# Patient Record
Sex: FEMALE | Race: WHITE | Employment: OTHER | URBAN - METROPOLITAN AREA
[De-identification: names, ages, dates, MRNs, and addresses within clinical notes are randomized per-mention and may not be internally consistent; named-entity substitution may affect disease eponyms.]

---

## 2017-02-28 ENCOUNTER — TELEPHONE (OUTPATIENT)
Dept: INTERNAL MEDICINE CLINIC | Age: 62
End: 2017-02-28

## 2017-02-28 DIAGNOSIS — G47.9 SLEEP DISORDER: ICD-10-CM

## 2017-02-28 DIAGNOSIS — F41.9 ANXIETY: ICD-10-CM

## 2017-02-28 RX ORDER — PRAMIPEXOLE DIHYDROCHLORIDE 0.5 MG/1
TABLET ORAL
Qty: 180 TAB | Refills: 0 | Status: SHIPPED | OUTPATIENT
Start: 2017-02-28 | End: 2017-08-19 | Stop reason: SDUPTHER

## 2017-03-02 NOTE — TELEPHONE ENCOUNTER
Last office visit - 05.13.16  Next office visit - n/a      Requested Prescriptions     Pending Prescriptions Disp Refills    clonazePAM (KLONOPIN) 0.5 mg tablet 30 Tab 3     Sig: Take 1 Tab by mouth nightly as needed.  Max Daily Amount: 0.5 mg.     Signed Prescriptions Disp Refills    pramipexole (MIRAPEX) 0.5 mg tablet 180 Tab 0     Sig: TAKE ONE TABLET BY MOUTH 2 TIMES A DAY     Authorizing Provider: BEBETO QUICK

## 2017-03-03 RX ORDER — CLONAZEPAM 0.5 MG/1
0.5 TABLET ORAL
Qty: 30 TAB | Refills: 0 | OUTPATIENT
Start: 2017-03-03 | End: 2017-04-07 | Stop reason: SDUPTHER

## 2017-03-03 NOTE — TELEPHONE ENCOUNTER
Orders Placed This Encounter    pramipexole (MIRAPEX) 0.5 mg tablet     Sig: TAKE ONE TABLET BY MOUTH 2 TIMES A DAY     Dispense:  180 Tab     Refill:  0    clonazePAM (KLONOPIN) 0.5 mg tablet     Sig: Take 1 Tab by mouth nightly as needed. Max Daily Amount: 0.5 mg.     Dispense:  30 Tab     Refill:  0      Due for follow up visit. Need follow up for further refills.   Will discuss taper at visit

## 2017-03-03 NOTE — TELEPHONE ENCOUNTER
Spoke to pt - she states that she has no problem scheduling a follow up but is out of town and will not be in front of her May schedule until Monday. She will contact the office at that time to schedule yearly f/u. She also notes that she does not want to wait until May to be advised of how to taper her Citalopram. Per pt \"I do not feel like I need it at this time. I think this is just a medication I need through the Winter (November- February) and I'd like to come off of it now if I do not need it. \"   Pt was advise that it would be best to stay on the medication until there is further information provided by Dr. Kathie Piña.      Rx called into pharmacy on file.

## 2017-03-03 NOTE — TELEPHONE ENCOUNTER
Patient called to check the status of her clonazepam script. She is completely out of medication.  Also she would like instructions on how to taper off of her citalopram.

## 2017-03-06 NOTE — TELEPHONE ENCOUNTER
Patient has scheduled her cpe for 06/16/2017.  She would like information about tapering off of the citalopram

## 2017-04-07 DIAGNOSIS — G47.9 SLEEP DISORDER: ICD-10-CM

## 2017-04-07 DIAGNOSIS — F41.9 ANXIETY: ICD-10-CM

## 2017-04-07 RX ORDER — CLONAZEPAM 0.5 MG/1
0.5 TABLET ORAL
Qty: 30 TAB | Refills: 0 | OUTPATIENT
Start: 2017-04-07 | End: 2017-05-03 | Stop reason: SDUPTHER

## 2017-04-07 NOTE — TELEPHONE ENCOUNTER
Rx called into pharmacy on file. Requested Prescriptions     Signed Prescriptions Disp Refills    clonazePAM (KLONOPIN) 0.5 mg tablet 30 Tab 0     Sig: Take 1 Tab by mouth nightly as needed. Max Daily Amount: 0.5 mg.      Authorizing Provider: Kami Hanna

## 2017-04-07 NOTE — TELEPHONE ENCOUNTER
Orders Placed This Encounter    clonazePAM (KLONOPIN) 0.5 mg tablet     Sig: Take 1 Tab by mouth nightly as needed.  Max Daily Amount: 0.5 mg.     Dispense:  30 Tab     Refill:  0       reviewed 4/7/2017

## 2017-04-07 NOTE — TELEPHONE ENCOUNTER
Requested Prescriptions     Pending Prescriptions Disp Refills    clonazePAM (KLONOPIN) 0.5 mg tablet 30 Tab 0     Sig: Take 1 Tab by mouth nightly as needed. Max Daily Amount: 0.5 mg.      LOV 5/13/16  NOV 6/16/17  Last refill 3/3/17

## 2017-05-03 DIAGNOSIS — F41.9 ANXIETY: ICD-10-CM

## 2017-05-03 DIAGNOSIS — G47.9 SLEEP DISORDER: ICD-10-CM

## 2017-05-03 RX ORDER — CLONAZEPAM 0.5 MG/1
0.5 TABLET ORAL
Qty: 30 TAB | Refills: 0 | OUTPATIENT
Start: 2017-05-03 | End: 2017-06-05 | Stop reason: SDUPTHER

## 2017-05-03 NOTE — TELEPHONE ENCOUNTER
Requested Prescriptions     Pending Prescriptions Disp Refills    clonazePAM (KLONOPIN) 0.5 mg tablet 30 Tab 0     Sig: Take 1 Tab by mouth nightly as needed. Max Daily Amount: 0.5 mg.      Last office 5/13/16  Next appointment 6/16/17  Last refill 4/7/17

## 2017-05-03 NOTE — TELEPHONE ENCOUNTER
Rx called into pharmacy on file. Requested Prescriptions     Signed Prescriptions Disp Refills    clonazePAM (KLONOPIN) 0.5 mg tablet 30 Tab 0     Sig: Take 1 Tab by mouth nightly as needed. Max Daily Amount: 0.5 mg.      Authorizing Provider: Karin Jimenez

## 2017-05-03 NOTE — TELEPHONE ENCOUNTER
Orders Placed This Encounter    clonazePAM (KLONOPIN) 0.5 mg tablet     Sig: Take 1 Tab by mouth nightly as needed.  Max Daily Amount: 0.5 mg.     Dispense:  30 Tab     Refill:  0       reviewed 5/3/2017

## 2017-05-15 ENCOUNTER — TELEPHONE (OUTPATIENT)
Dept: INTERNAL MEDICINE CLINIC | Age: 62
End: 2017-05-15

## 2017-05-15 NOTE — TELEPHONE ENCOUNTER
Please advise regarding the requested letter. Will advise pt to contact travel clinic for further information on travel immunizations.

## 2017-05-15 NOTE — LETTER
5/16/2017 2:41 PM 
 
Ms. Alfred Rodríguez [address] [facility] [id number] To Whom It May Concern, 
 
Ms. Rodríguez has a medical condition that limits her ability to sit for a prolonged period of time. It  will be necessary for her to get up and walk periodically during her flight to relieve her symptoms, to the extent that air safety allows. Please let me know if you have any questions. Sincerely, Yara Church MD

## 2017-05-15 NOTE — TELEPHONE ENCOUNTER
Patient would like  to write her a letter stating that she has restless leg syndrome and will need to occasionally move around during her flight.  Also she wants to know what vaccinations she will need to go to [place].Please call her back at [phone]

## 2017-05-16 NOTE — TELEPHONE ENCOUNTER
Letter for flight written, patient notified that it is ready to . Patient referred to [facility] on [address] for travel immunization information.

## 2017-06-05 DIAGNOSIS — G47.9 SLEEP DISORDER: ICD-10-CM

## 2017-06-05 DIAGNOSIS — F41.9 ANXIETY: ICD-10-CM

## 2017-06-05 NOTE — TELEPHONE ENCOUNTER
Received fax from [facility]  for Rx refill of Clonazepam 0.5mg tablets.   Rx pended to Dr. Napoleon Resendiz for approval.

## 2017-06-06 RX ORDER — CLONAZEPAM 0.5 MG/1
0.5 TABLET ORAL
Qty: 30 TAB | Refills: 0 | OUTPATIENT
Start: 2017-06-06 | End: 2017-06-23 | Stop reason: SDUPTHER

## 2017-06-06 NOTE — TELEPHONE ENCOUNTER
Orders Placed This Encounter    clonazePAM (KLONOPIN) 0.5 mg tablet     Sig: Take 1 Tab by mouth nightly as needed.  Max Daily Amount: 0.5 mg.     Dispense:  30 Tab     Refill:  0       reviewed 6/6/2017

## 2017-06-16 ENCOUNTER — OFFICE VISIT (OUTPATIENT)
Dept: INTERNAL MEDICINE CLINIC | Age: 62
End: 2017-06-16

## 2017-06-16 VITALS
WEIGHT: 105 LBS | TEMPERATURE: 98.3 F | HEART RATE: 67 BPM | OXYGEN SATURATION: 97 % | BODY MASS INDEX: 17.93 KG/M2 | DIASTOLIC BLOOD PRESSURE: 52 MMHG | RESPIRATION RATE: 14 BRPM | SYSTOLIC BLOOD PRESSURE: 96 MMHG | HEIGHT: 64 IN

## 2017-06-16 DIAGNOSIS — Z79.899 ENCOUNTER FOR LONG-TERM (CURRENT) USE OF MEDICATIONS: ICD-10-CM

## 2017-06-16 DIAGNOSIS — Z00.00 ANNUAL PHYSICAL EXAM: Primary | ICD-10-CM

## 2017-06-16 DIAGNOSIS — G25.81 RESTLESS LEG: ICD-10-CM

## 2017-06-16 DIAGNOSIS — F33.8 SEASONAL AFFECTIVE DISORDER (HCC): ICD-10-CM

## 2017-06-16 DIAGNOSIS — G47.9 SLEEP DISTURBANCE: ICD-10-CM

## 2017-06-16 RX ORDER — ESTRADIOL 0.03 MG/D
FILM, EXTENDED RELEASE TRANSDERMAL
COMMUNITY
Start: 2017-05-12

## 2017-06-16 RX ORDER — PROGESTERONE 100 MG/1
100 CAPSULE ORAL DAILY
COMMUNITY
Start: 2017-05-11

## 2017-06-16 NOTE — PROGRESS NOTES
1. Have you been to the ER, urgent care clinic since your last visit? Hospitalized since your last visit? No    2. Have you seen or consulted any other health care providers outside of the [address] [place] [address] since your last visit? Include any pap smears or colon screening. No     Chief Complaint   Patient presents with    Complete Physical    Medication Evaluation     would like to discuss Requip and medication to stay asleep longer.  Skin Problem     spot on left side of nose would like recommendation to see derm. Not fasting.

## 2017-06-16 NOTE — PROGRESS NOTES
Subjective:      Armaan Villalba is a 60 y.o. female who presents today for her annual exam.     She is having difficulty staying asleep. No trouble falling asleep. Will wake around 3 am and not be able to fall back asleep. She gets up aroung 5-6 AM for work. Saw a medical . Little spot on side of nose. - need to see derm    History of Right knee cartilage surgery in her 20's. She is now having some pain in her right knee. Intermittent. Ache. Has not had to use any medications. Patient Active Problem List   Diagnosis Code    Restless legs syndrome G25.81    Sleep disorder G47.9    Osteopenia M85.80    Celiac disease K90.0    Iron deficiency E61.1    Pap smear for cervical cancer screening Z12.4    History of screening mammography Z92.89    History of bone density study Z92.89    Colon cancer screening Z12.11    Herpetic lesion B00.9    Lumbar disc disease with radiculopathy M51.16    Depression with anxiety F41.8    Advance directive discussed with patient Z71.89     Current Outpatient Prescriptions   Medication Sig Dispense Refill    estradiol 0.025 mg/24 hr ptsw       progesterone (PROMETRIUM) 100 mg capsule       clonazePAM (KLONOPIN) 0.5 mg tablet Take 1 Tab by mouth nightly as needed. Max Daily Amount: 0.5 mg. 30 Tab 0    pramipexole (MIRAPEX) 0.5 mg tablet TAKE ONE TABLET BY MOUTH 2 TIMES A  Tab 0    VAGIFEM 10 mcg tab vaginal tablet       valACYclovir (VALTREX) 500 mg tablet Take 1 Tab by mouth two (2) times a day. For 1-3 days as needed. 30 Tab 3        Review of Systems    Pertinent items are noted in HPI. Objective:     Visit Vitals    BP 96/52 (BP 1 Location: Left arm, BP Patient Position: Sitting)    Pulse 67    Temp 98.3 °F (36.8 °C) (Oral)    Resp 14    Ht 5' 3.5\" (1.613 m)    Wt 105 lb (47.6 kg)    SpO2 97%    BMI 18.31 kg/m2     General:  Alert, cooperative, no distress, appears stated age.    Head:  Normocephalic, without obvious abnormality, atraumatic. Eyes:  Conjunctivae/corneas clear. PERRL, EOMs intact. Ears:  Normal TMs and external ear canals both ears. Nose: Nares normal. Septum midline. Mucosa normal. No drainage or sinus tenderness. Throat: Lips, mucosa, and tongue normal. Teeth and gums normal.   Neck: Supple, symmetrical, trachea midline, no adenopathy, thyroid: no enlargement/tenderness/nodules, no carotid bruit and no JVD. Back:   Symmetric, no curvature. ROM normal. No CVA tenderness. Lungs:   Clear to auscultation bilaterally. Chest wall:  No tenderness or deformity. Heart:  Regular rate and rhythm, S1, S2 normal, no murmur, click, rub or gallop. Breast Exam:  No tenderness, masses, or nipple abnormality. Abdomen:   Soft, non-tender. Bowel sounds normal. No masses,  No organomegaly. Extremities: Extremities normal, atraumatic, no cyanosis or edema. Incisional scar on medial right knee   Pulses: 2+ and symmetric all extremities. Skin: Skin color, texture, turgor normal. No rashes or lesions. Lymph nodes: Cervical, supraclavicular, and axillary nodes normal.   Neurologic: CNII-XII intact. Normal strength, sensation and reflexes throughout. Assessment/Plan:     1. Annual physical exam  -up to date with gyn exam  -up to date with screening mammogram and colonoscopy   -up to date with DXA. - CBC WITH AUTOMATED DIFF  - METABOLIC PANEL, COMPREHENSIVE  - LIPID PANEL  - VITAMIN D, 25 HYDROXY    Also, she has additional complaints of the following ---.     2. Restless leg  -I evaluated and recommended to continue current doses of medications. - CBC WITH AUTOMATED DIFF  - METABOLIC PANEL, COMPREHENSIVE  - TSH 3RD GENERATION  - IRON PROFILE    3. Sleep disturbance  -will increase her clonazepam from 0.5mg nightly to 1 1/2 of this tablet at bedtime.   If this adjustment is not helpful, will go back to one tablet nightly and switch her mirapex to requip which she has used in the past and may have helped with sleep as well    - TSH 3RD GENERATION    4. Encounter for long-term (current) use of medications      5. Seasonal affective disorder ([facility] [address]  -found use of celexa helpful in the winter.  -will follow up in September to determine if restarting celexa at that time would be beneficial.    Orders Placed This Encounter    CBC WITH AUTOMATED DIFF    METABOLIC PANEL, COMPREHENSIVE    LIPID PANEL    VITAMIN D, 25 HYDROXY    TSH 3RD GENERATION    IRON PROFILE     Follow-up Disposition:     Follow up in 3 months    Return if symptoms worsen or fail to improve. Advised patient to call back or return to office if symptoms worsen/change/persist.     Discussed expected course/resolution/complications of diagnosis in detail with patient. Medication risks/benefits/costs/interactions/alternatives discussed with patient. Patient was given an after visit summary which includes diagnoses, current medications, & vitals. Patient expressed understanding with the diagnosis and plan.

## 2017-06-16 NOTE — MR AVS SNAPSHOT
Visit Information Date & Time Provider Department Dept. Phone Encounter #  
 6/16/2017 10:00 AM Killian Cuevas MD [place] [address] [facility] [phone] [id number] Follow-up Instructions Return in about 3 days (around 6/19/2017). Upcoming Health Maintenance Date Due INFLUENZA AGE 9 TO ADULT 8/1/2017 PAP AKA CERVICAL CYTOLOGY 7/1/2018 BREAST CANCER SCRN MAMMOGRAM 7/7/2018 DTaP/Tdap/Td series (2 - Td) 12/17/2018 COLONOSCOPY 2/8/2023 Allergies as of 6/16/2017  Review Complete On: 6/16/2017 By: Killian Cuevas MD  
  
 Severity Noted Reaction Type Reactions Ambien Cr [Zolpidem] Medium 10/08/2012   Side Effect Other (comments) Agitation & mood lability; higher dosages only Requip [Ropinirole]  11/29/2012   Side Effect Nausea Only Current Immunizations  Reviewed on 6/15/2017 Name Date Hep B Vaccine 4/28/1998, 3/24/1998, 3/24/1998 Tdap 12/17/2008 Reviewed by Killian Cuevas MD on 6/15/2017 at  3:27 PM  
You Were Diagnosed With   
  
 Codes Comments Annual physical exam    -  Primary ICD-10-CM: Z00.00 ICD-9-CM: V70.0 Restless leg     ICD-10-CM: G25.81 ICD-9-CM: 333.94 Sleep disturbance     ICD-10-CM: G47.9 ICD-9-CM: 780.50 Encounter for long-term (current) use of medications     ICD-10-CM: Z79.899 ICD-9-CM: V58.69 [facility]     ICD-10-CM: F39 
ICD-9-CM: 296.99 Vitals BP Pulse Temp Resp Height(growth percentile) Weight(growth percentile) 96/52 (BP 1 Location: Left arm, BP Patient Position: Sitting) 67 98.3 °F (36.8 °C) (Oral) 14 5' 3.5\" (1.613 m) 105 lb (47.6 kg) SpO2 BMI OB Status Smoking Status 97% 18.31 kg/m2 Postmenopausal Never Smoker Vitals History BMI and BSA Data Body Mass Index Body Surface Area  
 18.31 kg/m 2 1.46 m 2 Preferred Pharmacy Pharmacy Name Phone [facility] [phone] Your Updated Medication List  
  
   
This list is accurate as of: 6/16/17 11:24 AM.  Always use your most recent med list.  
  
  
  
  
 clonazePAM 0.5 mg tablet Commonly known as:  Wendelyn Mech Take 1 Tab by mouth nightly as needed. Max Daily Amount: 0.5 mg.  
  
 pramipexole 0.5 mg tablet Commonly known as:  MIRAPEX TAKE ONE TABLET BY MOUTH 2 TIMES A DAY  
  
 progesterone 100 mg capsule Commonly known as:  PROMETRIUM * VAGIFEM 10 mcg Tab vaginal tablet Generic drug:  estradiol * estradiol 0.025 mg/24 hr Ptsw  
  
 valACYclovir 500 mg tablet Commonly known as:  VALTREX Take 1 Tab by mouth two (2) times a day. For 1-3 days as needed. * Notice: This list has 2 medication(s) that are the same as other medications prescribed for you. Read the directions carefully, and ask your doctor or other care provider to review them with you. We Performed the Following CBC WITH AUTOMATED DIFF [14714 CPT(R)] IRON PROFILE C3091961 CPT(R)] LIPID PANEL [34570 CPT(R)] METABOLIC PANEL, COMPREHENSIVE [88183 CPT(R)] TSH 3RD GENERATION [03450 CPT(R)] VITAMIN D, 25 HYDROXY [id number] CPT(R)] Follow-up Instructions Return in about 3 days (around 6/19/2017). Introducing [facility] & HEALTH SERVICES! [facility] introduces [employer] patient portal. Now you can access parts of your medical record, email your doctor's office, and request medication refills online. 1. In your internet browser, go to [email or web address] 2. Click on the First Time User? Click Here link in the Sign In box. You will see the New Member Sign Up page. 3. Enter your [employer] Access Code exactly as it appears below. You will not need to use this code after youve completed the sign-up process. If you do not sign up before the expiration date, you must request a new code. · [employer] Access Code: [email or web address] Expires: 9/14/2017 11:22 AM 
 
 4. Enter the last four digits of your Social Security Number (xxxx) and Date of Birth (mm/dd/yyyy) as indicated and click Submit. You will be taken to the next sign-up page. 5. Create a SocialKaty ID. This will be your [employer] login ID and cannot be changed, so think of one that is secure and easy to remember. 6. Create a [employer] password. You can change your password at any time. 7. Enter your Password Reset Question and Answer. This can be used at a later time if you forget your password. 8. Enter your e-mail address. You will receive e-mail notification when new information is available in [address] 9. Click Sign Up. You can now view and download portions of your medical record. 10. Click the Download Summary menu link to download a portable copy of your medical information. If you have questions, please visit the Frequently Asked Questions section of the [employer] website. Remember, [employer] is NOT to be used for urgent needs. For medical emergencies, dial 911. Now available from your iPhone and Android! Please provide this summary of care documentation to your next provider. Your primary care clinician is listed as Ara Turner. If you have any questions after today's visit, please call [phone].

## 2017-06-23 ENCOUNTER — TELEPHONE (OUTPATIENT)
Dept: INTERNAL MEDICINE CLINIC | Age: 62
End: 2017-06-23

## 2017-06-23 DIAGNOSIS — F41.9 ANXIETY: ICD-10-CM

## 2017-06-23 DIAGNOSIS — G47.9 SLEEP DISORDER: ICD-10-CM

## 2017-06-23 RX ORDER — CLONAZEPAM 0.5 MG/1
0.75 TABLET ORAL
Qty: 45 TAB | Refills: 3 | OUTPATIENT
Start: 2017-06-23 | End: 2017-11-15 | Stop reason: SDUPTHER

## 2017-06-23 NOTE — TELEPHONE ENCOUNTER
Last office visit - 06.16.17  Next office visit - 09.29.17  Last Refill - 06.06.17    Rx quantity changed to satisfy the added 1/2 tablet & pended for review. Requested Prescriptions     Pending Prescriptions Disp Refills    clonazePAM (KLONOPIN) 0.5 mg tablet 45 Tab 0     Sig: Take 1 Tab by mouth nightly as needed.  Max Daily Amount: 0.5 mg.

## 2017-06-23 NOTE — TELEPHONE ENCOUNTER
Rx called into pharmacy on file. Requested Prescriptions     Signed Prescriptions Disp Refills    clonazePAM (KLONOPIN) 0.5 mg tablet 45 Tab 3     Sig: Take 1.5 Tabs by mouth nightly as needed. Max Daily Amount: 0.75 mg.      Authorizing Provider: River Pineda

## 2017-06-23 NOTE — TELEPHONE ENCOUNTER
Orders Placed This Encounter    clonazePAM (KLONOPIN) 0.5 mg tablet     Sig: Take 1.5 Tabs by mouth nightly as needed. Max Daily Amount: 0.75 mg.      Dispense:  45 Tab     Refill:  3       reviewed 6/23/2017

## 2017-06-23 NOTE — TELEPHONE ENCOUNTER
David Rodríguez 1955     Pt calld stating that the extra half pill of Rx clonazePAM (KLONOPIN) 0.5 mg is working and states that she will run out on the 30th. Pt would like a new script with new dosage sent to [facility].

## 2017-07-04 LAB
25(OH)D3+25(OH)D2 SERPL-MCNC: 29.1 NG/ML (ref 30–100)
ALBUMIN SERPL-MCNC: 4.4 G/DL (ref 3.6–4.8)
ALBUMIN/GLOB SERPL: 1.8 {RATIO} (ref 1.2–2.2)
ALP SERPL-CCNC: 86 IU/L (ref 39–117)
ALT SERPL-CCNC: 18 IU/L (ref 0–32)
AST SERPL-CCNC: 26 IU/L (ref 0–40)
BASOPHILS # BLD AUTO: 0 X10E3/UL (ref 0–0.2)
BASOPHILS NFR BLD AUTO: 1 %
BILIRUB SERPL-MCNC: 0.3 MG/DL (ref 0–1.2)
BUN SERPL-MCNC: 17 MG/DL (ref 8–27)
BUN/CREAT SERPL: 24 (ref 12–28)
CALCIUM SERPL-MCNC: 9.4 MG/DL (ref 8.7–10.3)
CHLORIDE SERPL-SCNC: 101 MMOL/L (ref 96–106)
CHOLEST SERPL-MCNC: 197 MG/DL (ref 100–199)
CO2 SERPL-SCNC: 24 MMOL/L (ref 18–29)
CREAT SERPL-MCNC: 0.71 MG/DL (ref 0.57–1)
EOSINOPHIL # BLD AUTO: 0.7 X10E3/UL (ref 0–0.4)
EOSINOPHIL NFR BLD AUTO: 10 %
ERYTHROCYTE [DISTWIDTH] IN BLOOD BY AUTOMATED COUNT: 13.8 % (ref 12.3–15.4)
GLOBULIN SER CALC-MCNC: 2.5 G/DL (ref 1.5–4.5)
GLUCOSE SERPL-MCNC: 91 MG/DL (ref 65–99)
HCT VFR BLD AUTO: 36.2 % (ref 34–46.6)
HDLC SERPL-MCNC: 98 MG/DL
HGB BLD-MCNC: 11.7 G/DL (ref 11.1–15.9)
IMM GRANULOCYTES # BLD: 0 X10E3/UL (ref 0–0.1)
IMM GRANULOCYTES NFR BLD: 0 %
INTERPRETATION, 910389: NORMAL
IRON SATN MFR SERPL: 30 % (ref 15–55)
IRON SERPL-MCNC: 102 UG/DL (ref 27–139)
LDLC SERPL CALC-MCNC: 91 MG/DL (ref 0–99)
LYMPHOCYTES # BLD AUTO: 1.9 X10E3/UL (ref 0.7–3.1)
LYMPHOCYTES NFR BLD AUTO: 30 %
MCH RBC QN AUTO: 30.9 PG (ref 26.6–33)
MCHC RBC AUTO-ENTMCNC: 32.3 G/DL (ref 31.5–35.7)
MCV RBC AUTO: 96 FL (ref 79–97)
MONOCYTES # BLD AUTO: 0.6 X10E3/UL (ref 0.1–0.9)
MONOCYTES NFR BLD AUTO: 10 %
NEUTROPHILS # BLD AUTO: 3.1 X10E3/UL (ref 1.4–7)
NEUTROPHILS NFR BLD AUTO: 49 %
PLATELET # BLD AUTO: 269 X10E3/UL (ref 150–379)
POTASSIUM SERPL-SCNC: 4.4 MMOL/L (ref 3.5–5.2)
PROT SERPL-MCNC: 6.9 G/DL (ref 6–8.5)
RBC # BLD AUTO: 3.79 X10E6/UL (ref 3.77–5.28)
SODIUM SERPL-SCNC: 142 MMOL/L (ref 134–144)
TIBC SERPL-MCNC: 343 UG/DL (ref 250–450)
TRIGL SERPL-MCNC: 38 MG/DL (ref 0–149)
TSH SERPL DL<=0.005 MIU/L-ACNC: 3.59 UIU/ML (ref 0.45–4.5)
UIBC SERPL-MCNC: 241 UG/DL (ref 118–369)
VLDLC SERPL CALC-MCNC: 8 MG/DL (ref 5–40)
WBC # BLD AUTO: 6.3 X10E3/UL (ref 3.4–10.8)

## 2017-08-20 RX ORDER — PRAMIPEXOLE DIHYDROCHLORIDE 0.5 MG/1
TABLET ORAL
Qty: 180 TAB | Refills: 0 | Status: SHIPPED | OUTPATIENT
Start: 2017-08-20 | End: 2017-09-14 | Stop reason: ALTCHOICE

## 2017-08-28 ENCOUNTER — TELEPHONE (OUTPATIENT)
Dept: INTERNAL MEDICINE CLINIC | Age: 62
End: 2017-08-28

## 2017-08-28 NOTE — TELEPHONE ENCOUNTER
Patient is out of town, and forgot her progesterone, she would like to know if #7 pills can be called to [address] [place] [address] [phone].

## 2017-09-14 ENCOUNTER — OFFICE VISIT (OUTPATIENT)
Dept: INTERNAL MEDICINE CLINIC | Age: 62
End: 2017-09-14

## 2017-09-14 VITALS
WEIGHT: 105.6 LBS | HEART RATE: 74 BPM | BODY MASS INDEX: 18.03 KG/M2 | TEMPERATURE: 97.9 F | HEIGHT: 64 IN | OXYGEN SATURATION: 100 % | DIASTOLIC BLOOD PRESSURE: 74 MMHG | SYSTOLIC BLOOD PRESSURE: 98 MMHG | RESPIRATION RATE: 15 BRPM

## 2017-09-14 DIAGNOSIS — F41.9 ANXIETY: Primary | ICD-10-CM

## 2017-09-14 DIAGNOSIS — Z79.899 ENCOUNTER FOR LONG-TERM (CURRENT) USE OF MEDICATIONS: ICD-10-CM

## 2017-09-14 DIAGNOSIS — G25.81 RESTLESS LEGS: ICD-10-CM

## 2017-09-14 RX ORDER — CITALOPRAM 10 MG/1
15 TABLET ORAL DAILY
Qty: 45 TAB | Refills: 5 | Status: SHIPPED | OUTPATIENT
Start: 2017-09-14 | End: 2017-10-19 | Stop reason: SDUPTHER

## 2017-09-14 RX ORDER — ROPINIROLE 4 MG/1
4 TABLET, FILM COATED ORAL
Qty: 30 TAB | Refills: 0 | Status: SHIPPED | OUTPATIENT
Start: 2017-09-14 | End: 2018-03-09

## 2017-09-14 NOTE — MR AVS SNAPSHOT
Visit Information Date & Time Provider Department Dept. Phone Encounter #  
 9/14/2017 11:20 AM MD [facility] [phone] [id number] Follow-up Instructions Return in about 6 months (around 3/14/2018) for restless legs and anxiety. Your Appointments 9/29/2017 11:20 AM  
ROUTINE CARE with MD [facility] ([address]) Appt Note: 3m fu  
 [address] [place] [address] [place]  
[address], [place] [address] [address] Upcoming Health Maintenance Date Due INFLUENZA AGE 9 TO ADULT 8/1/2017 PAP AKA CERVICAL CYTOLOGY 7/1/2018 BREAST CANCER SCRN MAMMOGRAM 7/7/2018 DTaP/Tdap/Td series (2 - Td) 12/17/2018 COLONOSCOPY 2/8/2023 Allergies as of 9/14/2017  Review Complete On: 9/14/2017 By: Dasha Chávez MD  
  
 Severity Noted Reaction Type Reactions Ambien Cr [Zolpidem] Medium 10/08/2012   Side Effect Other (comments) Agitation & mood lability; higher dosages only Requip [Ropinirole]  11/29/2012   Side Effect Nausea Only Current Immunizations  Reviewed on 6/15/2017 Name Date Hep B Vaccine 4/28/1998, 3/24/1998, 3/24/1998 Tdap 12/17/2008 Not reviewed this visit You Were Diagnosed With   
  
 Codes Comments Anxiety    -  Primary ICD-10-CM: F41.9 ICD-9-CM: 300.00 Restless legs     ICD-10-CM: G25.81 ICD-9-CM: 333.94 Encounter for long-term (current) use of medications     ICD-10-CM: Z79.899 ICD-9-CM: V58.69 Vitals BP Pulse Temp Resp Height(growth percentile) Weight(growth percentile) 98/74 (BP 1 Location: Left arm, BP Patient Position: Sitting) 74 97.9 °F (36.6 °C) (Oral) 15 5' 3.5\" (1.613 m) 105 lb 9.6 oz (47.9 kg) SpO2 BMI OB Status Smoking Status 100% 18.41 kg/m2 Postmenopausal Never Smoker Vitals History BMI and BSA Data Body Mass Index Body Surface Area 18.41 kg/m 2 1.46 m 2 Preferred Pharmacy Pharmacy Name Phone [facility] [phone] Your Updated Medication List  
  
   
This list is accurate as of: 9/14/17 12:14 PM.  Always use your most recent med list.  
  
  
  
  
 citalopram 10 mg tablet Commonly known as:  Rosalinda Penning Take 1.5 Tabs by mouth daily. clonazePAM 0.5 mg tablet Commonly known as:  Michelle Fearing Take 1.5 Tabs by mouth nightly as needed. Max Daily Amount: 0.75 mg.  
  
 progesterone 100 mg capsule Commonly known as:  PROMETRIUM  
  
 rOPINIRole 4 mg Tab TAB Commonly known as:  Rupal Jessica Take 1 Tab by mouth nightly. * VAGIFEM 10 mcg Tab vaginal tablet Generic drug:  estradiol * estradiol 0.025 mg/24 hr Ptsw  
  
 valACYclovir 500 mg tablet Commonly known as:  VALTREX Take 1 Tab by mouth two (2) times a day. For 1-3 days as needed. * Notice: This list has 2 medication(s) that are the same as other medications prescribed for you. Read the directions carefully, and ask your doctor or other care provider to review them with you. Prescriptions Sent to Pharmacy Refills  
 citalopram (CELEXA) 10 mg tablet 5 Sig: Take 1.5 Tabs by mouth daily. Class: Normal  
 [facility]acy: [facility] Ph #: [phone] Route: Oral  
 rOPINIRole (REQUIP) 4 mg tab TAB 0 Sig: Take 1 Tab by mouth nightly. Class: Normal  
 Pharmacy: [facility] [place] [address] Ph #: [phone] Route: Oral  
  
Follow-up Instructions Return in about 6 months (around 3/14/2018) for restless legs and anxiety. Introducing [facility] & HEALTH SERVICES! Julio Barba introduces [employer] patient portal. Now you can access parts of your medical record, email your doctor's office, and request medication refills online.    
 
1. In your internet browser, go to [email or web address] 2. Click on the First Time User? Click Here link in the Sign In box. You will see the New Member Sign Up page. 3. Enter your [employer] Access Code exactly as it appears below. You will not need to use this code after youve completed the sign-up process. If you do not sign up before the expiration date, you must request a new code. · [employer] Access Code: [email or web address] Expires: 12/13/2017 12:03 PM 
 
4. Enter the last four digits of your Social Security Number (xxxx) and Date of Birth (mm/dd/yyyy) as indicated and click Submit. You will be taken to the next sign-up page. 5. Create a [employer] ID. This will be your [employer] login ID and cannot be changed, so think of one that is secure and easy to remember. 6. Create a [employer] password. You can change your password at any time. 7. Enter your Password Reset Question and Answer. This can be used at a later time if you forget your password. 8. Enter your e-mail address. You will receive e-mail notification when new information is available in [address] 9. Click Sign Up. You can now view and download portions of your medical record. 10. Click the Download Summary menu link to download a portable copy of your medical information. If you have questions, please visit the Frequently Asked Questions section of the [employer] website. Remember, [employer] is NOT to be used for urgent needs. For medical emergencies, dial 911. Now available from your iPhone and Android! Please provide this summary of care documentation to your next provider. Your primary care clinician is listed as Ara Turner. If you have any questions after today's visit, please call [phone].

## 2017-09-14 NOTE — PROGRESS NOTES
Subjective:      Hayden Larios is a 60 y.o. female who presents today for follow up of her anxiety and her restless legs. She has been off of her celexa since spring time, but has some exacerbation of her seasonal affective component around this time of year. She would like to restart her celexa 15mg daily. She is also still having trouble with waking in the middle of the night and not begin able to fall back asleep. No trouble falling asleep. She would like to try requip instead of them mirapex that she is using to see if it would help keep her asleep a little longer. Patient Active Problem List   Diagnosis Code    Restless legs syndrome G25.81    Sleep disorder G47.9    Osteopenia M85.80    Celiac disease K90.0    Iron deficiency E61.1    Pap smear for cervical cancer screening Z12.4    History of screening mammography Z92.89    History of bone density study Z92.89    Colon cancer screening Z12.11    Herpetic lesion B00.9    Lumbar disc disease with radiculopathy M51.16    Depression with anxiety F41.8    Advance directive discussed with patient Z71.89     Current Outpatient Prescriptions   Medication Sig Dispense Refill    citalopram (CELEXA) 10 mg tablet Take 1.5 Tabs by mouth daily. 45 Tab 5    rOPINIRole (REQUIP) 4 mg tab TAB Take 1 Tab by mouth nightly. 30 Tab 0    clonazePAM (KLONOPIN) 0.5 mg tablet Take 1.5 Tabs by mouth nightly as needed. Max Daily Amount: 0.75 mg. 45 Tab 3    estradiol 0.025 mg/24 hr ptsw       progesterone (PROMETRIUM) 100 mg capsule       VAGIFEM 10 mcg tab vaginal tablet       valACYclovir (VALTREX) 500 mg tablet Take 1 Tab by mouth two (2) times a day. For 1-3 days as needed. 30 Tab 3        Review of Systems    Pertinent items are noted in HPI.      Objective:     Visit Vitals    BP 98/74 (BP 1 Location: Left arm, BP Patient Position: Sitting)    Pulse 74    Temp 97.9 °F (36.6 °C) (Oral)    Resp 15    Ht 5' 3.5\" (1.613 m)    Wt 105 lb 9.6 oz (47.9 kg)    SpO2 100%    BMI 18.41 kg/m2     General appearance: alert, cooperative, no distress, appears stated age  Head: Normocephalic, without obvious abnormality, atraumatic  Neck: supple, symmetrical, trachea midline, no adenopathy, no carotid bruit and no JVD  Lungs: clear to auscultation bilaterally  Heart: regular rate and rhythm, S1, S2 normal, no murmur, click, rub or gallop  Extremities: extremities normal, atraumatic, no cyanosis or edema  Pulses: 2+ and symmetric    Assessment/Plan:     1. Anxiety  -will restart her celexa 15mg daily to help her with symptoms of depression as the days get shorter    2. Restless legs  -mirapex not allowing her to sleep through the night. Wakes usually around 3am.  -trial of requip 4mg nightly    3. Encounter for long-term (current) use of medications    Orders Placed This Encounter    citalopram (CELEXA) 10 mg tablet     Sig: Take 1.5 Tabs by mouth daily. Dispense:  45 Tab     Refill:  5    rOPINIRole (REQUIP) 4 mg tab TAB     Sig: Take 1 Tab by mouth nightly. Dispense:  30 Tab     Refill:  0         Follow-up Disposition:     Follow up in 6 months     Return if symptoms worsen or fail to improve. Advised patient to call back or return to office if symptoms worsen/change/persist.     Discussed expected course/resolution/complications of diagnosis in detail with patient. Medication risks/benefits/costs/interactions/alternatives discussed with patient. Patient was given an after visit summary which includes diagnoses, current medications, & vitals. Patient expressed understanding with the diagnosis and plan.

## 2017-09-14 NOTE — PROGRESS NOTES
Chief Complaint   Patient presents with    Anxiety     Medication follow up    Skin Problem     Pt c/o spot on nose, it was m=noticed about 4-5 weeks ago and has not disapperared. 1. Have you been to the ER, urgent care clinic since your last visit? Hospitalized since your last visit? No    2. Have you seen or consulted any other health care providers outside of the [address] [place] [address] since your last visit? Include any pap smears or colon screening.  No

## 2017-09-21 ENCOUNTER — TELEPHONE (OUTPATIENT)
Dept: INTERNAL MEDICINE CLINIC | Age: 62
End: 2017-09-21

## 2017-09-21 NOTE — TELEPHONE ENCOUNTER
Ellen Rodríguez 1955     Pt states that she tried the Requip and it makes her very nauseas. Pt states that's she has retired Requip in the past with the same effect. Pt wanted to inform Dr. Shani Olivares there is no need for a refill.  Pt also stats that she would like a referral to a Podiatrist.

## 2017-09-22 NOTE — TELEPHONE ENCOUNTER
Call to pt - she was provided with the information listed below:    Petey Almeida    Physician           Specialty     Podiatry       Primary Contact Information      Phone Fax E-mail Address     [phone] Not available.  [address] [place] [address] and [address]

## 2017-10-19 ENCOUNTER — TELEPHONE (OUTPATIENT)
Dept: INTERNAL MEDICINE CLINIC | Age: 62
End: 2017-10-19

## 2017-10-19 RX ORDER — CITALOPRAM 20 MG/1
20 TABLET, FILM COATED ORAL DAILY
Qty: 30 TAB | Refills: 5 | Status: SHIPPED | OUTPATIENT
Start: 2017-10-19 | End: 2018-06-18

## 2017-10-19 NOTE — TELEPHONE ENCOUNTER
Patient is currently taking citalopram 15mg, and it does not seem to be working, she would like to know if the dose can be increased to 20mg, please call.

## 2017-10-19 NOTE — TELEPHONE ENCOUNTER
Rx pended for review. Requested Prescriptions     Pending Prescriptions Disp Refills    citalopram (CELEXA) 20 mg tablet 30 Tab      Sig: Take 1 Tab by mouth daily.

## 2017-10-19 NOTE — TELEPHONE ENCOUNTER
Approved for dose to be increased to celexa 20mg daily from 15mg daily. New script sent to her pharmacy.     Has follow up in 3/2018

## 2017-10-19 NOTE — TELEPHONE ENCOUNTER
Pt advised of Dr. Jose Alejandro Burch message and verbalized understanding. No further questions at this time.

## 2017-11-14 DIAGNOSIS — F41.9 ANXIETY: ICD-10-CM

## 2017-11-14 DIAGNOSIS — G47.9 SLEEP DISORDER: ICD-10-CM

## 2017-11-15 DIAGNOSIS — F41.9 ANXIETY: ICD-10-CM

## 2017-11-15 DIAGNOSIS — G47.9 SLEEP DISORDER: ICD-10-CM

## 2017-11-15 RX ORDER — CLONAZEPAM 0.5 MG/1
0.75 TABLET ORAL
Qty: 45 TAB | Refills: 3 | OUTPATIENT
Start: 2017-11-15

## 2017-11-15 RX ORDER — CLONAZEPAM 0.5 MG/1
TABLET ORAL
Qty: 45 TAB | Refills: 3 | OUTPATIENT
Start: 2017-11-15 | End: 2018-04-28 | Stop reason: SDUPTHER

## 2017-11-15 NOTE — TELEPHONE ENCOUNTER
Orders Placed This Encounter    clonazePAM (KLONOPIN) 0.5 mg tablet     Sig: TAKE 1 AND 1/2 TABLETS BY MOUTH AT BEDTIME AS NEEDED *MAX DAILY AMOUNT 1 AND 1/2*     Dispense:  45 Tab     Refill:  3       reviewed 11/15/2017

## 2017-11-15 NOTE — TELEPHONE ENCOUNTER
Rx called into pharmacist at [facility].      Requested Prescriptions     Signed Prescriptions Disp Refills    clonazePAM (KLONOPIN) 0.5 mg tablet 45 Tab 3     Sig: TAKE 1 AND 1/2 TABLETS BY MOUTH AT BEDTIME AS NEEDED *MAX DAILY AMOUNT 1 AND 1/2*     Authorizing Provider: Wendie Diaz

## 2017-12-07 ENCOUNTER — OFFICE VISIT (OUTPATIENT)
Dept: INTERNAL MEDICINE CLINIC | Age: 62
End: 2017-12-07

## 2017-12-07 VITALS
BODY MASS INDEX: 17.75 KG/M2 | TEMPERATURE: 97.1 F | HEIGHT: 64 IN | RESPIRATION RATE: 14 BRPM | OXYGEN SATURATION: 98 % | HEART RATE: 79 BPM | SYSTOLIC BLOOD PRESSURE: 106 MMHG | WEIGHT: 104 LBS | DIASTOLIC BLOOD PRESSURE: 68 MMHG

## 2017-12-07 DIAGNOSIS — R22.41 KNEE MASS, RIGHT: Primary | ICD-10-CM

## 2017-12-07 RX ORDER — CITALOPRAM 10 MG/1
TABLET ORAL
COMMUNITY
Start: 2017-09-14 | End: 2017-12-07 | Stop reason: DRUGHIGH

## 2017-12-07 NOTE — PROGRESS NOTES
Subjective:      Jennifer Olvera is a 60 y.o. female who presents today with a mass she noted in her right leg this morning. She has been working with Dr. Carolina Louis and got a second opinion from Dr. Riya Arnold regarding the arthritis in her right knee. Has also a baker's cyst in her right knee. She noticed this morning there is a mass that is tender to touch on her leg. No trauma. No redness. Has gotten a little smaller since she noted it this morning. Patient Active Problem List   Diagnosis Code    Restless legs syndrome G25.81    Sleep disorder G47.9    Osteopenia M85.80    Celiac disease K90.0    Iron deficiency E61.1    Pap smear for cervical cancer screening Z12.4    History of screening mammography Z92.89    History of bone density study Z92.89    Colon cancer screening Z12.11    Herpetic lesion B00.9    Lumbar disc disease with radiculopathy M51.16    Depression with anxiety F41.8    Advance directive discussed with patient Z71.89     Current Outpatient Prescriptions   Medication Sig Dispense Refill    BOSWELLIA EDI EXTRACT (BOSWELLIA EDI XT, BULK,) by Does Not Apply route.  clonazePAM (KLONOPIN) 0.5 mg tablet TAKE 1 AND 1/2 TABLETS BY MOUTH AT BEDTIME AS NEEDED *MAX DAILY AMOUNT 1 AND 1/2* 45 Tab 3    citalopram (CELEXA) 20 mg tablet Take 1 Tab by mouth daily. 30 Tab 5    rOPINIRole (REQUIP) 4 mg tab TAB Take 1 Tab by mouth nightly. 30 Tab 0    estradiol 0.025 mg/24 hr ptsw       progesterone (PROMETRIUM) 100 mg capsule       VAGIFEM 10 mcg tab vaginal tablet       valACYclovir (VALTREX) 500 mg tablet Take 1 Tab by mouth two (2) times a day. For 1-3 days as needed. 30 Tab 3        Review of Systems    Pertinent items are noted in HPI.      Objective:     Visit Vitals    /68 (BP 1 Location: Left arm, BP Patient Position: Sitting)    Pulse 79    Temp 97.1 °F (36.2 °C) (Oral)    Resp 14    Ht 5' 3.5\" (1.613 m)    Wt 104 lb (47.2 kg)    SpO2 98%    BMI 18.13 kg/m2     General appearance: alert, cooperative, no distress, appears stated age  Head: Normocephalic, without obvious abnormality, atraumatic  Extremities: right knee: no effusion noted. No erythema. There is a 2 x 3cm mass about 4 inches inferior to the medial border of her patella. Tender to touch    Assessment/Plan:     1. Knee mass, right  -will get MRI imaging for further evaluation.     - MRI KNEE RT W  WO CONT; Future     Follow-up Disposition:     Return if symptoms worsen or fail to improve. Advised patient to call back or return to office if symptoms worsen/change/persist.     Discussed expected course/resolution/complications of diagnosis in detail with patient. Medication risks/benefits/costs/interactions/alternatives discussed with patient. Patient was given an after visit summary which includes diagnoses, current medications, & vitals. Patient expressed understanding with the diagnosis and plan.

## 2017-12-07 NOTE — PROGRESS NOTES
Patient's identity verified with two patient identifiers (name and date of birth). 1. Have you been to the ER, urgent care clinic since your last visit? Hospitalized since your last visit? No  2. Have you seen or consulted any other health care providers outside of the [address] [place] [address] since your last visit? Include any pap smears or colon screening. No    Chief Complaint   Patient presents with    Leg Pain     \"Lump\", medial calf of Right leg. Noticed it this morning. Size of a quarter, thinks it has decreased in size since this AM.  Pain with pressure. Has seen x2 orthopedists for R knee injury, unsure if this is related. Denies pain with flexion/extension, warmth at site, or redness. Complains \"painful sensation has moved around\". Not fasting. Health Maintenance Due   Topic Date Due    Influenza Age 5 to Adult   Has not had- declines.  08/01/2017

## 2017-12-07 NOTE — MR AVS SNAPSHOT
Visit Information Date & Time Provider Department Dept. Phone Encounter #  
 12/7/2017 11:00 AM MD [facility] [id number] Follow-up Instructions Return if symptoms worsen or fail to improve. Your Appointments 3/9/2018 11:20 AM  
ROUTINE CARE with MD [facility] ([address]) Appt Note: 3m fu; 6m restless legs and anxiety [address], [address] [place] [address]  
[address], [place] [address] [phone] Upcoming Health Maintenance Date Due  
 PAP AKA CERVICAL CYTOLOGY 7/1/2018 BREAST CANCER SCRN MAMMOGRAM 7/7/2018 DTaP/Tdap/Td series (2 - Td) 12/17/2018 COLONOSCOPY 2/8/2023 Allergies as of 12/7/2017  Review Complete On: 12/7/2017 By: Emerson Weber MD  
  
 Severity Noted Reaction Type Reactions Ambien Cr [Zolpidem] Medium 10/08/2012   Side Effect Other (comments) Agitation & mood lability; higher dosages only Requip [Ropinirole]  11/29/2012   Side Effect Nausea Only Current Immunizations  Reviewed on 6/15/2017 Name Date Hep B Vaccine 4/28/1998, 3/24/1998, 3/24/1998 Tdap 12/17/2008 Not reviewed this visit You Were Diagnosed With   
  
 Codes Comments Knee mass, right    -  Primary ICD-10-CM: Y12.116 ICD-9-CM: 719.66 Vitals BP Pulse Temp Resp Height(growth percentile) Weight(growth percentile) 106/68 (BP 1 Location: Left arm, BP Patient Position: Sitting) 79 97.1 °F (36.2 °C) (Oral) 14 5' 3.5\" (1.613 m) 104 lb (47.2 kg) SpO2 BMI OB Status Smoking Status 98% 18.13 kg/m2 Postmenopausal Never Smoker Vitals History BMI and BSA Data Body Mass Index Body Surface Area  
 18.13 kg/m 2 1.45 m 2 Preferred Pharmacy Pharmacy Name Phone [facility] [address] [phone] Your Updated Medication List  
  
   
 This list is accurate as of: 12/7/17 11:49 AM.  Always use your most recent med list.  
  
  
  
  
 BOSWELLIA EDI XT (BULK)  
by Does Not Apply route. citalopram 20 mg tablet Commonly known as:  Kvng Kang Take 1 Tab by mouth daily. clonazePAM 0.5 mg tablet Commonly known as:  KlonoPIN  
TAKE 1 AND 1/2 TABLETS BY MOUTH AT BEDTIME AS NEEDED *MAX DAILY AMOUNT 1 AND 1/2* progesterone 100 mg capsule Commonly known as:  PROMETRIUM  
  
 rOPINIRole 4 mg Tab TAB Commonly known as:  Francyne Lav Take 1 Tab by mouth nightly. * VAGIFEM 10 mcg Tab vaginal tablet Generic drug:  estradiol * estradiol 0.025 mg/24 hr Ptsw  
  
 valACYclovir 500 mg tablet Commonly known as:  VALTREX Take 1 Tab by mouth two (2) times a day. For 1-3 days as needed. * Notice: This list has 2 medication(s) that are the same as other medications prescribed for you. Read the directions carefully, and ask your doctor or other care provider to review them with you. Follow-up Instructions Return if symptoms worsen or fail to improve. To-Do List   
 12/07/2017 Imaging:  MRI KNEE RT W  WO CONT Referral Information Referral ID Referred By Referred To  
  
 [id number] [facility] Not Available Visits Status Start Date End Date 1 New Request 12/7/17 12/7/18 If your referral has a status of pending review or denied, additional information will be sent to support the outcome of this decision. Introducing [facility] & HEALTH SERVICES! [address] [facility] introduces Replicon patient portal. Now you can access parts of your medical record, email your doctor's office, and request medication refills online. 1. In your internet browser, go to [email or web address] "[email or web address] 2. Click on the First Time User? Click Here link in the Sign In box. You will see the New Member Sign Up page. 3. Enter your Replicon Access Code exactly as it appears below.  You will not need to use this code after youve completed the sign-up process. If you do not sign up before the expiration date, you must request a new code. · [employer] Access Code: [email or web address] Expires: 12/13/2017 11:03 AM 
 
4. Enter the last four digits of your Social Security Number (xxxx) and Date of Birth (mm/dd/yyyy) as indicated and click Submit. You will be taken to the next sign-up page. 5. Create a [employer] ID. This will be your [employer] login ID and cannot be changed, so think of one that is secure and easy to remember. 6. Create a [employer] password. You can change your password at any time. 7. Enter your Password Reset Question and Answer. This can be used at a later time if you forget your password. 8. Enter your e-mail address. You will receive e-mail notification when new information is available in [address] 9. Click Sign Up. You can now view and download portions of your medical record. 10. Click the Download Summary menu link to download a portable copy of your medical information. If you have questions, please visit the Frequently Asked Questions section of the [employer] website. Remember, [employer] is NOT to be used for urgent needs. For medical emergencies, dial 911. Now available from your iPhone and Android! Please provide this summary of care documentation to your next provider. Your primary care clinician is listed as Ara Turner. If you have any questions after today's visit, please call [phone].

## 2017-12-17 ENCOUNTER — HOSPITAL ENCOUNTER (OUTPATIENT)
Dept: MRI IMAGING | Age: 62
Discharge: HOME | End: 2017-12-17
Attending: INTERNAL MEDICINE
Payer: COMMERCIAL

## 2017-12-17 VITALS — BODY MASS INDEX: 18.83 KG/M2 | WEIGHT: 108 LBS

## 2017-12-17 DIAGNOSIS — R22.41 KNEE MASS, RIGHT: ICD-10-CM

## 2017-12-17 PROCEDURE — A9576 INJ PROHANCE MULTIPACK: HCPCS | Performed by: INTERNAL MEDICINE

## 2017-12-17 PROCEDURE — 74011250636 HC RX REV CODE- 250/636: Performed by: INTERNAL MEDICINE

## 2017-12-17 PROCEDURE — 73723 MRI JOINT LWR EXTR W/O&W/DYE: CPT

## 2017-12-17 RX ADMIN — GADOTERIDOL 10 ML: 279.3 INJECTION, SOLUTION INTRAVENOUS at 14:43

## 2017-12-18 ENCOUNTER — TELEPHONE (OUTPATIENT)
Dept: INTERNAL MEDICINE CLINIC | Age: 62
End: 2017-12-18

## 2017-12-18 NOTE — TELEPHONE ENCOUNTER
Discussed with patient that the MRI of her right knee showed that her Baker's cyst was dissecting forward and she has a significant meniscal tear. She is going to follow up with ortho at this time.

## 2017-12-22 ENCOUNTER — TELEPHONE (OUTPATIENT)
Dept: INTERNAL MEDICINE CLINIC | Age: 62
End: 2017-12-22

## 2017-12-22 NOTE — TELEPHONE ENCOUNTER
Ruthie Chow MD   You 5 minutes ago (3:06 PM)                I have signed that I have reviewed it which automatically releases it. Ayah Laboys should be in her mychart now.  If not please have her call the [employer] help number.  She doesn't need to take it to . Jayna Mendiola has access to the MRI. (Routing comment)

## 2017-12-22 NOTE — TELEPHONE ENCOUNTER
Germania Rodríguez 1955    Pt would like results of recent MRI released to [facility] so that she has access to them.  Pt also would like Dr. Shawn Carrillo to know she has an appointment with Dr. Kevin Rockwell for 12/26/17

## 2017-12-22 NOTE — TELEPHONE ENCOUNTER
Spoke with patient, two identifiers verified. Advised of below per Dr. Luis F Alvarenga.  States she cannot see it and has talked to MyChart help- advised to contact them again if she would like to view at home, per Dr. Luis F Alvarenga, it was released. Reassured Dr. Ayad Justice has access to MRI results and can view regardless. Patient verbalizes understanding.

## 2018-03-09 ENCOUNTER — OFFICE VISIT (OUTPATIENT)
Dept: INTERNAL MEDICINE CLINIC | Age: 63
End: 2018-03-09

## 2018-03-09 VITALS
BODY MASS INDEX: 17.42 KG/M2 | HEIGHT: 64 IN | WEIGHT: 102 LBS | OXYGEN SATURATION: 98 % | DIASTOLIC BLOOD PRESSURE: 70 MMHG | SYSTOLIC BLOOD PRESSURE: 94 MMHG | RESPIRATION RATE: 13 BRPM | HEART RATE: 77 BPM | TEMPERATURE: 99 F

## 2018-03-09 DIAGNOSIS — F32.A DEPRESSION, UNSPECIFIED DEPRESSION TYPE: Primary | ICD-10-CM

## 2018-03-09 DIAGNOSIS — G25.81 RESTLESS LEG: ICD-10-CM

## 2018-03-09 DIAGNOSIS — Z79.899 ENCOUNTER FOR LONG-TERM (CURRENT) USE OF MEDICATIONS: ICD-10-CM

## 2018-03-09 RX ORDER — ESTRADIOL 10 UG/1
10 INSERT VAGINAL
COMMUNITY
Start: 2017-12-18

## 2018-03-09 RX ORDER — PRAMIPEXOLE DIHYDROCHLORIDE 0.5 MG/1
0.5 TABLET ORAL
COMMUNITY
Start: 2018-02-07 | End: 2018-06-22 | Stop reason: SDUPTHER

## 2018-03-09 RX ORDER — PROGESTERONE 100 MG/1
CAPSULE ORAL
COMMUNITY
Start: 2017-11-26 | End: 2018-03-09

## 2018-03-09 RX ORDER — CLONAZEPAM 0.5 MG/1
TABLET ORAL
COMMUNITY
Start: 2017-11-15 | End: 2018-03-09

## 2018-03-09 NOTE — MR AVS SNAPSHOT
[address] [facility], [address] [place] 
[phone] Patient: Mariia Valera MRN: [id number] ACF:[id number] Visit Information Date & Time Provider Department Dept. Phone Encounter #  
 3/9/2018 11:20 AM MD [facility] [phone] Your Appointments 6/22/2018 10:00 AM  
COMPLETE 40 with MD [facility] ([facility]) Appt Note: FOR CPE  
 [address], [address] [place] [address]  
[address], [address] [phone] Upcoming Health Maintenance Date Due  
 PAP AKA CERVICAL CYTOLOGY 7/1/2018 BREAST CANCER SCRN MAMMOGRAM 7/7/2018 DTaP/Tdap/Td series (2 - Td) 12/17/2018 COLONOSCOPY 2/8/2023 Allergies as of 3/9/2018  Review Complete On: 3/9/2018 By: Shavon Stanley MD  
  
 Severity Noted Reaction Type Reactions Ambien Cr [Zolpidem] Medium 10/08/2012   Side Effect Other (comments) Agitation & mood lability; higher dosages only Requip [Ropinirole]  11/29/2012   Side Effect Nausea Only Current Immunizations  Reviewed on 6/15/2017 Name Date Hep B Vaccine 4/28/1998, 3/24/1998, 3/24/1998 Tdap 12/17/2008 Not reviewed this visit You Were Diagnosed With   
  
 Codes Comments Depression, unspecified depression type    -  Primary ICD-10-CM: F32.9 ICD-9-CM: 393 Vitals BP Pulse Temp Resp Height(growth percentile) Weight(growth percentile) 94/70 (BP 1 Location: Left arm, BP Patient Position: Sitting) 77 99 °F (37.2 °C) (Oral) 13 5' 3.5\" (1.613 m) 102 lb (46.3 kg) SpO2 BMI OB Status Smoking Status 98% 17.79 kg/m2 Postmenopausal Never Smoker Vitals History BMI and BSA Data Body Mass Index Body Surface Area  
 17.79 kg/m 2 1.44 m 2 Preferred Pharmacy Pharmacy Name Phone [facility] [address] [phone] Your Updated Medication List  
  
   
This list is accurate as of 3/9/18 12:03 PM.  Always use your most recent med list.  
  
  
  
  
 BOSWELLIA EDI XT (BULK)  
by Does Not Apply route. citalopram 20 mg tablet Commonly known as:  Ray Vanessa Take 1 Tab by mouth daily. clonazePAM 0.5 mg tablet Commonly known as:  KlonoPIN  
TAKE 1 AND 1/2 TABLETS BY MOUTH AT BEDTIME AS NEEDED *MAX DAILY AMOUNT 1 AND 1/2* * estradiol 0.025 mg/24 hr Ptsw * estradiol 10 mcg Tab vaginal tablet Commonly known as:  VAGIFEM  
  
 pramipexole 0.5 mg tablet Commonly known as:  MIRAPEX  
  
 progesterone 100 mg capsule Commonly known as:  PROMETRIUM Take 100 mg by mouth daily. valACYclovir 500 mg tablet Commonly known as:  VALTREX Take 1 Tab by mouth two (2) times a day. For 1-3 days as needed. * Notice: This list has 2 medication(s) that are the same as other medications prescribed for you. Read the directions carefully, and ask your doctor or other care provider to review them with you. Patient Instructions Weaning Celexa Days 1-7 celexa 10mg daily Days 8-14 celexa 5mg daily Days 15-21 celexa 5mg every other day. Day 22 stop celexa Introducing [facility] [place] [facility]! Dear Mike Baron: 
Thank you for requesting a [employer] account. Our records indicate that you already have an active [employer] account. You can access your account anytime at [email or web address] Did you know that you can access your hospital and ER discharge instructions at any time in [employer]? You can also review all of your test results from your hospital stay or ER visit. Additional Information If you have questions, please visit the Frequently Asked Questions section of the [employer] website at [email or web address] Remember, [employer] is NOT to be used for urgent needs. For medical emergencies, dial 911. Now available from your iPhone and Android! Please provide this summary of care documentation to your next provider. Your primary care clinician is listed as Ara Turner. If you have any questions after today's visit, please call [phone].

## 2018-03-09 NOTE — PROGRESS NOTES
Subjective:      Grace Andersen is a 60 y.o. female who presents today for follow up of her depression with anxiety that has a seasonal affective component. She was restarted on celexa in the fall due to some recurence of her depression with less day light. She has done quite well on celexa 20mg daily. She would like to wean off at this time. She is taking boswellia for her knee pain and pain is much improved. Patient Active Problem List   Diagnosis Code    Restless legs syndrome G25.81    Sleep disorder G47.9    Osteopenia M85.80    Celiac disease K90.0    Iron deficiency E61.1    Pap smear for cervical cancer screening Z12.4    History of screening mammography Z92.89    History of bone density study Z92.89    Colon cancer screening Z12.11    Herpetic lesion B00.9    Lumbar disc disease with radiculopathy M51.16    Depression with anxiety F41.8    Advance directive discussed with patient Z71.89     Current Outpatient Prescriptions   Medication Sig Dispense Refill    estradiol (VAGIFEM) 10 mcg tab vaginal tablet       pramipexole (MIRAPEX) 0.5 mg tablet       BOSWELLIA EDI EXTRACT (BOSWELLIA EDI XT, BULK,) by Does Not Apply route.  clonazePAM (KLONOPIN) 0.5 mg tablet TAKE 1 AND 1/2 TABLETS BY MOUTH AT BEDTIME AS NEEDED *MAX DAILY AMOUNT 1 AND 1/2* 45 Tab 3    citalopram (CELEXA) 20 mg tablet Take 1 Tab by mouth daily. 30 Tab 5    estradiol 0.025 mg/24 hr ptsw       progesterone (PROMETRIUM) 100 mg capsule Take 100 mg by mouth daily.  valACYclovir (VALTREX) 500 mg tablet Take 1 Tab by mouth two (2) times a day. For 1-3 days as needed. 30 Tab 3        Review of Systems    Pertinent items are noted in HPI.      Objective:     Visit Vitals    BP 94/70 (BP 1 Location: Left arm, BP Patient Position: Sitting)    Pulse 77    Temp 99 °F (37.2 °C) (Oral)    Resp 13    Ht 5' 3.5\" (1.613 m)    Wt 102 lb (46.3 kg)    SpO2 98%    BMI 17.79 kg/m2     General appearance: alert, cooperative, no distress, appears stated age  Head: Normocephalic, without obvious abnormality, atraumatic  Lungs: clear to auscultation bilaterally  Heart: regular rate and rhythm, S1, S2 normal, no murmur, click, rub or gallop  Neurologic: Mental status: Alert, oriented, thought content appropriate, affect: stable and normal    Assessment/Plan:     1. Depression, unspecified depression type  -instruction on tapering off of her celexa reviewed with patient. 2. Restless leg  -I evaluated and recommended to continue current doses of medications. Follow-up Disposition:     Has physical scheduled for June, 2018. Return if symptoms worsen or fail to improve. Advised patient to call back or return to office if symptoms worsen/change/persist.     Discussed expected course/resolution/complications of diagnosis in detail with patient. Medication risks/benefits/costs/interactions/alternatives discussed with patient. Patient was given an after visit summary which includes diagnoses, current medications, & vitals. Patient expressed understanding with the diagnosis and plan.

## 2018-03-09 NOTE — PROGRESS NOTES
Patient's identity verified with two patient identifiers (name and date of birth). 1. Have you been to the ER, urgent care clinic since your last visit? Hospitalized since your last visit? No  2. Have you seen or consulted any other health care providers outside of the [address] [place] [address] since your last visit? Include any pap smears or colon screening. Noo    Chief Complaint   Patient presents with    Anxiety     6 month follow up, would like to discuss Celexa    Leg Problem     Restless legs, x6 month follow up. Not fasting. Health Maintenance Due   Topic Date Due    PAP AKA CERVICAL CYTOLOGY   Scheduled 4/2018, Dr. Cheema [facility] 07/01/2018    BREAST CANCER SCRN MAMMOGRAM   Scheduled 4/2018, Dr. Cheema [facility] 07/07/2018     Reviewed record in preparation for visit and have obtained necessary documentation.     Wt Readings from Last 3 Encounters:   03/09/18 102 lb (46.3 kg)   12/17/17 108 lb (49 kg)   12/07/17 104 lb (47.2 kg)     Temp Readings from Last 3 Encounters:   03/09/18 99 °F (37.2 °C) (Oral)   12/07/17 97.1 °F (36.2 °C) (Oral)   09/14/17 97.9 °F (36.6 °C) (Oral)     BP Readings from Last 3 Encounters:   03/09/18 94/70   12/07/17 106/68   09/14/17 98/74     Pulse Readings from Last 3 Encounters:   03/09/18 77   12/07/17 79   09/14/17 74

## 2018-03-09 NOTE — PATIENT INSTRUCTIONS
Weaning Celexa    Days 1-7 celexa 10mg daily  Days 8-14 celexa 5mg daily  Days 15-21 celexa 5mg every other day.   Day 22 stop celexa

## 2018-04-28 DIAGNOSIS — G47.9 SLEEP DISORDER: ICD-10-CM

## 2018-04-28 DIAGNOSIS — F41.9 ANXIETY: ICD-10-CM

## 2018-05-02 RX ORDER — CLONAZEPAM 0.5 MG/1
TABLET ORAL
Qty: 45 TAB | Refills: 1 | OUTPATIENT
Start: 2018-05-02 | End: 2018-06-22 | Stop reason: SDUPTHER

## 2018-05-02 NOTE — TELEPHONE ENCOUNTER
Orders Placed This Encounter    clonazePAM (KLONOPIN) 0.5 mg tablet     Sig: TAKE 1 AND 1/2 TABLET BY MOUTH EVERY NIGHT AT BEDTIME AS NEEDED . Raquel MENDEZ AMOUNT 1 1/2 TABLETS     Dispense:  45 Tab     Refill:  1      reviewed 5/2/2018

## 2018-06-18 ENCOUNTER — OFFICE VISIT (OUTPATIENT)
Dept: INTERNAL MEDICINE CLINIC | Age: 63
End: 2018-06-18

## 2018-06-18 VITALS
SYSTOLIC BLOOD PRESSURE: 102 MMHG | HEART RATE: 75 BPM | TEMPERATURE: 97.7 F | OXYGEN SATURATION: 98 % | WEIGHT: 104.6 LBS | HEIGHT: 64 IN | DIASTOLIC BLOOD PRESSURE: 68 MMHG | RESPIRATION RATE: 16 BRPM | BODY MASS INDEX: 17.86 KG/M2
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
## 2018-06-18 NOTE — PROGRESS NOTES
Crow Mukherjee is a 58 y.o. female     Chief Complaint   Patient presents with   [address]     on the back of left thigh patient noticed it yesterday. Visit Vitals    Resp 16    Ht 5' 3.5\" (1.613 m)    Wt 104 lb 9.6 oz (47.4 kg)    BMI 18.24 kg/m2       1. Have you been to the ER, urgent care clinic since your last visit? Hospitalized since your last visit? No    2. Have you seen or consulted any other health care providers outside of the [address] [place] [address] since your last visit? Include any pap smears or colon screening.  No

## 2018-06-18 NOTE — PROGRESS NOTES
61 yo female yesterday noted an area of irritation on back of L thigh. She had been hiking in a park in [place] earlier in the day. She saw no tick or other insect, and did not feel the initial insult. She also walks her dog early in the day most days at home, and could have been bitten by a mosquito. No systemic signs of illness. She has applied some Cortaid topically, but it has continued to itch. PE: WNWD WF   Skin - L posterior thigh - approx 2 cm area of redness with central hard center    Imp: Insect Bite    Plan: Cool compresses   Topicals such as Benedryl cream or Cortisone    She will have Dr. Meghna Kaye look at the area when she returns in 4 days for her physical  _________________________  Expected course of current diagnosed problem(s) as well as expected progression and possible complications, and desired follow up with provider are discussed with patient. Patient is encouraged to be back in touch with any questions or concerns. Patient expresses understanding of plan of care. Patient is given AVS which includes diagnoses, current medications, vitals.

## 2018-06-18 NOTE — PATIENT INSTRUCTIONS
Cool compress to the insect bite to calm itch    Benedryl cream may help calm the itching    Cortisone cream may help the inflammatory part of the area of redness

## 2018-06-22 ENCOUNTER — OFFICE VISIT (OUTPATIENT)
Dept: INTERNAL MEDICINE CLINIC | Age: 63
End: 2018-06-22

## 2018-06-22 VITALS
DIASTOLIC BLOOD PRESSURE: 62 MMHG | RESPIRATION RATE: 14 BRPM | SYSTOLIC BLOOD PRESSURE: 100 MMHG | HEIGHT: 64 IN | WEIGHT: 106 LBS | OXYGEN SATURATION: 98 % | HEART RATE: 78 BPM | BODY MASS INDEX: 18.1 KG/M2 | TEMPERATURE: 99.7 F

## 2018-06-22 DIAGNOSIS — Z12.11 SCREEN FOR COLON CANCER: ICD-10-CM

## 2018-06-22 DIAGNOSIS — Z00.00 ROUTINE GENERAL MEDICAL EXAMINATION AT A HEALTH CARE FACILITY: Primary | ICD-10-CM

## 2018-06-22 DIAGNOSIS — G47.9 SLEEP DISORDER: ICD-10-CM

## 2018-06-22 DIAGNOSIS — F41.9 ANXIETY: ICD-10-CM

## 2018-06-22 DIAGNOSIS — G25.81 RESTLESS LEGS: ICD-10-CM

## 2018-06-22 RX ORDER — CLONAZEPAM 0.5 MG/1
TABLET ORAL
Qty: 45 TAB | Refills: 1 | Status: SHIPPED | OUTPATIENT
Start: 2018-06-22 | End: 2018-07-30 | Stop reason: SDUPTHER

## 2018-06-22 RX ORDER — PRAMIPEXOLE DIHYDROCHLORIDE 0.5 MG/1
0.5 TABLET ORAL
Qty: 90 TAB | Refills: 1 | Status: SHIPPED | OUTPATIENT
Start: 2018-06-22 | End: 2018-11-14 | Stop reason: SDUPTHER

## 2018-06-22 NOTE — PROGRESS NOTES
Patient's identity verified with two patient identifiers (name and date of birth). Reviewed record in preparation for visit and have obtained necessary documentation. 1. Have you been to the ER, urgent care clinic since your last visit? Hospitalized since your last visit? No  2. Have you seen or consulted any other health care providers outside of the [address] [place] [address] since your last visit? Include any pap smears or colon screening. No    Chief Complaint   Patient presents with    Complete Physical     Not fasting. EKG due. Not fasting. Health Maintenance Due   Topic Date Due    PAP AKA CERVICAL CYTOLOGY   Done per pt, x3mos, Dr. Melvin Hogue. 07/01/2018    BREAST CANCER SCRN MAMMOGRAM   Due next month & aware.  07/07/2018       Wt Readings from Last 3 Encounters:   06/22/18 106 lb (48.1 kg)   06/18/18 104 lb 9.6 oz (47.4 kg)   03/09/18 102 lb (46.3 kg)     Temp Readings from Last 3 Encounters:   06/22/18 99.7 °F (37.6 °C) (Oral)   06/18/18 97.7 °F (36.5 °C) (Oral)   03/09/18 99 °F (37.2 °C) (Oral)     BP Readings from Last 3 Encounters:   06/22/18 100/62   06/18/18 102/68   03/09/18 94/70     Pulse Readings from Last 3 Encounters:   06/22/18 78   06/18/18 75   03/09/18 77       Learning Assessment:  :     Learning Assessment 6/22/2018 6/16/2017 2/6/2015 12/2/2013 2/15/2013   PRIMARY LEARNER Patient Patient Patient Patient Patient   HIGHEST LEVEL OF EDUCATION - PRIMARY LEARNER  > 4 YEARS OF COLLEGE - > 4 YEARS OF COLLEGE 4 YEARS OF COLLEGE -   BARRIERS PRIMARY LEARNER NONE - NONE NONE -   CO-LEARNER CAREGIVER No - No No -   PRIMARY LANGUAGE ENGLISH ENGLISH ENGLISH ENGLISH ENGLISH    NEED - - No - -   LEARNER PREFERENCE PRIMARY VIDEOS DEMONSTRATION DEMONSTRATION DEMONSTRATION DEMONSTRATION     - - - - READING     - - - - PICTURES   LEARNING SPECIAL TOPICS - - no - -   ANSWERED BY patient patient patient self patient   RELATIONSHIP SELF SELF SELF SELF SELF

## 2018-06-22 NOTE — PROGRESS NOTES
Subjective:      Emeka Ratliff is a 58 y.o. female who presents today for her annual exam and for her depression and restless legs. Gyn care is provided by Dr. Alexandra Andrade. - last visit was 4/2017  Screening mammogram was done at [facility] - date- 1/2017  Screening colonoscopy was last completed Dr. Andres Bernal -2003    Exercise -  Regular. No new concerns at this time. Patient Active Problem List   Diagnosis Code    Restless legs syndrome G25.81    Sleep disorder G47.9    Osteopenia M85.80    Celiac disease K90.0    Iron deficiency E61.1    Pap smear for cervical cancer screening Z12.4    History of screening mammography Z92.89    History of bone density study Z92.89    Colon cancer screening Z12.11    Herpetic lesion B00.9    Lumbar disc disease with radiculopathy M51.16    Depression with anxiety F41.8    Advance directive discussed with patient Z71.89     Current Outpatient Prescriptions   Medication Sig Dispense Refill    OTHER Chinese herbs      clonazePAM (KLONOPIN) 0.5 mg tablet TAKE 1 AND 1/2 TABLET BY MOUTH EVERY NIGHT AT BEDTIME AS NEEDED . Gisela Belch MAXDAILY AMOUNT 1 1/2 TABLETS 45 Tab 1    estradiol (VAGIFEM) 10 mcg tab vaginal tablet Insert 10 mcg into vagina every Tuesday and Friday.  pramipexole (MIRAPEX) 0.5 mg tablet Take 0.5 mg by mouth nightly.  BOSWELLIA EDI EXTRACT (BOSWELLIA EDI XT, BULK,) Take 1 Cap by mouth daily.  estradiol 0.025 mg/24 hr ptsw Apply 1 Patch to affected area two (2) days a week.  progesterone (PROMETRIUM) 100 mg capsule Take 100 mg by mouth daily.  valACYclovir (VALTREX) 500 mg tablet Take 1 Tab by mouth two (2) times a day. For 1-3 days as needed. 30 Tab 3        Review of Systems    A comprehensive review of systems was negative except for that written in the HPI.      Objective:     Visit Vitals    /62 (BP 1 Location: Left arm, BP Patient Position: Sitting)    Pulse 78    Temp 99.7 °F (37.6 °C) (Oral)    Resp 14  Ht 5' 3.5\" (1.613 m)    Wt 106 lb (48.1 kg)    SpO2 98%    BMI 18.48 kg/m2     General:  Alert, cooperative, no distress, appears stated age. Head:  Normocephalic, without obvious abnormality, atraumatic. Eyes:  Conjunctivae/corneas clear. PERRL, EOMs intact. Ears:  Normal TMs and external ear canals both ears. Nose: Nares normal. Septum midline. Mucosa normal. No drainage or sinus tenderness. Throat: Lips, mucosa, and tongue normal. Teeth and gums normal.   Neck: Supple, symmetrical, trachea midline, no adenopathy, thyroid: no enlargement/tenderness/nodules, no carotid bruit and no JVD. Back:   Symmetric, no curvature. ROM normal. No CVA tenderness. Lungs:   Clear to auscultation bilaterally. Chest wall:  No tenderness or deformity. Heart:  Regular rate and rhythm, S1, S2 normal, no murmur, click, rub or gallop. Breast Exam:  No tenderness, masses, or nipple abnormality. Abdomen:   Soft, non-tender. Bowel sounds normal. No masses,  No organomegaly. Extremities: Extremities normal, atraumatic, no cyanosis or edema. Pulses: 2+ and symmetric all extremities. Skin: Skin color, texture, turgor normal. No rashes or lesions. Lymph nodes: Cervical, supraclavicular, and axillary nodes normal.   Neurologic: CNII-XII intact. Normal strength, sensation and reflexes throughout. Assessment/Plan:     1. Routine general medical examination at a health care facility  -up to date with gyn care  - medical release forms completed to obtain note of gyn exam   -due for screening . mammogram at this time.   -also due for screening colonoscopy - last done by Dr. José Miguel Weathers. Patient referred to Dr. Moon Go. -recommended shingles vaccine - script for shingrix given to patient. - AMB POC EKG ROUTINE W/ 12 LEADS, INTER & REP  - CBC WITH AUTOMATED DIFF  - METABOLIC PANEL, COMPREHENSIVE  - LIPID PANEL  - UA/M W/RFLX CULTURE, ROUTINE    Also, she has additional complaints of the following --. 2. Anxiety  -clonazepam refilled.  reviewed 2018   -weaned off celexa in ,  Reevaluate in the fall , may need to restart due to some seasonal affective componenet. - clonazePAM (KLONOPIN) 0.5 mg tablet; TAKE 1 AND 1/2 TABLET BY MOUTH EVERY NIGHT AT BEDTIME AS NEEDED . Pilar Loose MAXDAILY AMOUNT 1 1/2 TABLETS  Dispense: 39 Tab; Refill: 1    3. Sleep disorder    - clonazePAM (KLONOPIN) 0.5 mg tablet; TAKE 1 AND 1/2 TABLET BY MOUTH EVERY NIGHT AT BEDTIME AS NEEDED . Pilar Loose MAXDAILY AMOUNT 1 1/2 TABLETS  Dispense: 39 Tab; Refill: 1    4. Screen for colon cancer    - [facility]     5. Restless legs.   -I evaluated and recommended to continue current doses of medications. Orders Placed This Encounter    CBC WITH AUTOMATED DIFF    METABOLIC PANEL, COMPREHENSIVE    LIPID PANEL    UA/M W/RFLX CULTURE, ROUTINE    [facility]     Referral Priority:   Routine     Referral Type:   Consultation     Referral Reason:   Specialty Services Required     Referral Location:   [facility]     Referred to Provider:   Nayeli Mora. Lynnette Rush MD    AMB POC EKG ROUTINE W/ 12 LEADS, INTER & REP     Order Specific Question:   Reason for Exam:     Answer:   complete physical    clonazePAM (KLONOPIN) 0.5 mg tablet     Sig: TAKE 1 AND 1/2 TABLET BY MOUTH EVERY NIGHT AT BEDTIME AS NEEDED . Pilar Loose MAXDAILY AMOUNT 1 1/2 TABLETS     Dispense:  45 Tab     Refill:  1    pramipexole (MIRAPEX) 0.5 mg tablet     Sig: Take 1 Tab by mouth nightly. Dispense:  90 Tab     Refill:  1    varicella-zoster recombinant, PF, (SHINGRIX) 50 mcg/0.5 mL susr injection     Si.5 mL by IntraMUSCular route once for 1 dose. Repeat in 2-4 months     Dispense:  0.5 mL     Refill:  1      Follow-up Disposition:     Follow up in 6 months     Return if symptoms worsen or fail to improve.    Advised patient to call back or return to office if symptoms worsen/change/persist.     Discussed expected course/resolution/complications of diagnosis in detail with patient. Medication risks/benefits/costs/interactions/alternatives discussed with patient. Patient was given an after visit summary which includes diagnoses, current medications, & vitals. Patient expressed understanding with the diagnosis and plan.

## 2018-06-22 NOTE — MR AVS SNAPSHOT
[address] [place] [address], [address] [place] [address] 
[phone] Patient: Filipe Zaragoza MRN: [id number] DDC:[birth year] Visit Information Date & Time Provider Department Dept. Phone Encounter #  
 6/22/2018 10:00 AM Magui Brothers MD [facility] [id number] Follow-up Instructions Return in about 6 months (around 12/22/2018) for depression. Upcoming Health Maintenance Date Due  
 PAP AKA CERVICAL CYTOLOGY 7/1/2018 BREAST CANCER SCRN MAMMOGRAM 7/7/2018 Influenza Age 5 to Adult 8/1/2018 DTaP/Tdap/Td series (2 - Td) 12/17/2018 COLONOSCOPY 2/8/2023 Allergies as of 6/22/2018  Review Complete On: 6/22/2018 By: Magui Brothers MD  
  
 Severity Noted Reaction Type Reactions Ambien Cr [Zolpidem] Medium 10/08/2012   Side Effect Other (comments) Agitation & mood lability; higher dosages only Requip [Ropinirole]  11/29/2012   Side Effect Nausea Only Current Immunizations  Reviewed on 6/22/2018 Name Date Hep B Vaccine 4/28/1998, 3/24/1998, 3/24/1998 Tdap 12/17/2008 Reviewed by Magui Brothers MD on 6/22/2018 at 10:33 AM  
 Reviewed by Magui Brothers MD on 6/22/2018 at 10:39 AM  
You Were Diagnosed With   
  
 Codes Comments Routine general medical examination at a health care facility    -  Primary ICD-10-CM: Z00.00 ICD-9-CM: V70.0 Anxiety     ICD-10-CM: F41.9 ICD-9-CM: 300.00 Sleep disorder     ICD-10-CM: G47.9 ICD-9-CM: 780.50 Screen for colon cancer     ICD-10-CM: Z12.11 ICD-9-CM: V76.51 Vitals BP Pulse Temp Resp Height(growth percentile) Weight(growth percentile) 100/62 (BP 1 Location: Left arm, BP Patient Position: Sitting) 78 99.7 °F (37.6 °C) (Oral) 14 5' 3.5\" (1.613 m) 106 lb (48.1 kg) SpO2 BMI OB Status Smoking Status 98% 18.48 kg/m2 Postmenopausal Never Smoker Vitals History BMI and BSA Data Body Mass Index Body Surface Area 18.48 kg/m 2 1.47 m 2 Preferred Pharmacy Pharmacy Name Phone [facility] [phone] Your Updated Medication List  
  
   
This list is accurate as of 18 10:48 AM.  Always use your most recent med list.  
  
  
  
  
 BOSWELLIA EDI XT (BULK) Take 1 Cap by mouth daily. clonazePAM 0.5 mg tablet Commonly known as:  KlonoPIN  
TAKE 1 AND 1/2 TABLET BY MOUTH EVERY NIGHT AT BEDTIME AS NEEDED . Heddie Bruno MAXDAILY AMOUNT 1 1/2 TABLETS  
  
 * estradiol 0.025 mg/24 hr Ptsw Apply 1 Patch to affected area two (2) days a week. * estradiol 10 mcg Tab vaginal tablet Commonly known as:  Hurley Diones Insert 10 mcg into vagina every Tuesday and Friday. OTHER Chinese herbs  
  
 pramipexole 0.5 mg tablet Commonly known as:  MIRAPEX Take 1 Tab by mouth nightly. progesterone 100 mg capsule Commonly known as:  PROMETRIUM Take 100 mg by mouth daily. valACYclovir 500 mg tablet Commonly known as:  VALTREX Take 1 Tab by mouth two (2) times a day. For 1-3 days as needed. varicella-zoster recombinant (PF) 50 mcg/0.5 mL Susr injection Commonly known as:  SHINGRIX  
0.5 mL by IntraMUSCular route once for 1 dose. Repeat in 2-4 months * Notice: This list has 2 medication(s) that are the same as other medications prescribed for you. Read the directions carefully, and ask your doctor or other care provider to review them with you. Prescriptions Printed Refills  
 clonazePAM (KLONOPIN) 0.5 mg tablet 1 Sig: TAKE 1 AND 1/2 TABLET BY MOUTH EVERY NIGHT AT BEDTIME AS NEEDED . Heddie Bruno MAXDAILY AMOUNT 1 1/2 TABLETS Class: Print  
 varicella-zoster recombinant, PF, (SHINGRIX) 50 mcg/0.5 mL susr injection 1 Si.5 mL by IntraMUSCular route once for 1 dose. Repeat in 2-4 months Class: Print Route: IntraMUSCular Prescriptions Sent to Pharmacy Refills pramipexole (MIRAPEX) 0.5 mg tablet 1 Sig: Take 1 Tab by mouth nightly. Class: Normal  
 [facility]  #: [phone] Route: Oral  
  
We Performed the Following AMB POC EKG ROUTINE W/ 12 LEADS, INTER & REP [14046 CPT(R)] CBC WITH AUTOMATED DIFF [61130 CPT(R)] LIPID PANEL [85633 CPT(R)] METABOLIC PANEL, COMPREHENSIVE [59034 CPT(R)] REFERRAL TO GASTROENTEROLOGY [CSY10 Custom] UA/M W/RFLX CULTURE, ROUTINE [[id number] Custom] Follow-up Instructions Return in about 6 months (around 12/22/2018) for depression. Referral Information Referral ID Referred By Referred To  
  
 [id number], [address] [place] [address] Visits Status Start Date End Date 1 New Request 6/22/18 6/22/19 If your referral has a status of pending review or denied, additional information will be sent to support the outcome of this decision. Introducing [facility]! Dear Rolanda Babinski: 
Thank you for requesting a [employer] account. Our records indicate that you already have an active [employer] account. You can access your account anytime at [email or web address] Did you know that you can access your hospital and ER discharge instructions at any time in [employer]? You can also review all of your test results from your hospital stay or ER visit. Additional Information If you have questions, please visit the Frequently Asked Questions section of the [employer] website at [email or web address] Remember, [employer] is NOT to be used for urgent needs. For medical emergencies, dial 911. Now available from your iPhone and Android! Please provide this summary of care documentation to your next provider. Your primary care clinician is listed as Ara Turner.  If you have any questions after today's visit, please call [phone].

## 2018-06-27 LAB
ALBUMIN SERPL-MCNC: 4.2 G/DL (ref 3.6–4.8)
ALBUMIN/GLOB SERPL: 1.6 {RATIO} (ref 1.2–2.2)
ALP SERPL-CCNC: 57 IU/L (ref 39–117)
ALT SERPL-CCNC: 14 IU/L (ref 0–32)
APPEARANCE UR: CLEAR
AST SERPL-CCNC: 25 IU/L (ref 0–40)
BACTERIA #/AREA URNS HPF: NORMAL /[HPF]
BASOPHILS # BLD AUTO: 0 X10E3/UL (ref 0–0.2)
BASOPHILS NFR BLD AUTO: 1 %
BILIRUB SERPL-MCNC: 0.5 MG/DL (ref 0–1.2)
BILIRUB UR QL STRIP: NEGATIVE
BUN SERPL-MCNC: 16 MG/DL (ref 8–27)
BUN/CREAT SERPL: 23 (ref 12–28)
CALCIUM SERPL-MCNC: 9.2 MG/DL (ref 8.7–10.3)
CASTS URNS QL MICRO: NORMAL /LPF
CHLORIDE SERPL-SCNC: 100 MMOL/L (ref 96–106)
CHOLEST SERPL-MCNC: 200 MG/DL (ref 100–199)
CO2 SERPL-SCNC: 24 MMOL/L (ref 20–29)
COLOR UR: YELLOW
CREAT SERPL-MCNC: 0.71 MG/DL (ref 0.57–1)
EOSINOPHIL # BLD AUTO: 0.3 X10E3/UL (ref 0–0.4)
EOSINOPHIL NFR BLD AUTO: 7 %
EPI CELLS #/AREA URNS HPF: NORMAL /HPF
ERYTHROCYTE [DISTWIDTH] IN BLOOD BY AUTOMATED COUNT: 12.9 % (ref 12.3–15.4)
GLOBULIN SER CALC-MCNC: 2.6 G/DL (ref 1.5–4.5)
GLUCOSE SERPL-MCNC: 93 MG/DL (ref 65–99)
GLUCOSE UR QL: NEGATIVE
HCT VFR BLD AUTO: 36.6 % (ref 34–46.6)
HDLC SERPL-MCNC: 101 MG/DL
HGB BLD-MCNC: 11.9 G/DL (ref 11.1–15.9)
HGB UR QL STRIP: NEGATIVE
IMM GRANULOCYTES # BLD: 0 X10E3/UL (ref 0–0.1)
IMM GRANULOCYTES NFR BLD: 0 %
INTERPRETATION, 910389: NORMAL
KETONES UR QL STRIP: NEGATIVE
LDLC SERPL CALC-MCNC: 89 MG/DL (ref 0–99)
LEUKOCYTE ESTERASE UR QL STRIP: NEGATIVE
LYMPHOCYTES # BLD AUTO: 1.6 X10E3/UL (ref 0.7–3.1)
LYMPHOCYTES NFR BLD AUTO: 35 %
MCH RBC QN AUTO: 30.9 PG (ref 26.6–33)
MCHC RBC AUTO-ENTMCNC: 32.5 G/DL (ref 31.5–35.7)
MCV RBC AUTO: 95 FL (ref 79–97)
MICRO URNS: NORMAL
MICRO URNS: NORMAL
MONOCYTES # BLD AUTO: 0.3 X10E3/UL (ref 0.1–0.9)
MONOCYTES NFR BLD AUTO: 6 %
MUCOUS THREADS URNS QL MICRO: PRESENT
NEUTROPHILS # BLD AUTO: 2.4 X10E3/UL (ref 1.4–7)
NEUTROPHILS NFR BLD AUTO: 51 %
NITRITE UR QL STRIP: NEGATIVE
PH UR STRIP: 5.5 [PH] (ref 5–7.5)
PLATELET # BLD AUTO: 253 X10E3/UL (ref 150–379)
POTASSIUM SERPL-SCNC: 4.1 MMOL/L (ref 3.5–5.2)
PROT SERPL-MCNC: 6.8 G/DL (ref 6–8.5)
PROT UR QL STRIP: NEGATIVE
RBC # BLD AUTO: 3.85 X10E6/UL (ref 3.77–5.28)
RBC #/AREA URNS HPF: NORMAL /HPF
SODIUM SERPL-SCNC: 138 MMOL/L (ref 134–144)
SP GR UR: 1.03 (ref 1–1.03)
TRIGL SERPL-MCNC: 49 MG/DL (ref 0–149)
URINALYSIS REFLEX, 377202: NORMAL
UROBILINOGEN UR STRIP-MCNC: 0.2 MG/DL (ref 0.2–1)
VLDLC SERPL CALC-MCNC: 10 MG/DL (ref 5–40)
WBC # BLD AUTO: 4.7 X10E3/UL (ref 3.4–10.8)
WBC #/AREA URNS HPF: NORMAL /HPF

## 2018-07-30 DIAGNOSIS — G47.9 SLEEP DISORDER: ICD-10-CM

## 2018-07-30 DIAGNOSIS — F41.9 ANXIETY: ICD-10-CM

## 2018-08-01 RX ORDER — CLONAZEPAM 0.5 MG/1
TABLET ORAL
Qty: 45 TAB | Refills: 0 | OUTPATIENT
Start: 2018-08-01 | End: 2018-11-13 | Stop reason: SDUPTHER

## 2018-08-01 NOTE — TELEPHONE ENCOUNTER
Orders Placed This Encounter    clonazePAM (KLONOPIN) 0.5 mg tablet     Sig: TAKE 1 AND 1/2 TABLETS BY MOUTH EVERY NIGHT AT BEDTIME AS NEEDED.  MAX1 AND 1/2 TABLETS PER DAY     Dispense:  45 Tab     Refill:  0       reviewed 8/1/2018

## 2018-08-01 NOTE — TELEPHONE ENCOUNTER
MD Victor Hugo Liu LPN 20 minutes ago (5:86 PM)                Please call script in to pharmacy  (Routing comment)           Call to pharmacy listed, spoke w/ pharmacist & provided with Rx information and prescription called in successfully. Requested Prescriptions     Signed Prescriptions Disp Refills    clonazePAM (KLONOPIN) 0.5 mg tablet 45 Tab 0     Sig: TAKE 1 AND 1/2 TABLETS BY MOUTH EVERY NIGHT AT BEDTIME AS NEEDED.  MAX1 AND 1/2 TABLETS PER DAY     Authorizing Provider: Jalil Monreal

## 2018-10-05 NOTE — TELEPHONE ENCOUNTER
Requested Prescriptions     Pending Prescriptions Disp Refills    citalopram (CELEXA) 20 mg tablet 30 Tab 5     Sig: Take 1 Tab by mouth daily.      06/22/2018 12/05/2018  [facility]

## 2018-10-07 RX ORDER — CITALOPRAM 20 MG/1
20 TABLET, FILM COATED ORAL DAILY
Qty: 30 TAB | Refills: 3 | Status: SHIPPED | OUTPATIENT
Start: 2018-10-07 | End: 2018-11-05 | Stop reason: DRUGHIGH

## 2018-11-05 RX ORDER — CITALOPRAM 10 MG/1
15 TABLET ORAL DAILY
Qty: 45 TAB | Refills: 1 | Status: SHIPPED | OUTPATIENT
Start: 2018-11-05 | End: 2019-08-02

## 2018-11-05 RX ORDER — CITALOPRAM 20 MG/1
20 TABLET, FILM COATED ORAL DAILY
Qty: 30 TAB | Refills: 3 | Status: CANCELLED | OUTPATIENT
Start: 2018-11-05

## 2018-11-05 NOTE — TELEPHONE ENCOUNTER
Requested Prescriptions     Pending Prescriptions Disp Refills    citalopram (CELEXA) 20 mg tablet 30 Tab 3     Sig: Take 1 Tab by mouth daily. 1/2 tab daily for 7 days then increase to 1 whole tablet daily. Patient states that she has not started on the 20mg she thinks that 15 mg is enough. She would like a script for 10mg so that she can take 1 1/2 tabs by cutting them in half.   [facility]    06/22/2018 12/05/2018

## 2018-11-13 DIAGNOSIS — G47.9 SLEEP DISORDER: ICD-10-CM

## 2018-11-13 DIAGNOSIS — F41.9 ANXIETY: ICD-10-CM

## 2018-11-15 ENCOUNTER — TELEPHONE (OUTPATIENT)
Dept: INTERNAL MEDICINE CLINIC | Age: 63
End: 2018-11-15

## 2018-11-15 RX ORDER — PRAMIPEXOLE DIHYDROCHLORIDE 0.5 MG/1
TABLET ORAL
Qty: 180 TAB | Refills: 0 | Status: SHIPPED | OUTPATIENT
Start: 2018-11-15 | End: 2019-05-20 | Stop reason: SDUPTHER

## 2018-11-15 RX ORDER — CLONAZEPAM 0.5 MG/1
TABLET ORAL
Qty: 45 TAB | Refills: 0 | OUTPATIENT
Start: 2018-11-15 | End: 2018-12-29 | Stop reason: SDUPTHER

## 2018-11-15 NOTE — TELEPHONE ENCOUNTER
Jose A Goel, [address] [facility] Office   Phone Number: [phone]             Allyson Gross from [address] [place] [address] is calling to verify the dosage for  pt medication.

## 2018-11-15 NOTE — TELEPHONE ENCOUNTER
Cory Timmons MD   You 1 minute ago (3:00 PM)     Please call script in to pharmacy  (Routing comment)      Call to pharmacy listed, spoke w/ pharmacist line with Rx information and prescription called in successfully. Requested Prescriptions     Signed Prescriptions Disp Refills    clonazePAM (KLONOPIN) 0.5 mg tablet 45 Tab 0     Sig: TAKE 1 &1/2 TABLET BY MOUTH EVERY NIGHT AT BEDTIME AS NEEDED.  MAX DAILY AMOUNT 1 &1/2 TABLET     Authorizing Provider: Norman Seymour

## 2018-11-15 NOTE — TELEPHONE ENCOUNTER
Orders Placed This Encounter    clonazePAM (KLONOPIN) 0.5 mg tablet     Sig: TAKE 1 &1/2 TABLET BY MOUTH EVERY NIGHT AT BEDTIME AS NEEDED.  MAX DAILY AMOUNT 1 &1/2 TABLET     Dispense:  45 Tab     Refill:  0       reviewed 11/15/2018

## 2018-11-15 NOTE — TELEPHONE ENCOUNTER
Spoke with Anurag Holloway, clarifying tablet amount daily for Klonopin: clonazePAM (KLONOPIN) 0.5 mg tablet [[id number]]   Order Details   Dose, Route, Frequency: As Directed    Dispense Quantity: 45 Tab Refills: 0 Fills remaining: --           Sig: TAKE 1 &1/2 TABLET BY MOUTH EVERY NIGHT AT BEDTIME AS NEEDED. MAX DAILY AMOUNT 1 &1/2 TABLET          He verbalizes understanding.

## 2018-12-14 ENCOUNTER — OFFICE VISIT (OUTPATIENT)
Dept: INTERNAL MEDICINE CLINIC | Age: 63
End: 2018-12-14

## 2018-12-14 VITALS
OXYGEN SATURATION: 98 % | BODY MASS INDEX: 18.13 KG/M2 | SYSTOLIC BLOOD PRESSURE: 108 MMHG | TEMPERATURE: 98.2 F | DIASTOLIC BLOOD PRESSURE: 60 MMHG | HEART RATE: 71 BPM | RESPIRATION RATE: 16 BRPM | HEIGHT: 64 IN | WEIGHT: 106.2 LBS

## 2018-12-14 DIAGNOSIS — Z79.899 ENCOUNTER FOR LONG-TERM (CURRENT) USE OF MEDICATIONS: ICD-10-CM

## 2018-12-14 DIAGNOSIS — F32.A DEPRESSION, UNSPECIFIED DEPRESSION TYPE: Primary | ICD-10-CM

## 2018-12-14 DIAGNOSIS — G25.81 RESTLESS LEG: ICD-10-CM

## 2018-12-14 NOTE — PROGRESS NOTES
Subjective:      Silvina Taylor is a 58 y.o. female who presents today for follow up of her  Anxiety and restless legs. Taking celexa 10mg daily. Feels quite well. Her depression is controlled with this dose. Patient Active Problem List   Diagnosis Code    Restless legs syndrome G25.81    Sleep disorder G47.9    Osteopenia M85.80    Celiac disease K90.0    Iron deficiency E61.1    Pap smear for cervical cancer screening Z12.4    History of screening mammography Z92.89    History of bone density study Z92.89    Colon cancer screening Z12.11    Herpetic lesion B00.9    Lumbar disc disease with radiculopathy M51.16    Depression with anxiety F41.8    Advance directive discussed with patient Z71.89          Review of Systems    Pertinent items are noted in HPI. Objective:      Wt Readings from Last 3 Encounters:   12/14/18 106 lb 3.2 oz (48.2 kg)   06/22/18 106 lb (48.1 kg)   06/18/18 104 lb 9.6 oz (47.4 kg)     Temp Readings from Last 3 Encounters:   12/14/18 98.2 °F (36.8 °C) (Oral)   06/22/18 99.7 °F (37.6 °C) (Oral)   06/18/18 97.7 °F (36.5 °C) (Oral)     BP Readings from Last 3 Encounters:   12/14/18 108/60   06/22/18 100/62   06/18/18 102/68     Pulse Readings from Last 3 Encounters:   12/14/18 71   06/22/18 78   06/18/18 75       Visit Vitals  /60 (BP 1 Location: Left arm, BP Patient Position: Sitting)   Pulse 71   Temp 98.2 °F (36.8 °C) (Oral)   Resp 16   Ht 5' 3.5\" (1.613 m)   Wt 106 lb 3.2 oz (48.2 kg)   SpO2 98%   BMI 18.52 kg/m²     General appearance: alert, cooperative, no distress, appears stated age  Head: Normocephalic, without obvious abnormality, atraumatic  Neck: supple, symmetrical, trachea midline, no adenopathy, no carotid bruit and no JVD  Lungs: clear to auscultation bilaterally  Heart: regular rate and rhythm, S1, S2 normal, no murmur, click, rub or gallop  Neurologic: Mental status: Alert, oriented, thought content appropriate, affect: stable and normal    Assessment/Plan:     1. Depression, unspecified depression type  -controlled with use of celexa 10mg daily. 2. Restless leg  -I evaluated and recommended to continue current doses of medications. 3. Encounter for long-term (current) use of medications    No orders of the defined types were placed in this encounter. Follow-up Disposition:     Follow up in 6 months     Return if symptoms worsen or fail to improve. Advised patient to call back or return to office if symptoms worsen/change/persist.     Discussed expected course/resolution/complications of diagnosis in detail with patient. Medication risks/benefits/costs/interactions/alternatives discussed with patient. Patient was given an after visit summary which includes diagnoses, current medications, & vitals. Patient expressed understanding with the diagnosis and plan.

## 2018-12-14 NOTE — PROGRESS NOTES
Raji Crum is a 58 y.o. female    Chief Complaint   Patient presents with    Anxiety     6 month f/u    Sleep Problem     6 month f/u     1. Have you been to the ER, urgent care clinic since your last visit? Hospitalized since your last visit? No    2. Have you seen or consulted any other health care providers outside of the [address] [place] [address] since your last visit? Include any pap smears or colon screening. No     Visit Vitals  /60 (BP 1 Location: Left arm, BP Patient Position: Sitting)   Pulse 71   Temp 98.2 °F (36.8 °C) (Oral)   Resp 16   Ht 5' 3.5\" (1.613 m)   Wt 106 lb 3.2 oz (48.2 kg)   SpO2 98%   BMI 18.52 kg/m²     Health Maintenance Due   Topic Date Due    Shingrix Vaccine Age 50> (1 of 2) 03/17/2004    PAP AKA CERVICAL CYTOLOGY  07/01/2018    BREAST CANCER SCRN MAMMOGRAM  07/07/2018    Influenza Age 9 to Adult  08/01/2018      declines influenza vaccine. Declines shingles vaccine.     Renelda Primrose, LPN

## 2018-12-17 PROBLEM — F32.A MILD DEPRESSION: Status: ACTIVE | Noted: 2018-12-17

## 2018-12-29 DIAGNOSIS — G47.9 SLEEP DISORDER: ICD-10-CM

## 2018-12-29 DIAGNOSIS — F41.9 ANXIETY: ICD-10-CM

## 2018-12-31 ENCOUNTER — OFFICE VISIT (OUTPATIENT)
Dept: INTERNAL MEDICINE CLINIC | Age: 63
End: 2018-12-31

## 2018-12-31 VITALS
TEMPERATURE: 110.6 F | DIASTOLIC BLOOD PRESSURE: 54 MMHG | OXYGEN SATURATION: 96 % | RESPIRATION RATE: 16 BRPM | WEIGHT: 106.6 LBS | HEART RATE: 74 BPM | SYSTOLIC BLOOD PRESSURE: 100 MMHG | HEIGHT: 64 IN | BODY MASS INDEX: 18.2 KG/M2

## 2018-12-31 DIAGNOSIS — M79.10 MYALGIA: ICD-10-CM

## 2018-12-31 DIAGNOSIS — R05.9 COUGH: ICD-10-CM

## 2018-12-31 DIAGNOSIS — R50.9 FEVER, UNSPECIFIED FEVER CAUSE: ICD-10-CM

## 2018-12-31 DIAGNOSIS — J06.9 URI, ACUTE: Primary | ICD-10-CM

## 2018-12-31 LAB
FLUAV+FLUBV AG NOSE QL IA.RAPID: NEGATIVE POS/NEG
FLUAV+FLUBV AG NOSE QL IA.RAPID: NEGATIVE POS/NEG
VALID INTERNAL CONTROL?: YES

## 2018-12-31 RX ORDER — AZITHROMYCIN 250 MG/1
TABLET, FILM COATED ORAL
Qty: 6 TAB | Refills: 0 | Status: SHIPPED | OUTPATIENT
Start: 2018-12-31 | End: 2019-06-04

## 2018-12-31 RX ORDER — CLONAZEPAM 0.5 MG/1
TABLET ORAL
Qty: 45 TAB | Refills: 0 | OUTPATIENT
Start: 2018-12-31 | End: 2019-02-11 | Stop reason: SDUPTHER

## 2018-12-31 NOTE — PROGRESS NOTES
Yong Foreman is a 58 y.o. female    Chief Complaint   Patient presents with    Cough     x 1 week. drainage into back of throat, sneezing, runny nose.  Sinus Pain     x 2 days     1. Have you been to the ER, urgent care clinic since your last visit? Hospitalized since your last visit? No    2. Have you seen or consulted any other health care providers outside of the [address] [place] [address] since your last visit? Include any pap smears or colon screening.  No     Visit Vitals  /54 (BP 1 Location: Left arm, BP Patient Position: Sitting)   Pulse 74   Temp (!) 110.6 °F (43.7 °C) (Oral)   Resp 16   Ht 5' 3.5\" (1.613 m)   Wt 106 lb 9.6 oz (48.4 kg)   SpO2 96%   BMI 18.59 kg/m²     Health Maintenance Due   Topic Date Due    Shingrix Vaccine Age 49> (1 of 2) 03/17/2004    PAP AKA CERVICAL CYTOLOGY  07/01/2018    BREAST CANCER SCRN MAMMOGRAM  07/07/2018    DTaP/Tdap/Td series (2 - Td) 12/17/2018     Jagdish Kirk LPN

## 2018-12-31 NOTE — TELEPHONE ENCOUNTER
Orders Placed This Encounter    clonazePAM (KLONOPIN) 0.5 mg tablet     Sig: TAKE 1 &1/2 TABLET BY MOUTH EVERY NIGHT AT BEDTIME AS NEEDED.  MAX DAILY AMOUNT 1 &1/2 TABLET     Dispense:  45 Tab     Refill:  0       reviewed 12/31/2018

## 2018-12-31 NOTE — TELEPHONE ENCOUNTER
Rx called into local pharmacy pharmacist on file. Requested Prescriptions     Signed Prescriptions Disp Refills    clonazePAM (KLONOPIN) 0.5 mg tablet 45 Tab 0     Sig: TAKE 1 &1/2 TABLET BY MOUTH EVERY NIGHT AT BEDTIME AS NEEDED.  MAX DAILY AMOUNT 1 &1/2 TABLET     Authorizing Provider: Barbara Jackson

## 2018-12-31 NOTE — PROGRESS NOTES
Subjective:      Criss Mendez is a 58 y.o. female who presents today     Started mckenzie karen,  Fatigue, slight cough. The cough has progressively gotten worse. She has chest congestion. Has taken some mucinex, chinese herbs, ibuprofen. Saw her acupuncturist on Monday. Fever -low grade. Patient Active Problem List   Diagnosis Code    Restless legs syndrome G25.81    Sleep disorder G47.9    Osteopenia M85.80    Celiac disease K90.0    Iron deficiency E61.1    Pap smear for cervical cancer screening Z12.4    History of screening mammography Z92.89    History of bone density study Z92.89    Colon cancer screening Z12.11    Herpetic lesion B00.9    Lumbar disc disease with radiculopathy M51.16    Depression with anxiety F41.8    Advance directive discussed with patient Z71.89    Mild depression ([address] F32.0          Review of Systems    Pertinent items are noted in HPI. Objective:     Visit Vitals  /54 (BP 1 Location: Left arm, BP Patient Position: Sitting)   Pulse 74   Temp (!) 110.6 °F (43.7 °C) (Oral)   Resp 16   Ht 5' 3.5\" (1.613 m)   Wt 106 lb 9.6 oz (48.4 kg)   SpO2 96%   BMI 18.59 kg/m²     General appearance: alert, cooperative, no distress, appears stated age  Head: Normocephalic, without obvious abnormality, atraumatic  Ears: normal TM's and external ear canals AU  Nose: Nares normal. Septum midline. Mucosa dry. No drainage or sinus tenderness.   Throat: Lips, mucosa, and tongue normal. Teeth and gums normal and normal findings: oropharynx pink & moist without lesions or evidence of thrush  Neck: supple, symmetrical, trachea midline, no adenopathy, no carotid bruit and no JVD  Lungs: clear to auscultation bilaterally    Results for orders placed or performed in visit on 12/31/18   AMB POC WILLIAM INFLUENZA A/B TEST   Result Value Ref Range    VALID INTERNAL CONTROL POC Yes     Influenza A Ag POC Negative Negative Pos/Neg    Influenza B Ag POC Negative Negative Pos/Neg Assessment/Plan:       ICD-10-CM ICD-9-CM    1. URI, acute J06.9 465.9    2. Cough R05 786.2 AMB POC WILLIAM INFLUENZA A/B TEST   3. Fever, unspecified fever cause R50.9 780.60 AMB POC WILLIAM INFLUENZA A/B TEST   4. Myalgia M79.10 729.1 AMB POC WILLIAM INFLUENZA A/B TEST      Plan:  -rest , fluids  -zpack  -delsym for cough    Orders Placed This Encounter    AMB POC WILLIAM INFLUENZA A/B TEST    azithromycin (ZITHROMAX) 250 mg tablet     Sig: Take two tablets today then one tablet daily     Dispense:  6 Tab     Refill:  0        Follow-up Disposition:     Return if symptoms worsen or fail to improve. Advised patient to call back or return to office if symptoms worsen/change/persist.     Discussed expected course/resolution/complications of diagnosis in detail with patient. Medication risks/benefits/costs/interactions/alternatives discussed with patient. Patient was given an after visit summary which includes diagnoses, current medications, & vitals. Patient expressed understanding with the diagnosis and plan.

## 2019-02-11 DIAGNOSIS — G47.9 SLEEP DISORDER: ICD-10-CM

## 2019-02-11 DIAGNOSIS — F41.9 ANXIETY: ICD-10-CM

## 2019-02-11 RX ORDER — CLONAZEPAM 0.5 MG/1
TABLET ORAL
Qty: 45 TAB | Refills: 0 | OUTPATIENT
Start: 2019-02-11 | End: 2019-04-02 | Stop reason: SDUPTHER

## 2019-02-11 NOTE — TELEPHONE ENCOUNTER
Earley Leavens, MD Jyl Felty, LPN 44 minutes ago (1:66 PM)     Please call script in to pharmacy  (Routing comment)      Call to pharmacy listed, spoke w/ pharmacist with Rx information and prescription called in successfully.   Requested Prescriptions     Signed Prescriptions Disp Refills    clonazePAM (KLONOPIN) 0.5 mg tablet 45 Tab 0     Sig: TAKE 1 & 1/2 TABLET BY MOUTH EVERY NIGHT AT BEDTIME AS NEEDED     Authorizing Provider: Monisha Beckwith

## 2019-02-11 NOTE — TELEPHONE ENCOUNTER
Orders Placed This Encounter    clonazePAM (KLONOPIN) 0.5 mg tablet     Sig: TAKE 1 & 1/2 TABLET BY MOUTH EVERY NIGHT AT BEDTIME AS NEEDED     Dispense:  45 Tab     Refill:  0       reviewed 2/11/2019

## 2019-04-02 DIAGNOSIS — G47.9 SLEEP DISORDER: ICD-10-CM

## 2019-04-02 DIAGNOSIS — F41.9 ANXIETY: ICD-10-CM

## 2019-04-03 RX ORDER — CLONAZEPAM 0.5 MG/1
TABLET ORAL
Qty: 45 TAB | Refills: 0 | OUTPATIENT
Start: 2019-04-03 | End: 2019-05-13 | Stop reason: SDUPTHER

## 2019-04-03 NOTE — TELEPHONE ENCOUNTER
Call to pharmacy listed, spoke with pharmacist with Rx information and prescription called in successfully.   Requested Prescriptions     Signed Prescriptions Disp Refills    clonazePAM (KLONOPIN) 0.5 mg tablet 45 Tab 0     Sig: TAKE 1 & 1/2 TABLETS BY MOUTH EVERY NIGHT AT BEDTIME AS NEEDED     Authorizing Provider: Koko Taveras

## 2019-05-13 DIAGNOSIS — G47.9 SLEEP DISORDER: ICD-10-CM

## 2019-05-13 DIAGNOSIS — F41.9 ANXIETY: ICD-10-CM

## 2019-05-13 RX ORDER — CLONAZEPAM 0.5 MG/1
TABLET ORAL
Qty: 45 TAB | Refills: 0 | OUTPATIENT
Start: 2019-05-13 | End: 2019-06-24 | Stop reason: SDUPTHER

## 2019-05-13 NOTE — TELEPHONE ENCOUNTER
Orders Placed This Encounter    clonazePAM (KLONOPIN) 0.5 mg tablet     Sig: TAKE 1 AND 1/2 TABLET BY MOUTH EVERY NIGHT AT BEDTIME AS NEEDED     Dispense:  45 Tab     Refill:  0       reviewed 5/13/2019

## 2019-05-13 NOTE — TELEPHONE ENCOUNTER
Rx verbal called into Rohit Lopez at [facility].      Requested Prescriptions     Signed Prescriptions Disp Refills    clonazePAM (KLONOPIN) 0.5 mg tablet 45 Tab 0     Sig: TAKE 1 AND 1/2 TABLET BY MOUTH EVERY NIGHT AT BEDTIME AS NEEDED     Authorizing Provider: Jesus Burton

## 2019-05-17 ENCOUNTER — HOSPITAL ENCOUNTER (EMERGENCY)
Age: 64
Discharge: HOME | End: 2019-05-17
Attending: EMERGENCY MEDICINE
Payer: MEDICARE

## 2019-05-17 ENCOUNTER — APPOINTMENT (OUTPATIENT)
Dept: GENERAL RADIOLOGY | Age: 64
End: 2019-05-17
Attending: EMERGENCY MEDICINE
Payer: MEDICARE

## 2019-05-17 VITALS
SYSTOLIC BLOOD PRESSURE: 90 MMHG | RESPIRATION RATE: 18 BRPM | HEART RATE: 77 BPM | DIASTOLIC BLOOD PRESSURE: 57 MMHG | OXYGEN SATURATION: 97 % | TEMPERATURE: 98.8 F

## 2019-05-17 DIAGNOSIS — M25.571 ACUTE RIGHT ANKLE PAIN: Primary | ICD-10-CM

## 2019-05-17 LAB
ANION GAP BLD CALC-SCNC: 16 MMOL/L (ref 10–20)
BUN BLD-MCNC: 26 MG/DL (ref 9–20)
CA-I BLD-MCNC: 1.17 MMOL/L (ref 1.12–1.32)
CHLORIDE BLD-SCNC: 100 MMOL/L (ref 98–107)
CO2 BLD-SCNC: 27 MMOL/L (ref 21–32)
CREAT BLD-MCNC: 0.7 MG/DL (ref 0.6–1.3)
D DIMER PPP FEU-MCNC: 0.26 MG/L FEU (ref 0–0.65)
GLUCOSE BLD-MCNC: 143 MG/DL (ref 65–100)
HCT VFR BLD CALC: 33 % (ref 35–47)
POTASSIUM BLD-SCNC: 3.7 MMOL/L (ref 3.5–5.1)
SERVICE CMNT-IMP: ABNORMAL
SODIUM BLD-SCNC: 138 MMOL/L (ref 136–145)

## 2019-05-17 PROCEDURE — 36415 COLL VENOUS BLD VENIPUNCTURE: CPT

## 2019-05-17 PROCEDURE — 99283 EMERGENCY DEPT VISIT LOW MDM: CPT

## 2019-05-17 PROCEDURE — 85379 FIBRIN DEGRADATION QUANT: CPT

## 2019-05-17 PROCEDURE — 80047 BASIC METABLC PNL IONIZED CA: CPT

## 2019-05-17 PROCEDURE — 73610 X-RAY EXAM OF ANKLE: CPT

## 2019-05-17 NOTE — DISCHARGE INSTRUCTIONS
Patient Education        Musculoskeletal Pain: Care Instructions  Your Care Instructions    Different problems with the bones, muscles, nerves, ligaments, and tendons in the body can cause pain. One or more areas of your body may ache or burn. Or they may feel tired, stiff, or sore. The medical term for this type of pain is musculoskeletal pain. It can have many different causes. Sometimes the pain is caused by an injury such as a strain or sprain. Or you might have pain from using one part of your body in the same way over and over again. This is called overuse. In some cases, the cause of the pain is another health problem such as arthritis or fibromyalgia. The doctor will examine you and ask you questions about your health to help find the cause of your pain. Blood tests or imaging tests like an X-ray may also be helpful. But sometimes doctors can't find a cause of the pain. Treatment depends on your symptoms and the cause of the pain, if known. The doctor has checked you carefully, but problems can develop later. If you notice any problems or new symptoms, get medical treatment right away. Follow-up care is a key part of your treatment and safety. Be sure to make and go to all appointments, and call your doctor if you are having problems. It's also a good idea to know your test results and keep a list of the medicines you take. How can you care for yourself at home? · Rest until you feel better. · Do not do anything that makes the pain worse. Return to exercise gradually if you feel better and your doctor says it's okay. · Be safe with medicines. Read and follow all instructions on the label. ? If the doctor gave you a prescription medicine for pain, take it as prescribed. ? If you are not taking a prescription pain medicine, ask your doctor if you can take an over-the-counter medicine. · Put ice or a cold pack on the area for 10 to 20 minutes at a time to ease pain.  Put a thin cloth between the ice and your skin. When should you call for help? Call your doctor now or seek immediate medical care if:    · You have new pain, or your pain gets worse.     · You have new symptoms such as a fever, a rash, or chills.    Watch closely for changes in your health, and be sure to contact your doctor if:    · You do not get better as expected. Where can you learn more? Go to http://tim-lizeth.info/. Enter C051 in the search box to learn more about \"Musculoskeletal Pain: Care Instructions. \"  Current as of: Sweta 3, 2018  Content Version: 11.9  © 0286-9351 [employer]. Care instructions adapted under license by [employer] (which disclaims liability or warranty for this information). If you have questions about a medical condition or this instruction, always ask your healthcare professional. [address] any warranty or liability for your use of this information. Patient Education        Joint Pain: Care Instructions  Your Care Instructions    Many people have small aches and pains from overuse or injury to muscles and joints. Joint injuries often happen during sports or recreation, work tasks, or projects around the home. An overuse injury can happen when you put too much stress on a joint or when you do an activity that stresses the joint over and over, such as using the computer or rowing a boat. You can take action at home to help your muscles and joints get better. You should feel better in 1 to 2 weeks, but it can take 3 months or more to heal completely. Follow-up care is a key part of your treatment and safety. Be sure to make and go to all appointments, and call your doctor if you are having problems. It's also a good idea to know your test results and keep a list of the medicines you take. How can you care for yourself at home? · Do not put weight on the injured joint for at least a day or two.   · For the first day or two after an injury, do not take hot showers or baths, and do not use hot packs. The heat could make swelling worse. · Put ice or a cold pack on the sore joint for 10 to 20 minutes at a time. Try to do this every 1 to 2 hours for the next 3 days (when you are awake) or until the swelling goes down. Put a thin cloth between the ice and your skin. · Wrap the injury in an elastic bandage. Do not wrap it too tightly because this can cause more swelling. · Prop up the sore joint on a pillow when you ice it or anytime you sit or lie down during the next 3 days. Try to keep it above the level of your heart. This will help reduce swelling. · Take an over-the-counter pain medicine, such as acetaminophen (Tylenol), ibuprofen (Advil, Motrin), or naproxen (Aleve). Read and follow all instructions on the label. · After 1 or 2 days of rest, begin moving the joint gently. While the joint is still healing, you can begin to exercise using activities that do not strain or hurt the painful joint. When should you call for help? Call your doctor now or seek immediate medical care if:    · You have signs of infection, such as:  ? Increased pain, swelling, warmth, and redness. ? Red streaks leading from the joint. ? A fever.    Watch closely for changes in your health, and be sure to contact your doctor if:    · Your movement or symptoms are not getting better after 1 to 2 weeks of home treatment. Where can you learn more? Go to http://tim-lizeth.info/. Enter P205 in the search box to learn more about \"Joint Pain: Care Instructions. \"  Current as of: September 20, 2018  Content Version: 11.9  © 5631-1738 [employer]. Care instructions adapted under license by [employer] (which disclaims liability or warranty for this information).  If you have questions about a medical condition or this instruction, always ask your healthcare professional. [employer] disclaims any warranty or liability for your use of this information.

## 2019-05-17 NOTE — ED TRIAGE NOTES
patient arrives to the ED with c/o pain to the RIGHT leg, starting from the ankle going upward toward the knee, which started @ 2-3 days ago, patient states no injury, no deformity, no swelling noted, full ROM noted.

## 2019-05-17 NOTE — ED PROVIDER NOTES
71 y.o. female with past medical history significant for Arthritis, restless legs who presents from home with chief complaint of right leg pain. Pt states about 2-3 days ago she had the onset of pain located in her right lateral ankle that radiates laterally up to her right knee. She denies any swelling to the area, or recent injuries to contribute to the onset of the pain. She reports taking Naprosyn and 50 mg Tramadol at 2130 last night without relief. She then notes the onset of nausea after taking the medications. Pt further states she was concerned for a blood clot prompting her visit to the Ed. She denies any history of blood clots, but has a history of a baker's cyst behind the right knee. Pt specifically denies any fever, chills, headache, cough, congestion, shortness of breath, chest pain, abdominal pain, vomiting, diarrhea, dysuria, or urinary frequency. There are no other acute medical concerns at this time. PSHx: Significant for  Section, endoscopy, R knee menisectomy Social Hx: negative tobacco use, occasional EtOH use, negative Illicit Drug use PCP: Jasmeet Montilla MD 
 
Note written by Nubia Jackson, as dictated by Juan M West MD 1:05 AM 
 
The history is provided by the patient. No  was used. Past Medical History:  
Diagnosis Date  Arthritis Left great toe, R knee, L thumb  Celiac syndrome   
 by blood testing  Iron deficiency  Restless legs Past Surgical History:  
Procedure Laterality Date  ENDOSCOPY, COLON, DIAGNOSTIC   &   
 benign sessile polyp in 2013 - repeat in 5 years [address]  
 lap for blocked fallopian tube [address]    
 HX ORTHOPAEDIC  8412 R knee menisectomy Family History:  
Problem Relation Age of Onset  Kidney Disease Mother  Hypertension Mother  Heart Failure Mother  Stroke Father  Arthritis-osteo Father  Heart Failure Father  Thyroid Disease Sister  Arthritis-osteo Paternal Grandmother Social History Socioeconomic History  Marital status:  Spouse name: Not on file  Number of children: Not on file  Years of education: Not on file  Highest education level: Not on file Occupational History  Not on file Social Needs  Financial resource strain: Not on file  Food insecurity:  
  Worry: Not on file Inability: Not on file  Transportation needs:  
  Medical: Not on file Non-medical: Not on file Tobacco Use  Smoking status: Never Smoker  Smokeless tobacco: Never Used Substance and Sexual Activity  Alcohol use: Yes Alcohol/week: 1.5 - 2.0 oz Types: 3 - 4 Glasses of wine per week Comment: Rarely  Drug use: No  
  Types: Prescription, OTC Comment: No illegal drug use  Sexual activity: Not Currently Partners: Male Lifestyle  Physical activity:  
  Days per week: Not on file Minutes per session: Not on file  Stress: Not on file Relationships  Social connections:  
  Talks on phone: Not on file Gets together: Not on file Attends Confucianist service: Not on file Active member of club or organization: Not on file Attends meetings of clubs or organizations: Not on file Relationship status: Not on file  Intimate partner violence:  
  Fear of current or ex partner: Not on file Emotionally abused: Not on file Physically abused: Not on file Forced sexual activity: Not on file Other Topics Concern  Not on file Social History Narrative  Not on file ALLERGIES: Ambien cr [zolpidem] and Requip [ropinirole] Review of Systems Constitutional: Negative for chills and fever. HENT: Negative for congestion and sore throat. Eyes: Negative for pain. Respiratory: Negative for shortness of breath. Cardiovascular: Negative for chest pain. Gastrointestinal: Positive for nausea. Negative for abdominal pain, diarrhea and vomiting. Genitourinary: Negative for dysuria and flank pain. Musculoskeletal: Positive for myalgias (right ankle and leg pain). Negative for back pain and neck pain. Skin: Negative for rash. Neurological: Negative for dizziness and headaches. Vitals:  
 05/17/19 0047 05/17/19 5964 BP: 109/42 90/57 Pulse: 77 Resp: 18 Temp: 98.8 °F (37.1 °C) SpO2: 97% Physical Exam  
Constitutional: She is oriented to person, place, and time. She appears well-developed and well-nourished. HENT:  
Head: Normocephalic. Mouth/Throat: Oropharynx is clear and moist.  
Eyes: Conjunctivae are normal.  
Neck: Normal range of motion. Neck supple. Cardiovascular: Normal rate and regular rhythm. Pulmonary/Chest: Effort normal and breath sounds normal. No respiratory distress. Abdominal: Soft. Bowel sounds are normal. There is no tenderness. Musculoskeletal: Normal range of motion. NVI, no significant swelling noted. Mild posterior knee swelling with history of bakers cyst.    
Neurological: She is alert and oriented to person, place, and time. No gross motor or sensory deficits Skin: Skin is warm. Capillary refill takes less than 2 seconds. No rash noted. Note written by Nubia Nunes, as dictated by Sohan Brito MD 1:05 AM 
Recent Results (from the past 24 hour(s)) D DIMER Collection Time: 05/17/19  1:22 AM  
Result Value Ref Range D-dimer 0.26 0.00 - 0.65 mg/L [facility]  
POC CHEM8 Collection Time: 05/17/19  1:22 AM  
Result Value Ref Range Calcium, ionized (POC) 1.17 1.12 - 1.32 mmol/L Sodium (POC) 138 136 - 145 mmol/L Potassium (POC) 3.7 3.5 - 5.1 mmol/L Chloride (POC) 100 98 - 107 mmol/L  
 CO2 (POC) 27 21 - 32 mmol/L Anion gap (POC) 16 10 - 20 mmol/L Glucose (POC) 143 (H) 65 - 100 mg/dL BUN (POC) 26 (H) 9 - 20 mg/dL Creatinine (POC) 0.7 0.6 - 1.3 mg/dL GFRAA, POC >60 >60 ml/min/1.73m2 GFRNA, POC >60 >60 ml/min/1.73m2 Hematocrit (POC) 33 (L) 35.0 - 47.0 % Comment Comment Not Indicated. Xr Ankle Rt Min 3 V Result Date: 5/17/2019 EXAM: XR ANKLE RT MIN 3 V INDICATION: ankle pain. Right ankle pain COMPARISON: None. FINDINGS: Three views of the right ankle demonstrate no fracture or disruption of the ankle mortise. There is no other acute osseous or articular abnormality. The soft tissues are within normal limits. IMPRESSION: No acute abnormality. MDM Number of Diagnoses or Management Options Acute right ankle pain:  
Diagnosis management comments: Right ankle pain with no reported trauma. No significant swelling. Neurovascular intact. D-dimer negative. X-ray negative. Advised treatment with Tylenol for pain and follow primary care provider. Amount and/or Complexity of Data Reviewed Decide to obtain previous medical records or to obtain history from someone other than the patient: yes ED Course as of May 17 0351 Fri May 17, 2019  
0156 D-dimer: 0.26 [ZD] ED Course User Index [ZD] Aries Walker MD  
 
 
Procedures 2:01 AM 
Patient's results have been reviewed with them. Patient and/or family have verbally conveyed their understanding and agreement of the patient's signs, symptoms, diagnosis, treatment and prognosis and additionally agree to follow up as recommended or return to the Emergency Room should their condition change prior to follow-up. Discharge instructions have also been provided to the patient with some educational information regarding their diagnosis as well a list of reasons why they would want to return to the ER prior to their follow-up appointment should their condition change.

## 2019-05-20 RX ORDER — PRAMIPEXOLE DIHYDROCHLORIDE 0.5 MG/1
TABLET ORAL
Qty: 180 TAB | Refills: 0 | Status: SHIPPED | OUTPATIENT
Start: 2019-05-20 | End: 2019-10-05 | Stop reason: SDUPTHER

## 2019-06-04 ENCOUNTER — OFFICE VISIT (OUTPATIENT)
Dept: INTERNAL MEDICINE CLINIC | Age: 64
End: 2019-06-04

## 2019-06-04 VITALS
HEIGHT: 64 IN | BODY MASS INDEX: 17.93 KG/M2 | SYSTOLIC BLOOD PRESSURE: 110 MMHG | OXYGEN SATURATION: 98 % | DIASTOLIC BLOOD PRESSURE: 76 MMHG | HEART RATE: 55 BPM | TEMPERATURE: 97.8 F | WEIGHT: 105 LBS | RESPIRATION RATE: 16 BRPM

## 2019-06-04 DIAGNOSIS — R42 LIGHT HEADEDNESS: Primary | ICD-10-CM

## 2019-06-04 NOTE — PROGRESS NOTES
70 yo female seen in ED on 5/17 for R ankle pain and pain down lateral aspect of RLE from knee to ankle which ended up being thought related to her knee. DVT was not felt to be an issue with normal D-dimer. She had taken a Tramadol (only had 3 pills \"left over\" from 5 years ago), and felt light-headed on arriving there. Her BP was a little odd w/ diastolic of 42. PE: Thin WF   BP sitting 108/64 and [id number]/64   Heart - RRR    Imp: Light-headedness   Low normal blood pressure    Plan: Discussed importance of good hydration and proper diet with avoidance of low blood sugar  ___________________________  Expected course of current diagnosed problem(s) as well as expected progression and possible complications, and desired follow up with provider are discussed with patient. Patient is encouraged to be back in touch with any questions or concerns. Patient expresses understanding of plan of care. Patient is given AVS which includes diagnoses, current medications, vitals.

## 2019-06-17 NOTE — PROGRESS NOTES
Subjective:      Brennan Humphrey is a 71 y.o. female who presents today for follow up of her depression and restless legs. Need welcome to medicare exam. - started medicare in march, 2019. Need shingrix  Need prevnar 13  Due for TD booster    Mammogram scheduled for tomorrow     Having significant trouble with sleeping. Getting about 4 hours of sleep. She falls asleep ok, but she will wake up in the middle of the night and cannot get back to sleep. She is irritable due to this and it has contributed to a recent break up. She is taking clonazepam at bedtime. Patient Active Problem List   Diagnosis Code    Restless legs syndrome G25.81    Sleep disorder G47.9    Osteopenia M85.80    Celiac disease K90.0    Iron deficiency E61.1    Pap smear for cervical cancer screening Z12.4    History of screening mammography Z92.89    History of bone density study Z92.89    Colon cancer screening Z12.11    Herpetic lesion B00.9    Lumbar disc disease with radiculopathy M51.16    Depression with anxiety F41.8    Advance directive discussed with patient Z71.89    Mild depression (HCC) F32.0     Current Outpatient Medications   Medication Sig Dispense Refill    OTHER Indications: Sleep support melatonin      zolpidem (AMBIEN) 5 mg tablet Take 1 Tab by mouth nightly as needed for Sleep. Max Daily Amount: 5 mg. 30 Tab 0    pneumococcal 13 rusty conj dip (PREVNAR-13) 0.5 mL syrg injection 0.5 mL by IntraMUSCular route once for 1 dose. 0.5 mL 0    varicella-zoster recombinant, PF, (SHINGRIX) 50 mcg/0.5 mL susr injection 0.5 mL by IntraMUSCular route once for 1 dose. Repeat in 2-6 months 0.5 mL 1    diph,pertuss,acel,,tetanus vac,PF, (ADACEL,TDAP ADOLESN/ADULT,,PF,) 2 Lf-(2.5-5-3-5 mcg)-5Lf/0.5 mL syrg vaccine 0.5 mL by IntraMUSCular route once for 1 dose.  0.5 mL 0    OTHER hemagenics iron supplement once daily      OTHER boswellia herbal complex twice daily      pramipexole (MIRAPEX) 0.5 mg tablet TAKE ONE TABLET 2 TIMES A DAY [facility]#[id number]) 180 Tab 0    clonazePAM (KLONOPIN) 0.5 mg tablet TAKE 1 AND 1/2 TABLET BY MOUTH EVERY NIGHT AT BEDTIME AS NEEDED 45 Tab 0    OTHER Chinese herbs      estradiol (VAGIFEM) 10 mcg tab vaginal tablet Insert 10 mcg into vagina every Tuesday and Friday.  estradiol 0.025 mg/24 hr ptsw Apply 1 Patch to affected area two (2) days a week.  progesterone (PROMETRIUM) 100 mg capsule Take 100 mg by mouth daily.  valACYclovir (VALTREX) 500 mg tablet Take 1 Tab by mouth two (2) times a day. For 1-3 days as needed. 30 Tab 3    citalopram (CELEXA) 10 mg tablet Take 1.5 Tabs by mouth daily. 45 Tab 1        Review of Systems    Pertinent items are noted in HPI. Objective:     Visit Vitals  /62 (BP 1 Location: Left arm, BP Patient Position: Sitting)   Pulse 86   Temp 98.7 °F (37.1 °C) (Oral)   Resp 15   Ht 5' 3.5\" (1.613 m)   Wt 105 lb (47.6 kg)   SpO2 98%   BMI 18.31 kg/m²     General appearance: alert, cooperative, no distress, appears stated age  Head: Normocephalic, without obvious abnormality, atraumatic  Neck: supple, symmetrical, trachea midline, no adenopathy, no carotid bruit and no JVD  Lungs: clear to auscultation bilaterally  Heart: regular rate and rhythm, S1, S2 normal, no murmur, click, rub or gallop  Neurologic: Mental status: affect: sad    Assessment/Plan:     1. Restless leg  -continue mirapex 0.5mg bid    2. Depression, unspecified depression type  -she doesn't want to restart celexa at this time. She uses this in the winter for her seasonal affective disorder. She thinks that if she just gets some sleep, her depression will improve. 3. Sleep disturbance  -d/c clonazepam at this time.   -trial of ambien 5mg nightly. - CBC WITH AUTOMATED DIFF  - zolpidem (AMBIEN) 5 mg tablet; Take 1 Tab by mouth nightly as needed for Sleep. Max Daily Amount: 5 mg. Dispense: 30 Tab; Refill: 0  - TSH 3RD GENERATION    4.  Colon cancer screening  -referred to Dr. Deepti Patel. - REFERRAL TO GASTROENTEROLOGY    5. Encounter for long-term (current) use of medications      6. Mixed hyperlipidemia    - CBC WITH AUTOMATED DIFF  - METABOLIC PANEL, COMPREHENSIVE  - LIPID PANEL  - UA/M W/RFLX CULTURE, ROUTINE    7. Iron deficiency  -check due to restless leg syndrome    - IRON PROFILE     Orders Placed This Encounter    CBC WITH AUTOMATED DIFF    METABOLIC PANEL, COMPREHENSIVE    LIPID PANEL    UA/M W/RFLX CULTURE, ROUTINE    IRON PROFILE    TSH 3RD GENERATION    [facility]     Referral Priority:   Routine     Referral Type:   Consultation     Referral Reason:   Specialty Services Required     Referral Location:   [facility]     Referred to Provider: Lotus Willis MD     Number of Visits Requested:   1    OTHER     Sig: Indications: Sleep support melatonin    zolpidem (AMBIEN) 5 mg tablet     Sig: Take 1 Tab by mouth nightly as needed for Sleep. Max Daily Amount: 5 mg. Dispense:  30 Tab     Refill:  0    pneumococcal 13 rusty conj dip (PREVNAR-13) 0.5 mL syrg injection     Si.5 mL by IntraMUSCular route once for 1 dose. Dispense:  0.5 mL     Refill:  0    varicella-zoster recombinant, PF, (SHINGRIX) 50 mcg/0.5 mL susr injection     Si.5 mL by IntraMUSCular route once for 1 dose. Repeat in 2-6 months     Dispense:  0.5 mL     Refill:  1    diph,pertuss,acel,,tetanus vac,PF, (ADACEL,TDAP ADOLESN/ADULT,,PF,) 2 Lf-(2.5-5-3-5 mcg)-5Lf/0.5 mL syrg vaccine     Si.5 mL by IntraMUSCular route once for 1 dose. Dispense:  0.5 mL     Refill:  0      Follow-up Disposition:     Follow up in 1 month and in 6 months for her Welcome to Medicare appointment    Return if symptoms worsen or fail to improve. Advised patient to call back or return to office if symptoms worsen/change/persist.     Discussed expected course/resolution/complications of diagnosis in detail with patient.    Medication risks/benefits/costs/interactions/alternatives discussed with patient. Patient was given an after visit summary which includes diagnoses, current medications, & vitals. Patient expressed understanding with the diagnosis and plan.

## 2019-06-19 ENCOUNTER — OFFICE VISIT (OUTPATIENT)
Dept: INTERNAL MEDICINE CLINIC | Age: 64
End: 2019-06-19

## 2019-06-19 VITALS
HEIGHT: 64 IN | HEART RATE: 86 BPM | RESPIRATION RATE: 15 BRPM | DIASTOLIC BLOOD PRESSURE: 62 MMHG | SYSTOLIC BLOOD PRESSURE: 114 MMHG | BODY MASS INDEX: 17.93 KG/M2 | TEMPERATURE: 98.7 F | WEIGHT: 105 LBS | OXYGEN SATURATION: 98 %

## 2019-06-19 DIAGNOSIS — Z12.11 COLON CANCER SCREENING: ICD-10-CM

## 2019-06-19 DIAGNOSIS — Z79.899 ENCOUNTER FOR LONG-TERM (CURRENT) USE OF MEDICATIONS: ICD-10-CM

## 2019-06-19 DIAGNOSIS — G47.9 SLEEP DISTURBANCE: ICD-10-CM

## 2019-06-19 DIAGNOSIS — G25.81 RESTLESS LEG: Primary | ICD-10-CM

## 2019-06-19 DIAGNOSIS — E78.2 MIXED HYPERLIPIDEMIA: ICD-10-CM

## 2019-06-19 DIAGNOSIS — E61.1 IRON DEFICIENCY: ICD-10-CM

## 2019-06-19 DIAGNOSIS — F33.8 SEASONAL AFFECTIVE DISORDER (HCC): ICD-10-CM

## 2019-06-19 RX ORDER — ZOLPIDEM TARTRATE 5 MG/1
5 TABLET ORAL
Qty: 30 TAB | Refills: 0 | Status: SHIPPED | OUTPATIENT
Start: 2019-06-19 | End: 2019-08-02

## 2019-06-19 NOTE — PROGRESS NOTES
Patient's identity verified with two patient identifiers (name and date of birth). Reviewed record in preparation for visit and have obtained necessary documentation. 1. Have you been to the ER, urgent care clinic since your last visit? Hospitalized since your last visit? No  2. Have you seen or consulted any other health care providers outside of the [address] [place] [address] since your last visit? Include any pap smears or colon screening. No    Chief Complaint   Patient presents with    Depression     6 month follow up, \"I'm not doing so well today but it is situational\".  Annual Wellness Visit     Medicare wellness due. Not fasting.     Health Maintenance Due   Topic Date Due    Shingrix Vaccine Age 49> (1 of 2)  Patient reports has not had. 03/17/2004    BREAST CANCER SCRN MAMMOGRAM   Due, aware, scheduled for tomorrow per pt, dx mammo for mass, [facility]) 07/07/2018    DTaP/Tdap/Td series (2 - Td)  Patient reports has not had. 12/17/2018    GLAUCOMA SCREENING Q2Y   Patient reports has not had. 03/17/2019    Pneumococcal 65+ years (1 of 2 - PCV13)  Patient reports has not had. 03/17/2019    MEDICARE YEARLY EXAM   due 05/17/2019       Wt Readings from Last 3 Encounters:   06/19/19 105 lb (47.6 kg)   06/04/19 105 lb (47.6 kg)   12/31/18 106 lb 9.6 oz (48.4 kg)     Temp Readings from Last 3 Encounters:   06/19/19 98.7 °F (37.1 °C) (Oral)   06/04/19 97.8 °F (36.6 °C) (Oral)   05/17/19 98.8 °F (37.1 °C)     BP Readings from Last 3 Encounters:   06/19/19 114/62   06/04/19 110/76   05/17/19 90/57     Pulse Readings from Last 3 Encounters:   06/19/19 86   06/04/19 (!) 55   05/17/19 77       Learning Assessment:  :     Learning Assessment 6/19/2019 6/22/2018 6/16/2017 2/6/2015 12/2/2013 2/15/2013   PRIMARY LEARNER Patient Patient Patient Patient Patient Patient   HIGHEST LEVEL OF EDUCATION - PRIMARY LEARNER  > 4 YEARS OF COLLEGE > 4 YEARS OF COLLEGE - > 4 YEARS OF COLLEGE 4 YEARS OF COLLEGE -   BARRIERS PRIMARY LEARNER NONE NONE - NONE NONE -   CO-LEARNER CAREGIVER No No - No No -   PRIMARY LANGUAGE ENGLISH ENGLISH ENGLISH ENGLISH ENGLISH ENGLISH    NEED - - - No - -   LEARNER PREFERENCE PRIMARY VIDEOS VIDEOS DEMONSTRATION DEMONSTRATION DEMONSTRATION DEMONSTRATION     DEMONSTRATION - - - - READING     - - - - - PICTURES   LEARNING SPECIAL TOPICS - - - no - -   ANSWERED BY patient patient patient patient self patient   RELATIONSHIP SELF SELF SELF SELF SELF SELF       Depression Screening:  :     3 most recent PHQ Screens 6/19/2019   PHQ Not Done Active Diagnosis of Depression or Bipolar Disorder   Little interest or pleasure in doing things Not at all   Feeling down, depressed, irritable, or hopeless Several days   Total Score PHQ 2 1       Abuse Screening:  :     Abuse Screening Questionnaire 6/19/2019 12/14/2018 6/18/2018 9/15/2017 6/16/2017 9/5/2014   Do you ever feel afraid of your partner? N N N N N N   Are you in a relationship with someone who physically or mentally threatens you? N N N N N N   Is it safe for you to go home?  Lashon Wilson

## 2019-06-20 LAB
ALBUMIN SERPL-MCNC: 4.6 G/DL (ref 3.6–4.8)
ALBUMIN/GLOB SERPL: 2 {RATIO} (ref 1.2–2.2)
ALP SERPL-CCNC: 65 IU/L (ref 39–117)
ALT SERPL-CCNC: 27 IU/L (ref 0–32)
APPEARANCE UR: CLEAR
AST SERPL-CCNC: 48 IU/L (ref 0–40)
BACTERIA #/AREA URNS HPF: NORMAL /[HPF]
BASOPHILS # BLD AUTO: 0 X10E3/UL (ref 0–0.2)
BASOPHILS NFR BLD AUTO: 1 %
BILIRUB SERPL-MCNC: 0.3 MG/DL (ref 0–1.2)
BILIRUB UR QL STRIP: NEGATIVE
BUN SERPL-MCNC: 17 MG/DL (ref 8–27)
BUN/CREAT SERPL: 33 (ref 12–28)
CALCIUM SERPL-MCNC: 9.8 MG/DL (ref 8.7–10.3)
CASTS URNS QL MICRO: NORMAL /LPF
CHLORIDE SERPL-SCNC: 101 MMOL/L (ref 96–106)
CHOLEST SERPL-MCNC: 193 MG/DL (ref 100–199)
CO2 SERPL-SCNC: 26 MMOL/L (ref 20–29)
COLOR UR: YELLOW
CREAT SERPL-MCNC: 0.52 MG/DL (ref 0.57–1)
EOSINOPHIL # BLD AUTO: 0.1 X10E3/UL (ref 0–0.4)
EOSINOPHIL NFR BLD AUTO: 2 %
EPI CELLS #/AREA URNS HPF: NORMAL /HPF (ref 0–10)
ERYTHROCYTE [DISTWIDTH] IN BLOOD BY AUTOMATED COUNT: 13.3 % (ref 12.3–15.4)
GLOBULIN SER CALC-MCNC: 2.3 G/DL (ref 1.5–4.5)
GLUCOSE SERPL-MCNC: 82 MG/DL (ref 65–99)
GLUCOSE UR QL: NEGATIVE
HCT VFR BLD AUTO: 35.1 % (ref 34–46.6)
HDLC SERPL-MCNC: 101 MG/DL
HGB BLD-MCNC: 11.3 G/DL (ref 11.1–15.9)
HGB UR QL STRIP: NEGATIVE
IMM GRANULOCYTES # BLD AUTO: 0 X10E3/UL (ref 0–0.1)
IMM GRANULOCYTES NFR BLD AUTO: 0 %
INTERPRETATION, 910389: NORMAL
IRON SATN MFR SERPL: 30 % (ref 15–55)
IRON SERPL-MCNC: 90 UG/DL (ref 27–139)
KETONES UR QL STRIP: NEGATIVE
LDLC SERPL CALC-MCNC: 85 MG/DL (ref 0–99)
LEUKOCYTE ESTERASE UR QL STRIP: NEGATIVE
LYMPHOCYTES # BLD AUTO: 1 X10E3/UL (ref 0.7–3.1)
LYMPHOCYTES NFR BLD AUTO: 17 %
MCH RBC QN AUTO: 30.7 PG (ref 26.6–33)
MCHC RBC AUTO-ENTMCNC: 32.2 G/DL (ref 31.5–35.7)
MCV RBC AUTO: 95 FL (ref 79–97)
MICRO URNS: NORMAL
MICRO URNS: NORMAL
MONOCYTES # BLD AUTO: 0.3 X10E3/UL (ref 0.1–0.9)
MONOCYTES NFR BLD AUTO: 5 %
MUCOUS THREADS URNS QL MICRO: PRESENT
NEUTROPHILS # BLD AUTO: 4.8 X10E3/UL (ref 1.4–7)
NEUTROPHILS NFR BLD AUTO: 75 %
NITRITE UR QL STRIP: NEGATIVE
PH UR STRIP: 6.5 [PH] (ref 5–7.5)
PLATELET # BLD AUTO: 254 X10E3/UL (ref 150–450)
POTASSIUM SERPL-SCNC: 4 MMOL/L (ref 3.5–5.2)
PROT SERPL-MCNC: 6.9 G/DL (ref 6–8.5)
PROT UR QL STRIP: NEGATIVE
RBC # BLD AUTO: 3.68 X10E6/UL (ref 3.77–5.28)
RBC #/AREA URNS HPF: NORMAL /HPF (ref 0–2)
SODIUM SERPL-SCNC: 141 MMOL/L (ref 134–144)
SP GR UR: 1.01 (ref 1–1.03)
TIBC SERPL-MCNC: 300 UG/DL (ref 250–450)
TRIGL SERPL-MCNC: 37 MG/DL (ref 0–149)
TSH SERPL DL<=0.005 MIU/L-ACNC: 1.98 UIU/ML (ref 0.45–4.5)
UIBC SERPL-MCNC: 210 UG/DL (ref 118–369)
URINALYSIS REFLEX, 377202: NORMAL
UROBILINOGEN UR STRIP-MCNC: 0.2 MG/DL (ref 0.2–1)
VLDLC SERPL CALC-MCNC: 7 MG/DL (ref 5–40)
WBC # BLD AUTO: 6.3 X10E3/UL (ref 3.4–10.8)
WBC #/AREA URNS HPF: NORMAL /HPF (ref 0–5)

## 2019-06-21 ENCOUNTER — PATIENT MESSAGE (OUTPATIENT)
Dept: INTERNAL MEDICINE CLINIC | Age: 64
End: 2019-06-21

## 2019-06-24 DIAGNOSIS — F41.9 ANXIETY: ICD-10-CM

## 2019-06-24 DIAGNOSIS — G47.9 SLEEP DISORDER: ICD-10-CM

## 2019-06-24 RX ORDER — CLONAZEPAM 0.5 MG/1
TABLET ORAL
Qty: 45 TAB | Refills: 0 | OUTPATIENT
Start: 2019-06-24 | End: 2019-07-23 | Stop reason: SDUPTHER

## 2019-06-24 NOTE — PROGRESS NOTES
6/24/2019 patient dropped off a letter because she couldn't get into her [employer]. She states that the [place] caused worsening insomnia and increased anxiety. She would like to continue with the clonazepam and she found improvement of taking mirapex 0.5 1 1/2 tab at bedtime    Orders Placed This Encounter    clonazePAM (KLONOPIN) 0.5 mg tablet     Sig: TAKE 1 AND 1/2 TABLET BY MOUTH EVERY NIGHT AT BEDTIME AS NEEDED     Dispense:  45 Tab     Refill:  0      reviewed 6/24/2019   .

## 2019-06-24 NOTE — PROGRESS NOTES
Spoke with pharmacist at [facility] [address] to call in script.     Requested Prescriptions     Signed Prescriptions Disp Refills    clonazePAM (KLONOPIN) 0.5 mg tablet 45 Tab 0     Sig: TAKE 1 AND 1/2 TABLET BY MOUTH EVERY NIGHT AT BEDTIME AS NEEDED

## 2019-06-26 ENCOUNTER — TELEPHONE (OUTPATIENT)
Dept: INTERNAL MEDICINE CLINIC | Age: 64
End: 2019-06-26

## 2019-06-26 NOTE — TELEPHONE ENCOUNTER
No documentation of phone call to patient in chart. Rx for Klonopin refilled 6/24/19 by Dr. Audra Hendrix to be called in, but no documentation this was done. Script in chart of Ambien printed 6/19/19 (likely given to patient at time of visit 6/19/19 w/ Dr. Audra Hendrix). Call to [facility] to verify, states both prescriptions have been picked up by patient. Return call to patient to inform. Spoke with patient, two identifiers verified. Advised of above, apologizes and states she was thinking of the wrong doctor. Informed of normal lab results sent via [address] Patient verbalizes understanding.

## 2019-06-26 NOTE — TELEPHONE ENCOUNTER
Pt said you call her yesterday and asked that she call back  And over head page you you can contact pt at [phone]

## 2019-06-26 NOTE — TELEPHONE ENCOUNTER
I think one of the nurses called to let her know that her refills were called to her pharmacy. Can you let her know.   thanks

## 2019-07-23 DIAGNOSIS — G47.9 SLEEP DISORDER: ICD-10-CM

## 2019-07-23 DIAGNOSIS — F41.9 ANXIETY: ICD-10-CM

## 2019-07-23 RX ORDER — CLONAZEPAM 0.5 MG/1
TABLET ORAL
Qty: 45 TAB | Refills: 0 | OUTPATIENT
Start: 2019-07-23 | End: 2019-08-28 | Stop reason: SDUPTHER

## 2019-07-23 NOTE — TELEPHONE ENCOUNTER
Orders Placed This Encounter    clonazePAM (KLONOPIN) 0.5 mg tablet     Sig: TAKE 1 & 1/2 TABLET BY MOUTH EVERY NIGHT AT BEDTIME AS NEEDED     Dispense:  45 Tab     Refill:  0       reviewed 7/23/2019

## 2019-07-24 NOTE — TELEPHONE ENCOUNTER
Verbal Rx provided to Alberta Snyder at [facility]. Rx read back and clarified x2.     Requested Prescriptions     Signed Prescriptions Disp Refills    clonazePAM (KLONOPIN) 0.5 mg tablet 45 Tab 0     Sig: TAKE 1 & 1/2 TABLET BY MOUTH EVERY NIGHT AT BEDTIME AS NEEDED     Authorizing Provider: Yifan Vann

## 2019-08-02 ENCOUNTER — OFFICE VISIT (OUTPATIENT)
Dept: INTERNAL MEDICINE CLINIC | Age: 64
End: 2019-08-02

## 2019-08-02 VITALS
TEMPERATURE: 98.5 F | RESPIRATION RATE: 16 BRPM | HEART RATE: 75 BPM | OXYGEN SATURATION: 98 % | SYSTOLIC BLOOD PRESSURE: 102 MMHG | BODY MASS INDEX: 17.58 KG/M2 | HEIGHT: 64 IN | WEIGHT: 103 LBS | DIASTOLIC BLOOD PRESSURE: 60 MMHG

## 2019-08-02 DIAGNOSIS — G25.81 RESTLESS LEG: ICD-10-CM

## 2019-08-02 DIAGNOSIS — G47.9 SLEEP DISTURBANCE: Primary | ICD-10-CM

## 2019-08-02 DIAGNOSIS — Z79.899 ENCOUNTER FOR LONG-TERM (CURRENT) USE OF MEDICATIONS: ICD-10-CM

## 2019-08-02 RX ORDER — CITALOPRAM 10 MG/1
15 TABLET ORAL DAILY
Qty: 45 TAB | Refills: 5 | Status: SHIPPED | OUTPATIENT
Start: 2019-08-02 | End: 2019-10-24 | Stop reason: DRUGHIGH

## 2019-08-02 NOTE — PROGRESS NOTES
Subjective:      Christophe Mireles is a 71 y.o. female who presents today for follow up of her restless legs and sleep disturbance. She wasn't able to tolerate ambien. She is back on clonazepam and using 1 1/2 tab of the 0.5mg tablet nightly. She is on mirapex 0.5mg 1 1/2 at bedtime. Patient Active Problem List   Diagnosis Code    Restless legs syndrome G25.81    Sleep disorder G47.9    Osteopenia M85.80    Celiac disease K90.0    Iron deficiency E61.1    Pap smear for cervical cancer screening Z12.4    History of screening mammography Z92.89    History of bone density study Z92.89    Colon cancer screening Z12.11    Herpetic lesion B00.9    Lumbar disc disease with radiculopathy M51.16    Depression with anxiety F41.8    Advance directive discussed with patient Z71.89    Mild depression ([address] F32.0          Review of Systems    Pertinent items are noted in HPI. Objective:     Visit Vitals  /60 (BP 1 Location: Left arm, BP Patient Position: Sitting)   Pulse 75   Temp 98.5 °F (36.9 °C) (Oral)   Resp 16   Ht 5' 3.5\" (1.613 m) Comment: patient reported   Wt 103 lb (46.7 kg)   SpO2 98%   BMI 17.96 kg/m²     General appearance: alert, cooperative, no distress, appears stated age  Lungs: clear to auscultation bilaterally  Heart: regular rate and rhythm, S1, S2 normal, no murmur, click, rub or gallop    Assessment/Plan:     1. Sleep disturbance  -will continue clonazepam 1 1/2 tablets per night. She often will take 1 tablet prior to bed and the other half tab when she wakes at 3am and can't get back to sleep. -recommended she restart her celexa 10mg nightly now instead of waiting until later fall which she typically does for seasonal affective disorder    2. Restless leg  -continue mirapex 0.5mg 2 tab at bedtime.      3. Encounter for long-term (current) use of medications         Follow-up Disposition:     Has a welcome to medicare exam 10/2019    Return if symptoms worsen or fail to improve. Advised patient to call back or return to office if symptoms worsen/change/persist.     Discussed expected course/resolution/complications of diagnosis in detail with patient. Medication risks/benefits/costs/interactions/alternatives discussed with patient. Patient was given an after visit summary which includes diagnoses, current medications, & vitals. Patient expressed understanding with the diagnosis and plan.

## 2019-08-13 DIAGNOSIS — F41.9 ANXIETY: ICD-10-CM

## 2019-08-13 DIAGNOSIS — G47.9 SLEEP DISORDER: ICD-10-CM

## 2019-08-16 ENCOUNTER — TELEPHONE (OUTPATIENT)
Dept: INTERNAL MEDICINE CLINIC | Age: 64
End: 2019-08-16

## 2019-08-16 ENCOUNTER — HOSPITAL ENCOUNTER (OUTPATIENT)
Dept: GENERAL RADIOLOGY | Age: 64
Discharge: HOME | End: 2019-08-16
Attending: NURSE PRACTITIONER
Payer: MEDICARE

## 2019-08-16 ENCOUNTER — OFFICE VISIT (OUTPATIENT)
Dept: INTERNAL MEDICINE CLINIC | Age: 64
End: 2019-08-16

## 2019-08-16 VITALS
WEIGHT: 103 LBS | TEMPERATURE: 98.1 F | DIASTOLIC BLOOD PRESSURE: 64 MMHG | BODY MASS INDEX: 17.58 KG/M2 | HEART RATE: 75 BPM | OXYGEN SATURATION: 98 % | RESPIRATION RATE: 18 BRPM | SYSTOLIC BLOOD PRESSURE: 96 MMHG | HEIGHT: 64 IN

## 2019-08-16 DIAGNOSIS — M79.644 PAIN OF FINGER OF RIGHT HAND: ICD-10-CM

## 2019-08-16 DIAGNOSIS — M79.644 PAIN OF FINGER OF RIGHT HAND: Primary | ICD-10-CM

## 2019-08-16 DIAGNOSIS — L98.9 SKIN LESION OF BACK: ICD-10-CM

## 2019-08-16 PROCEDURE — 73130 X-RAY EXAM OF HAND: CPT

## 2019-08-16 RX ORDER — CLONAZEPAM 0.5 MG/1
TABLET ORAL
Qty: 45 TAB | Refills: 0 | OUTPATIENT
Start: 2019-08-16

## 2019-08-16 NOTE — TELEPHONE ENCOUNTER
Joann Manuel patient requesting refill of her clonazepam.    She is taking 0.5mg 1 1/2 tablets at bedtime. Script last filled on 7/25/19 for 45 tablets.      reviewed 8/16/2019

## 2019-08-16 NOTE — TELEPHONE ENCOUNTER
Patient notified of xray result. No fracture, but mild arthritis. Finger sprain. Finger splint x 4 weeks. Continue ice as needed x 20 minutes every 3-4 hours.

## 2019-08-16 NOTE — PROGRESS NOTES
Verified name and birth date for privacy precautions. Chart reviewed in preparation for today's visit.      Chief Complaint   Patient presents with    Tick Removal     just above bra line, patient states she noticed today that she believes its from a hike on yesterday    Finger Pain     x2 weeks, patient injured middle finger on right hand, patient states it is tender          Health Maintenance Due   Topic    Shingrix Vaccine Age 50> (1 of 2)    DTaP/Tdap/Td series (2 - Td)    Pneumococcal 65+ years (1 of 2 - PCV13)    MEDICARE YEARLY EXAM          Wt Readings from Last 3 Encounters:   08/16/19 103 lb (46.7 kg)   08/02/19 103 lb (46.7 kg)   06/19/19 105 lb (47.6 kg)     Temp Readings from Last 3 Encounters:   08/16/19 98.1 °F (36.7 °C) (Oral)   08/02/19 98.5 °F (36.9 °C) (Oral)   06/19/19 98.7 °F (37.1 °C) (Oral)     BP Readings from Last 3 Encounters:   08/16/19 96/64   08/02/19 102/60   06/19/19 114/62     Pulse Readings from Last 3 Encounters:   08/16/19 75   08/02/19 75   06/19/19 86         Learning Assessment:  :     Learning Assessment 6/19/2019 6/22/2018 6/16/2017 2/6/2015 12/2/2013 2/15/2013   PRIMARY LEARNER Patient Patient Patient Patient Patient Patient   HIGHEST LEVEL OF EDUCATION - PRIMARY LEARNER  > 4 YEARS OF COLLEGE > 4 YEARS OF COLLEGE - > 4 YEARS OF COLLEGE 4 YEARS OF COLLEGE -   BARRIERS PRIMARY LEARNER NONE NONE - NONE NONE -   CO-LEARNER CAREGIVER No No - No No -   PRIMARY LANGUAGE ENGLISH ENGLISH ENGLISH ENGLISH ENGLISH ENGLISH    NEED - - - No - -   LEARNER PREFERENCE PRIMARY VIDEOS VIDEOS DEMONSTRATION DEMONSTRATION DEMONSTRATION DEMONSTRATION     DEMONSTRATION - - - - READING     - - - - - PICTURES   LEARNING SPECIAL TOPICS - - - no - -   ANSWERED BY patient patient patient patient self patient   RELATIONSHIP SELF SELF SELF SELF SELF SELF       Depression Screening:  :     3 most recent PHQ Screens 6/19/2019   PHQ Not Done Active Diagnosis of Depression or Bipolar Disorder   Little interest or pleasure in doing things Not at all   Feeling down, depressed, irritable, or hopeless Several days   Total Score PHQ 2 1       Fall Risk Assessment:  :     Fall Risk Assessment, last 12 mths 6/4/2019   Able to walk? Yes   Fall in past 12 months? No       Abuse Screening:  :     Abuse Screening Questionnaire 6/19/2019 12/14/2018 6/18/2018 9/15/2017 6/16/2017 9/5/2014   Do you ever feel afraid of your partner? N N N N N N   Are you in a relationship with someone who physically or mentally threatens you? N N N N N N   Is it safe for you to go home? Y Y Y Y Y Y       Coordination of Care Questionnaire:  :     1) Have you been to an emergency room, urgent care clinic since your last visit? no   Hospitalized since your last visit? no             2) Have you seen or consulted any other health care providers outside of [address] [place] [address] since your last visit? no  (Include any pap smears or colon screenings in this section.)    3) Do you have an Advance Directive on file? no      Patient is currently accompanied by her self & I have received verbal consent from Vivian Davies to discuss any/all medical information while he/she is present in the room.     ------------------------------------------------

## 2019-08-16 NOTE — PROGRESS NOTES
HISTORY OF PRESENT ILLNESS  Valeria Orantes is a 71 y.o. female. Patient reports concern for tick on her back, but she lives alone and can't see her back. She went hiking yesterday and didn't notice it until last night. On exam, there is no tick. It is a mole. Patient also reports swelling of right hand 3rd digit x 2 weeks. She was closing her patio door, which gets stuck at times. She used force to close it and her finger bent causing instant pain. She has tried ice and heat with little relief. Swelling and pain is still present. It is painful to touch or even try to shake hands. She is able to bend it. Visit Vitals  BP 96/64 (BP 1 Location: Left arm, BP Patient Position: Sitting)   Pulse 75   Temp 98.1 °F (36.7 °C) (Oral)   Resp 18   Ht 5' 3.5\" (1.613 m)   Wt 103 lb (46.7 kg)   SpO2 98%   BMI 17.96 kg/m²       HPI    Review of Systems   Musculoskeletal:        Finger swelling   Skin:        Skin lesion       Physical Exam   Constitutional: She appears well-developed and well-nourished. Musculoskeletal:   3rd digit right hand PIP joint swelling and tenderness; able to flex finger without difficulty, but mild pain with flexion; no erythema or warmth   Skin:   Mid-thoracic raised fleshy skin lesion, consistent in color, slightly hyperpigmented, defined border, non-tender; < 1 cm wide   Psychiatric: She has a normal mood and affect. ASSESSMENT and PLAN    ICD-10-CM ICD-9-CM    1. Pain of finger of right hand M79.644 729.5 XR HAND RT MIN 3 V   2.  Skin lesion of back L98.9 709.9      Orders Placed This Encounter    XR HAND RT MIN 3 V   xray ordered  Splint finger x 4-6 weeks  Ice x 20 minutes every 3 hours as needed  May take NSAIDS if needed for pain/swelling  Skin lesion appears benign  Advised annual skin check with derm, especially if changes in skin lesion noticed

## 2019-08-28 DIAGNOSIS — F41.9 ANXIETY: ICD-10-CM

## 2019-08-28 DIAGNOSIS — G47.9 SLEEP DISORDER: ICD-10-CM

## 2019-08-29 ENCOUNTER — TELEPHONE (OUTPATIENT)
Dept: INTERNAL MEDICINE CLINIC | Age: 64
End: 2019-08-29

## 2019-08-29 RX ORDER — CLONAZEPAM 0.5 MG/1
TABLET ORAL
Qty: 45 TAB | Refills: 0 | OUTPATIENT
Start: 2019-08-29 | End: 2019-10-11 | Stop reason: SDUPTHER

## 2019-08-29 NOTE — TELEPHONE ENCOUNTER
Orders Placed This Encounter    clonazePAM (KLONOPIN) 0.5 mg tablet     Sig: TAKE 1 & 1/2 TABLET BY MOUTH EVERY NIGHT AT BEDTIME AS NEEDED     Dispense:  45 Tab     Refill:  0       reviewed 8/29/2019

## 2019-08-29 NOTE — TELEPHONE ENCOUNTER
Pt wants someone to call her at [phone] this is about her medication clonezapam  That was last refill at [address] at [facility]

## 2019-08-29 NOTE — TELEPHONE ENCOUNTER
Spoke with pharmacist at [facility] [address]. Rx verbal provided as written.      Requested Prescriptions     Signed Prescriptions Disp Refills    clonazePAM (KLONOPIN) 0.5 mg tablet 45 Tab 0     Sig: TAKE 1 & 1/2 TABLET BY MOUTH EVERY NIGHT AT BEDTIME AS NEEDED     Authorizing Provider: Katherine Rosenberg

## 2019-10-11 DIAGNOSIS — G47.9 SLEEP DISORDER: ICD-10-CM

## 2019-10-11 DIAGNOSIS — F41.9 ANXIETY: ICD-10-CM

## 2019-10-11 RX ORDER — CLONAZEPAM 0.5 MG/1
TABLET ORAL
Qty: 45 TAB | Refills: 0 | OUTPATIENT
Start: 2019-10-11 | End: 2019-11-21 | Stop reason: SDUPTHER

## 2019-10-11 NOTE — TELEPHONE ENCOUNTER
Orders Placed This Encounter    clonazePAM (KLONOPIN) 0.5 mg tablet     Sig: TAKE 1 & 1/2 TABLET BY MOUTH EVERY NIGHT AT BEDTIME AS NEEDED     Dispense:  45 Tab     Refill:  0       reviewed 10/11/2019

## 2019-10-14 NOTE — TELEPHONE ENCOUNTER
Call to [facility] [address] and spoke with Tia Mohr,  pharmacist. Verbal order for Rx was provided, as written and read back.     Requested Prescriptions     Signed Prescriptions Disp Refills    clonazePAM (KLONOPIN) 0.5 mg tablet 45 Tab 0     Sig: TAKE 1 & 1/2 TABLET BY MOUTH EVERY NIGHT AT BEDTIME AS NEEDED     Authorizing Provider: Yemi Garland

## 2019-10-24 ENCOUNTER — OFFICE VISIT (OUTPATIENT)
Dept: INTERNAL MEDICINE CLINIC | Age: 64
End: 2019-10-24

## 2019-10-24 VITALS
HEART RATE: 78 BPM | OXYGEN SATURATION: 95 % | BODY MASS INDEX: 17.62 KG/M2 | RESPIRATION RATE: 14 BRPM | DIASTOLIC BLOOD PRESSURE: 60 MMHG | WEIGHT: 103.2 LBS | TEMPERATURE: 98 F | HEIGHT: 64 IN | SYSTOLIC BLOOD PRESSURE: 90 MMHG

## 2019-10-24 DIAGNOSIS — Z13.39 SCREENING FOR ALCOHOLISM: ICD-10-CM

## 2019-10-24 DIAGNOSIS — Z13.31 SCREENING FOR DEPRESSION: ICD-10-CM

## 2019-10-24 DIAGNOSIS — Z00.00 WELCOME TO MEDICARE PREVENTIVE VISIT: Primary | ICD-10-CM

## 2019-10-24 RX ORDER — CITALOPRAM 10 MG/1
10 TABLET ORAL 2 TIMES DAILY
Qty: 60 TAB | Refills: 5 | Status: SHIPPED | OUTPATIENT
Start: 2019-10-24 | End: 2020-07-08 | Stop reason: ALTCHOICE

## 2019-10-24 NOTE — PROGRESS NOTES
Subjective:      Chichi Garcia is a 71 y.o. female who presents today for her Welcome to [facility] Wellness Exam.     She has history of restless leg syndrome, sleep disturbance and mild depression. Currently taking celexa and mirapex. Both medication doses were increased a few months ago and she is finding that she is sleeping much better. Due for: patient refused all immunizations. -prevnar 15   -flu  -shingrix  -tdap    Patient Active Problem List   Diagnosis Code    Restless legs syndrome G25.81    Sleep disorder G47.9    Osteopenia M85.80    Celiac disease K90.0    Iron deficiency E61.1    Pap smear for cervical cancer screening Z12.4    History of screening mammography Z92.89    History of bone density study Z92.89    Colon cancer screening Z12.11    Herpetic lesion B00.9    Lumbar disc disease with radiculopathy M51.16    Depression with anxiety F41.8    Advance directive discussed with patient Z71.89    Mild depression (HCC) F32.0     Current Outpatient Medications   Medication Sig Dispense Refill    citalopram (CELEXA) 10 mg tablet Take 1 Tab by mouth two (2) times a day. 60 Tab 5    clonazePAM (KLONOPIN) 0.5 mg tablet TAKE 1 & 1/2 TABLET BY MOUTH EVERY NIGHT AT BEDTIME AS NEEDED 45 Tab 0    pramipexole (MIRAPEX) 0.5 mg tablet TAKE ONE TABLET 2 TIMES A DAY [facility] AUTHOR[employer] BRAND TMQ#[id number]) 180 Tab 1    OTHER boswellia herbal complex twice daily      estradiol (VAGIFEM) 10 mcg tab vaginal tablet Insert 10 mcg into vagina every Tuesday and Friday.  estradiol 0.025 mg/24 hr ptsw Apply 1 Patch to affected area two (2) days a week.  progesterone (PROMETRIUM) 100 mg capsule Take 100 mg by mouth daily.  OTHER Indications: Sleep support melatonin      OTHER hemagenics iron supplement once daily      OTHER Chinese herbs      valACYclovir (VALTREX) 500 mg tablet Take 1 Tab by mouth two (2) times a day. For 1-3 days as needed.  30 Tab 3        Review of Systems    A comprehensive review of systems was negative except for that written in the HPI. Objective:     Visit Vitals  BP 90/60 (BP 1 Location: Left arm, BP Patient Position: Sitting)   Pulse 78   Temp 98 °F (36.7 °C)   Resp 14   Ht 5' 3.88\" (1.623 m)   Wt 103 lb 3.2 oz (46.8 kg)   SpO2 95%   BMI 17.78 kg/m²     General appearance: alert, cooperative, no distress, appears stated age  Head: Normocephalic, without obvious abnormality, atraumatic  Neck: supple, symmetrical, trachea midline, no adenopathy, no carotid bruit and no JVD  Lungs: clear to auscultation bilaterally  Heart: regular rate and rhythm, S1, S2 normal, no murmur, click, rub or gallop  Abdomen: soft, non-tender. Bowel sounds normal. No masses,  no organomegaly  Extremities: extremities normal, atraumatic, no cyanosis or edema  Pulses: 2+ and symmetric    Assessment/Plan:         Follow-up Disposition:         Return if symptoms worsen or fail to improve. Advised patient to call back or return to office if symptoms worsen/change/persist.     Discussed expected course/resolution/complications of diagnosis in detail with patient. Medication risks/benefits/costs/interactions/alternatives discussed with patient. Patient was given an after visit summary which includes diagnoses, current medications, & vitals. Patient expressed understanding with the diagnosis and plan. This is a \"Welcome to [employer]"  Initial Preventive Physical Examination (IPPE) providing Personalized Prevention Plan Services (Performed in the first 12 months of enrollment)    I have reviewed the patient's medical history in detail and updated the computerized patient record.      History     Past Medical History:   Diagnosis Date    Arthritis     Left great toe, R knee, L thumb    Celiac syndrome     by blood testing    Iron deficiency     Restless legs       Past Surgical History:   Procedure Laterality Date    ENDOSCOPY, COLON, DIAGNOSTIC  2009 & 02/13    benign sessile polyp in 2013 - repeat in 5 years   [facility]    lap for blocked fallopian tube    HX GYN           HX ORTHOPAEDIC  1975    R knee menisectomy       Allergies   Allergen Reactions    Ambien Cr [Zolpidem] Other (comments)     Agitation & mood lability; higher dosages only    Requip [Ropinirole] Nausea Only     Family History   Problem Relation Age of Onset    Kidney Disease Mother     Hypertension Mother     Heart Failure Mother     Stroke Father     Arthritis-osteo Father     Heart Failure Father     Thyroid Disease Sister     Arthritis-osteo Paternal Grandmother      Social History     Tobacco Use    Smoking status: Never Smoker    Smokeless tobacco: Never Used   Substance Use Topics    Alcohol use: Yes     Alcohol/week: 2.5 - 3.3 standard drinks     Types: 3 - 4 Glasses of wine per week     Comment: Rarely     Diet, Lifestyle: gluten free    Exercise level: moderately active    Depression Risk Screen     3 most recent PHQ Screens 2019   PHQ Not Done Active Diagnosis of Depression or Bipolar Disorder   Little interest or pleasure in doing things Not at all   Feeling down, depressed, irritable, or hopeless Several days   Total Score PHQ 2 1     Alcohol Risk Screen   4-5 drinks per week    Functional Ability and Level of Safety   Hearing Loss  Hearing is good. Vision Screening  Vision is good. Uses glasses for reading - last eye exam was 1 1/2 year ago - Dr. Berna Zaidi. No exam data present      Activities of Daily Living  The home contains: handrails and grab bars  Patient does total self care    Fall Risk Screen  Fall Risk Assessment, last 12 mths 10/24/2019   Able to walk? Yes   Fall in past 12 months?  No       Abuse Screen  Patient is not abused    Screening EKG   EKG order placed: Yes    Patient Care Team   Patient Care Team:  Camryn Moralez MD as PCP - General (Internal Medicine)     End of Life Planning   Advanced care planning directives were discussed with the patient and /or family/caregiver. patient has an established AD. Will bring at her next visit     Assessment/Plan   Education and counseling provided:  Are appropriate based on today's review and evaluation    Diagnoses and all orders for this visit:    1. Welcome to Medicare preventive visit  -     AMB POC EKG ROUTINE W/ 12 LEADS, INTER & REP  -     CO ANNUAL ALCOHOL SCREEN 15 MIN  -     DEPRESSION SCREEN ANNUAL  -     AMB POC EKG ROUTINE W/ 12 LEADS, SCREEN ()    2. Screening for alcoholism  -     CO ANNUAL ALCOHOL SCREEN 15 MIN    3. Screening for depression  -     DEPRESSION SCREEN ANNUAL    4. Restless legs  -taking mirapex 0.5mg 1 1/2 tab at bedtime. 5. Mild depression    . orders  -very stable with celexa 10mg 1 1/2 tab daily.

## 2019-10-24 NOTE — PATIENT INSTRUCTIONS
Medicare Wellness Visit, Female The best way to live healthy is to have a lifestyle where you eat a well-balanced diet, exercise regularly, limit alcohol use, and quit all forms of tobacco/nicotine, if applicable. Regular preventive services are another way to keep healthy. Preventive services (vaccines, screening tests, monitoring & exams) can help personalize your care plan, which helps you manage your own care. Screening tests can find health problems at the earliest stages, when they are easiest to treat. [facility] follows the current, evidence-based guidelines published by the [facility] Migel Tio ([facility]) when recommending preventive services for our patients. Because we follow these guidelines, sometimes recommendations change over time as research supports it. (For example, mammograms used to be recommended annually. Even though Medicare will still pay for an annual mammogram, the newer guidelines recommend a mammogram every two years for women of average risk.) Of course, you and your doctor may decide to screen more often for some diseases, based on your risk and your health status. Preventive services for you include: - Medicare offers their members a free annual wellness visit, which is time for you and your primary care provider to discuss and plan for your preventive service needs. Take advantage of this benefit every year! 
-All adults over the age of 72 should receive the recommended pneumonia vaccines. Current USPSTF guidelines recommend a series of two vaccines for the best pneumonia protection.  
-All adults should have a flu vaccine yearly and a tetanus vaccine every 10 years. All adults age 61 and older should receive a shingles vaccine once in their lifetime.   
-A bone mass density test is recommended when a woman turns 65 to screen for osteoporosis. This test is only recommended one time, as a screening. Some providers will use this same test as a disease monitoring tool if you already have osteoporosis. -All adults age 38-68 who are overweight should have a diabetes screening test once every three years.  
-Other screening tests and preventive services for persons with diabetes include: an eye exam to screen for diabetic retinopathy, a kidney function test, a foot exam, and stricter control over your cholesterol.  
-Cardiovascular screening for adults with routine risk involves an electrocardiogram (ECG) at intervals determined by your doctor.  
-Colorectal cancer screenings should be done for adults age 54-65 with no increased risk factors for colorectal cancer. There are a number of acceptable methods of screening for this type of cancer. Each test has its own benefits and drawbacks. Discuss with your doctor what is most appropriate for you during your annual wellness visit. The different tests include: colonoscopy (considered the best screening method), a fecal occult blood test, a fecal DNA test, and sigmoidoscopy. -Breast cancer screenings are recommended every other year for women of normal risk, age 54-69. 
-Cervical cancer screenings for women over age 72 are only recommended with certain risk factors.  
-All adults born between [facility] should be screened once for Hepatitis C. Here is a list of your current Health Maintenance items (your personalized list of preventive services) with a due date: 
Health Maintenance Due Topic Date Due  Shingles Vaccine (1 of 2) 03/17/2004  DTaP/Tdap/Td  (2 - Td) 12/17/2018  Pneumococcal Vaccine (1 of 2 - PCV13) 03/17/2019 Cecile Atkinson Annual Well Visit  05/17/2019 Medicare Wellness Visit, Female The best way to live healthy is to have a lifestyle where you eat a well-balanced diet, exercise regularly, limit alcohol use, and quit all forms of tobacco/nicotine, if applicable. Regular preventive services are another way to keep healthy.  Preventive services (vaccines, screening tests, monitoring & exams) can help personalize your care plan, which helps you manage your own care. Screening tests can find health problems at the earliest stages, when they are easiest to treat. [facility] follows the current, evidence-based guidelines published by the [facility] Migel Kinsey ([facility]) when recommending preventive services for our patients. Because we follow these guidelines, sometimes recommendations change over time as research supports it. (For example, mammograms used to be recommended annually. Even though Medicare will still pay for an annual mammogram, the newer guidelines recommend a mammogram every two years for women of average risk.) Of course, you and your doctor may decide to screen more often for some diseases, based on your risk and your health status. Preventive services for you include: - Medicare offers their members a free annual wellness visit, which is time for you and your primary care provider to discuss and plan for your preventive service needs. Take advantage of this benefit every year! 
-All adults over the age of 72 should receive the recommended pneumonia vaccines. Current USPSTF guidelines recommend a series of two vaccines for the best pneumonia protection.  
-All adults should have a flu vaccine yearly and a tetanus vaccine every 10 years. All adults age 61 and older should receive a shingles vaccine once in their lifetime.   
-A bone mass density test is recommended when a woman turns 65 to screen for osteoporosis. This test is only recommended one time, as a screening. Some providers will use this same test as a disease monitoring tool if you already have osteoporosis.  
-All adults age 38-68 who are overweight should have a diabetes screening test once every three years.  
-Other screening tests and preventive services for persons with diabetes include: an eye exam to screen for diabetic retinopathy, a kidney function test, a foot exam, and stricter control over your cholesterol.  
-Cardiovascular screening for adults with routine risk involves an electrocardiogram (ECG) at intervals determined by your doctor.  
-Colorectal cancer screenings should be done for adults age 54-65 with no increased risk factors for colorectal cancer. There are a number of acceptable methods of screening for this type of cancer. Each test has its own benefits and drawbacks. Discuss with your doctor what is most appropriate for you during your annual wellness visit. The different tests include: colonoscopy (considered the best screening method), a fecal occult blood test, a fecal DNA test, and sigmoidoscopy. -Breast cancer screenings are recommended every other year for women of normal risk, age 54-69. 
-Cervical cancer screenings for women over age 72 are only recommended with certain risk factors.  
-All adults born between [facility] should be screened once for Hepatitis C. Here is a list of your current Health Maintenance items (your personalized list of preventive services) with a due date: 
Health Maintenance Due Topic Date Due  Shingles Vaccine (1 of 2) 03/17/2004  DTaP/Tdap/Td  (2 - Td) 12/17/2018  Pneumococcal Vaccine (1 of 2 - PCV13) 03/17/2019 Brandy Delacruz Annual Well Visit  05/17/2019

## 2019-11-21 DIAGNOSIS — F41.9 ANXIETY: ICD-10-CM

## 2019-11-21 DIAGNOSIS — G47.9 SLEEP DISORDER: ICD-10-CM

## 2019-11-22 DIAGNOSIS — F41.9 ANXIETY: ICD-10-CM

## 2019-11-22 DIAGNOSIS — G47.9 SLEEP DISORDER: ICD-10-CM

## 2019-11-22 RX ORDER — CLONAZEPAM 0.5 MG/1
TABLET ORAL
Qty: 45 TAB | Refills: 0 | OUTPATIENT
Start: 2019-11-22

## 2019-11-22 RX ORDER — CLONAZEPAM 0.5 MG/1
TABLET ORAL
Qty: 45 TAB | Refills: 1 | Status: SHIPPED | OUTPATIENT
Start: 2019-11-22 | End: 2020-02-09

## 2019-11-22 NOTE — TELEPHONE ENCOUNTER
Orders Placed This Encounter    clonazePAM (KLONOPIN) 0.5 mg tablet     Sig: TAKE 1 AND 1/2 TABLETS BY MOUTH EVERY NIGHT AT BEDTIME AS NEEDED.      Dispense:  45 Tab     Refill:  1       reviewed 11/22/2019

## 2020-01-20 ENCOUNTER — HOSPITAL ENCOUNTER (OUTPATIENT)
Dept: MAMMOGRAPHY | Age: 65
Discharge: HOME | End: 2020-01-20
Payer: MEDICARE

## 2020-01-20 DIAGNOSIS — N63.10 UNSPECIFIED LUMP IN THE RIGHT BREAST, UNSPECIFIED QUADRANT: ICD-10-CM

## 2020-01-20 DIAGNOSIS — R92.8 ABNORMAL MAMMOGRAM: ICD-10-CM

## 2020-01-20 PROCEDURE — 77061 BREAST TOMOSYNTHESIS UNI: CPT

## 2020-01-20 PROCEDURE — 76642 ULTRASOUND BREAST LIMITED: CPT

## 2020-01-29 ENCOUNTER — OFFICE VISIT (OUTPATIENT)
Dept: INTERNAL MEDICINE CLINIC | Age: 65
End: 2020-01-29

## 2020-01-29 VITALS
BODY MASS INDEX: 17.42 KG/M2 | SYSTOLIC BLOOD PRESSURE: 102 MMHG | HEART RATE: 73 BPM | OXYGEN SATURATION: 92 % | WEIGHT: 102 LBS | DIASTOLIC BLOOD PRESSURE: 56 MMHG | TEMPERATURE: 97.5 F | HEIGHT: 64 IN | RESPIRATION RATE: 16 BRPM

## 2020-01-29 DIAGNOSIS — R53.82 CHRONIC FATIGUE, UNSPECIFIED: ICD-10-CM

## 2020-01-29 DIAGNOSIS — K90.0 CELIAC DISEASE: ICD-10-CM

## 2020-01-29 DIAGNOSIS — G25.81 RESTLESS LEGS SYNDROME: ICD-10-CM

## 2020-01-29 DIAGNOSIS — R42 LIGHT HEADEDNESS: ICD-10-CM

## 2020-01-29 DIAGNOSIS — F51.3 SLEEP WALKING AND EATING: Primary | ICD-10-CM

## 2020-01-29 NOTE — PROGRESS NOTES
Verified name and birth date for privacy precautions. Chart reviewed in preparation for today's visit. Chief Complaint   Patient presents with    Sleep Problem     recent episodes of sleep walking           There are no preventive care reminders to display for this patient.       Wt Readings from Last 3 Encounters:   01/29/20 102 lb (46.3 kg)   10/24/19 103 lb 3.2 oz (46.8 kg)   08/16/19 103 lb (46.7 kg)     Temp Readings from Last 3 Encounters:   01/29/20 97.5 °F (36.4 °C) (Oral)   10/24/19 98 °F (36.7 °C)   08/16/19 98.1 °F (36.7 °C) (Oral)     BP Readings from Last 3 Encounters:   01/29/20 102/56   10/24/19 90/60   08/16/19 96/64     Pulse Readings from Last 3 Encounters:   01/29/20 73   10/24/19 78   08/16/19 75         Learning Assessment:  :     Learning Assessment 1/29/2020 6/19/2019 6/22/2018 6/16/2017 2/6/2015 12/2/2013 2/15/2013   PRIMARY LEARNER Patient Patient Patient Patient Patient Patient Patient   HIGHEST LEVEL OF EDUCATION - PRIMARY LEARNER  4 YEARS OF COLLEGE > 4 YEARS OF COLLEGE > 4 YEARS OF COLLEGE - > 4 YEARS OF COLLEGE 4 YEARS OF COLLEGE -   BARRIERS PRIMARY LEARNER NONE NONE NONE - NONE NONE -   CO-LEARNER CAREGIVER No No No - No No -   PRIMARY LANGUAGE ENGLISH ENGLISH ENGLISH ENGLISH ENGLISH ENGLISH ENGLISH    NEED - - - - No - -   LEARNER PREFERENCE PRIMARY DEMONSTRATION VIDEOS VIDEOS DEMONSTRATION DEMONSTRATION DEMONSTRATION DEMONSTRATION     - DEMONSTRATION - - - - READING     - - - - - - PICTURES   LEARNING SPECIAL TOPICS - - - - no - -   ANSWERED BY patient  patient patient patient patient self patient   RELATIONSHIP SELF SELF SELF SELF SELF SELF SELF       Depression Screening:  :     3 most recent PHQ Screens 6/19/2019   PHQ Not Done Active Diagnosis of Depression or Bipolar Disorder   Little interest or pleasure in doing things Not at all   Feeling down, depressed, irritable, or hopeless Several days   Total Score PHQ 2 1       Fall Risk Assessment:  :     Fall Risk Assessment, last 12 mths 1/29/2020   Able to walk? Yes   Fall in past 12 months? No       Abuse Screening:  :     Abuse Screening Questionnaire 1/29/2020 6/19/2019 12/14/2018 6/18/2018 9/15/2017 6/16/2017 9/5/2014   Do you ever feel afraid of your partner? N N N N N N N   Are you in a relationship with someone who physically or mentally threatens you? N N N N N N N   Is it safe for you to go home? Y Y Y Y Y Y Y       Coordination of Care Questionnaire:  :     1) Have you been to an emergency room, urgent care clinic since your last visit? no   Hospitalized since your last visit? no             2) Have you seen or consulted any other health care providers outside of [address] [place] [address] since your last visit?  yes  (Include any pap smears or colon screenings in this section.)    3) Do you have an Advance Directive on file? no          ------------------------------------------------

## 2020-01-29 NOTE — PROGRESS NOTES
72 yo female has been sleep walking and nocturnal eating which she discovers evidence of later. This has been occurring several times a week over the last several months. Around that time, she had increased her Mirapex dose in PM.   Most recently, she awoke in AM, couldn't find her phone, and found it in the microwave on top of some fruit in a skillet there. This \"flipped me out\". She talked with a Psychologist friend. She started her Celexa (for SADD) earlier this fall, so weaned down and stopped it 2-3 weeks ago. She has felt light-headed and spacy, although she is able to hike and carry on usual activities. She stopped taking all her supplements and herbs several months ago. She is sleeping well from 10:30 pm to 3 am, and after that, sleep is not good. She plans to see Dr. Sabino Hatch for re-evaluation of medications used for restless legs. She has been on Mirapex for years for this. She takes Clonazepam 0.5 mg nightly, but has never increased the dose to the extra 1/2 tab she discussed with Dr. Hemalatha Oneill.     There are no increase in life's stressors for her. She goes out with friends. She is exercising. PE: WNWD WF is alert   BP = 102/56   Carotids - no bruits   Heart - RRR   Ears - TMs slightly dull    Imp: Sleep walking and eating   Light-headedness    Plan: Labs   Increase Clonazepam to 1 & 1/2 tabs at HS   Discussed setting up a camera   She will try to see  sooner  _________________________  Expected course of current diagnosed problem(s) as well as expected progression and possible complications, and desired follow up with provider are discussed with patient. Patient is encouraged to be back in touch with any questions or concerns. Patient expresses understanding of plan of care. Patient is given AVS which includes diagnoses, current medications, vitals.

## 2020-01-30 ENCOUNTER — HOSPITAL ENCOUNTER (OUTPATIENT)
Dept: MAMMOGRAPHY | Age: 65
Discharge: HOME | End: 2020-01-30
Payer: MEDICARE

## 2020-01-30 DIAGNOSIS — Z78.0 POSTMENOPAUSAL STATUS (AGE-RELATED) (NATURAL): ICD-10-CM

## 2020-01-30 PROCEDURE — 77080 DXA BONE DENSITY AXIAL: CPT

## 2020-02-04 ENCOUNTER — HOSPITAL ENCOUNTER (OUTPATIENT)
Dept: LAB | Age: 65
Discharge: HOME | End: 2020-02-04
Payer: MEDICARE

## 2020-02-04 PROCEDURE — 36415 COLL VENOUS BLD VENIPUNCTURE: CPT

## 2020-02-04 PROCEDURE — 85025 COMPLETE CBC W/AUTO DIFF WBC: CPT

## 2020-02-04 PROCEDURE — 80053 COMPREHEN METABOLIC PANEL: CPT

## 2020-02-04 PROCEDURE — 84443 ASSAY THYROID STIM HORMONE: CPT

## 2020-02-05 LAB
ALBUMIN SERPL-MCNC: 4.4 G/DL (ref 3.8–4.8)
ALBUMIN/GLOB SERPL: 1.9 {RATIO} (ref 1.2–2.2)
ALP SERPL-CCNC: 83 IU/L (ref 39–117)
ALT SERPL-CCNC: 15 IU/L (ref 0–32)
AST SERPL-CCNC: 25 IU/L (ref 0–40)
BASOPHILS # BLD AUTO: 0 X10E3/UL (ref 0–0.2)
BASOPHILS NFR BLD AUTO: 1 %
BILIRUB SERPL-MCNC: 0.3 MG/DL (ref 0–1.2)
BUN SERPL-MCNC: 16 MG/DL (ref 8–27)
BUN/CREAT SERPL: 28 (ref 12–28)
CALCIUM SERPL-MCNC: 9.5 MG/DL (ref 8.7–10.3)
CHLORIDE SERPL-SCNC: 102 MMOL/L (ref 96–106)
CO2 SERPL-SCNC: 26 MMOL/L (ref 20–29)
CREAT SERPL-MCNC: 0.58 MG/DL (ref 0.57–1)
EOSINOPHIL # BLD AUTO: 0.2 X10E3/UL (ref 0–0.4)
EOSINOPHIL NFR BLD AUTO: 5 %
ERYTHROCYTE [DISTWIDTH] IN BLOOD BY AUTOMATED COUNT: 11.6 % (ref 11.7–15.4)
GLOBULIN SER CALC-MCNC: 2.3 G/DL (ref 1.5–4.5)
GLUCOSE SERPL-MCNC: 95 MG/DL (ref 65–99)
HCT VFR BLD AUTO: 35.4 % (ref 34–46.6)
HGB BLD-MCNC: 11.7 G/DL (ref 11.1–15.9)
IMM GRANULOCYTES # BLD AUTO: 0 X10E3/UL (ref 0–0.1)
IMM GRANULOCYTES NFR BLD AUTO: 0 %
LYMPHOCYTES # BLD AUTO: 1.3 X10E3/UL (ref 0.7–3.1)
LYMPHOCYTES NFR BLD AUTO: 30 %
MCH RBC QN AUTO: 31.9 PG (ref 26.6–33)
MCHC RBC AUTO-ENTMCNC: 33.1 G/DL (ref 31.5–35.7)
MCV RBC AUTO: 97 FL (ref 79–97)
MONOCYTES # BLD AUTO: 0.4 X10E3/UL (ref 0.1–0.9)
MONOCYTES NFR BLD AUTO: 9 %
NEUTROPHILS # BLD AUTO: 2.5 X10E3/UL (ref 1.4–7)
NEUTROPHILS NFR BLD AUTO: 55 %
PLATELET # BLD AUTO: 217 X10E3/UL (ref 150–450)
POTASSIUM SERPL-SCNC: 4.2 MMOL/L (ref 3.5–5.2)
PROT SERPL-MCNC: 6.7 G/DL (ref 6–8.5)
RBC # BLD AUTO: 3.67 X10E6/UL (ref 3.77–5.28)
SODIUM SERPL-SCNC: 141 MMOL/L (ref 134–144)
TSH SERPL DL<=0.005 MIU/L-ACNC: 2.65 UIU/ML (ref 0.45–4.5)
WBC # BLD AUTO: 4.5 X10E3/UL (ref 3.4–10.8)

## 2020-02-09 DIAGNOSIS — G47.9 SLEEP DISORDER: ICD-10-CM

## 2020-02-09 DIAGNOSIS — F41.9 ANXIETY: ICD-10-CM

## 2020-02-09 RX ORDER — CLONAZEPAM 0.5 MG/1
TABLET ORAL
Qty: 45 TAB | Refills: 0 | Status: SHIPPED | OUTPATIENT
Start: 2020-02-09 | End: 2020-03-16

## 2020-02-09 NOTE — TELEPHONE ENCOUNTER
Orders Placed This Encounter    clonazePAM (KLONOPIN) 0.5 mg tablet     Sig: TAKE 1 AND 1/2 TABLETS BY MOUTH EVERY NIGHT AT BEDTIME AS NEEDED.      Dispense:  45 Tab     Refill:  0      reviewed 2/9/2020

## 2020-02-20 ENCOUNTER — TELEPHONE (OUTPATIENT)
Dept: INTERNAL MEDICINE CLINIC | Age: 65
End: 2020-02-20

## 2020-03-16 DIAGNOSIS — F41.9 ANXIETY: ICD-10-CM

## 2020-03-16 DIAGNOSIS — G47.9 SLEEP DISORDER: ICD-10-CM

## 2020-03-16 RX ORDER — CLONAZEPAM 0.5 MG/1
TABLET ORAL
Qty: 45 TAB | Refills: 0 | Status: SHIPPED | OUTPATIENT
Start: 2020-03-16 | End: 2020-04-22

## 2020-03-16 NOTE — TELEPHONE ENCOUNTER
Orders Placed This Encounter  clonazePAM (KlonoPIN) 0.5 mg tablet Sig: TAKE 1 AND 1/2 TABLETS BY MOUTH EVERY NIGHT AT BEDTIME AS NEEDED. Dispense:  45 Tab Refill:  0  reviewed 3/16/2020

## 2020-04-22 DIAGNOSIS — F41.9 ANXIETY: ICD-10-CM

## 2020-04-22 DIAGNOSIS — G47.9 SLEEP DISORDER: ICD-10-CM

## 2020-04-22 RX ORDER — CLONAZEPAM 0.5 MG/1
TABLET ORAL
Qty: 45 TAB | Refills: 0 | Status: SHIPPED | OUTPATIENT
Start: 2020-04-22 | End: 2020-06-08

## 2020-04-22 RX ORDER — CLONAZEPAM 0.5 MG/1
TABLET ORAL
Qty: 45 TAB | Refills: 0 | Status: SHIPPED | OUTPATIENT
Start: 2020-04-22 | End: 2020-04-22 | Stop reason: SDUPTHER

## 2020-04-22 NOTE — TELEPHONE ENCOUNTER
Orders Placed This Encounter    clonazePAM (KlonoPIN) 0.5 mg tablet     Si 1/2 tab at bedtime as needed     Dispense:  45 Tab     Refill:  0       reviewed 2020

## 2020-04-22 NOTE — TELEPHONE ENCOUNTER
Requested Prescriptions     Pending Prescriptions Disp Refills    clonazePAM (KlonoPIN) 0.5 mg tablet 45 Tab 0

## 2020-04-22 NOTE — TELEPHONE ENCOUNTER
Orders Placed This Encounter  clonazePAM (KlonoPIN) 0.5 mg tablet Sig: TAKE 1 AND 1/2 TABLETS BY MOUTH EVERY NIGHT AT BEDTIME AS NEEDED. Dispense:  45 Tab Refill:  0  reviewed 4/22/2020

## 2020-04-29 ENCOUNTER — VIRTUAL VISIT (OUTPATIENT)
Dept: SLEEP MEDICINE | Age: 65
End: 2020-04-29

## 2020-04-29 VITALS
DIASTOLIC BLOOD PRESSURE: 70 MMHG | WEIGHT: 108 LBS | SYSTOLIC BLOOD PRESSURE: 100 MMHG | HEIGHT: 64 IN | BODY MASS INDEX: 18.44 KG/M2

## 2020-04-29 DIAGNOSIS — G25.81 RESTLESS LEGS SYNDROME: Primary | ICD-10-CM

## 2020-04-29 DIAGNOSIS — F51.3 SLEEPWALKING (SOMNAMBULISM): ICD-10-CM

## 2020-04-29 DIAGNOSIS — G47.00 INSOMNIA, UNSPECIFIED TYPE: ICD-10-CM

## 2020-04-29 RX ORDER — PRAMIPEXOLE DIHYDROCHLORIDE 0.75 MG/1
0.5 TABLET ORAL
COMMUNITY
End: 2020-07-08 | Stop reason: SDUPTHER

## 2020-04-29 NOTE — PATIENT INSTRUCTIONS
217 [facility]., [place]. 16[address] [facility], [address] Tel.  [phone] Fax. 100 [facility] 60 [place], [address] Tel.  [phone] Fax. [phone] 9[address] [employer] Gabriela Barros Tel.  [phone] Fax. [phone] Insomnia: After Your Visit Your Care Instructions Insomnia is the inability to sleep well. It is a common problem for most people at some time. Insomnia may make it hard for you to get to sleep, stay asleep, or sleep as long as you need to. This can make you tired and grouchy during the day. It can also make you forgetful, less effective at work, and unhappy. Insomnia can be caused by conditions such as depression or anxiety. Pain can also affect your ability to sleep. When these problems are solved, the insomnia usually clears up. But sometimes bad sleep habits can cause insomnia. If insomnia is affecting your work or your enjoyment of life, you can take steps to improve your sleep. Follow-up care is a key part of your treatment and safety. Be sure to make and go to all appointments, and call your doctor if you are having problems. It's also a good idea to know your test results and keep a list of the medicines you take. How can you care for yourself at home? What to avoid · Do not have drinks with caffeine, such as coffee or black tea, for 8 hours before bed. · Do not smoke or use other types of tobacco near bedtime. Nicotine is a stimulant and can keep you awake. · Avoid drinking alcohol late in the evening, because it can cause you to wake in the middle of the night. · Do not eat a big meal close to bedtime. If you are hungry, eat a light snack. · Do not drink a lot of water close to bedtime, because the need to urinate may wake you up during the night. · Do not read or watch TV in bed. Use the bed only for sleeping and sexual activity. What to try · Go to bed at the same time every night, and wake up at the same time every morning. Do not take naps during the day. · Keep your bedroom quiet, dark, and cool. · Get regular exercise, but not within 3 to 4 hours of your bedtime. Benedetta Ou · Sleep on a comfortable pillow and mattress. · If watching the clock makes you anxious, turn it facing away from you so you cannot see the time. · If you worry when you lie down, start a worry book. Well before bedtime, write down your worries, and then set the book and your concerns aside. · Try meditation or other relaxation techniques before you go to bed. · If you cannot fall asleep, get up and go to another room until you feel sleepy. Do something relaxing. Repeat your bedtime routine before you go to bed again. · Make your house quiet and calm about an hour before bedtime. Turn down the lights, turn off the TV, log off the computer, and turn down the volume on music. This can help you relax after a busy day. When should you call for help? Watch closely for changes in your health, and be sure to contact your doctor if: 
· Your efforts to improve your sleep do not work. · Your insomnia gets worse. · You fall asleep during the day. Where can you learn more? Go to Sinosun Technology.be Enter P513 in the search box to learn more about \"Insomnia: After Your Visit. \"  
© 6671-2159 Healthwise, Incorporated. Care instructions adapted under license by [facility] (which disclaims liability or warranty for this information). This care instruction is for use with your licensed healthcare professional. If you have questions about a medical condition or this instruction, always ask your healthcare professional. [address] any warranty or liability for your use of this information. Content Version: 4.0.87994; Last Revised: June 26, 2010 Drowsy Driving:  The [employer] cites drowsiness as a causing factor in more than 613,329 police reported crashes annually, resulting in 76,000 injuries and 1,500 deaths. Other surveys suggest 55% of people polled have driven while drowsy in the past year, 23% had fallen asleep but not crashed, 3% crashed, and 2% had and accident due to drowsy driving. Who is at risk? Young Drivers: One study of drowsy driving accidents states that 55% of the drivers were under 25 years. Of those, 75% were male. Shift Workers and Travelers: People who work overnight or travel across time zones frequently are at higher risk of experiencing Circadian Rhythm Disorders. They are trying to work and function when their body is programed to sleep. Sleep Deprived: Lack of sleep has a serious impact on your ability to pay attention or focus on a task. Consistently getting less than the average of 8 hours your body needs creates partial or cumulative sleep deprivation. Untreated Sleep Disorders: Sleep Apnea, Narcolepsy, R.L.S., and other sleep disorders (untreated) prevent a person from getting enough restful sleep. This leads to excessive daytime sleepiness and increases the risk for drowsy driving accidents by up to 7 times. Medications / Alcohol: Even over the counter medications can cause drowsiness. Medications that impair a drivers attention should have a warning label. Alcohol naturally makes you sleepy and on its own can cause accidents. Combined with excessive drowsiness its effects are amplified. Signs of Drowsy Driving: * You don't remember driving the last few miles * You may drift out of your neha * You are unable to focus and your thoughts wander * You may yawn more often than normal 
 * You have difficulty keeping your eyes open / nodding off * Missing traffic signs, speeding, or tailgating Prevention-  
Good sleep hygiene, lifestyle and behavioral choices have the most impact on drowsy driving.  There is no substitute for sleep and the average person requires 8 hours nightly. If you find yourself driving drowsy, stop and sleep. Consider the sleep hygiene tips provided during your visit as well. Medication Refill Policy: Refills for all medications require 1 week advance notice. Please have your pharmacy fax a refill request. We are unable to fax, or call in \"controled substance\" medications and you will need to pick these prescriptions up from our office. [employer] Activation Thank you for requesting access to [email or web address]. Please follow the instructions below to securely access and download your online medical record. [email or web address] allows you to send messages to your doctor, view your test results, renew your prescriptions, schedule appointments, and more. How Do I Sign Up? 1. In your internet browser, go to [email or web address]. 2. Click on the First Time User? Click Here link in the Sign In box. You will see the New Member Sign Up page. 3. Enter your [email or web address] Access Code exactly as it appears below. You will not need to use this code after youve completed the sign-up process. If you do not sign up before the expiration date, you must request a new code. [email or web address] Access Code: Activation code not generated Current [email or web address] Status: Active (This is the date your [email or web address] access code will ) 4. Enter the last four digits of your Social Security Number (xxxx) and Date of Birth (mm/dd/yyyy) as indicated and click Submit. You will be taken to the next sign-up page. 5. Create a [email or web address] ID. This will be your [email or web address] login ID and cannot be changed, so think of one that is secure and easy to remember. 6. Create a [email or web address] password. You can change your password at any time. 7. Enter your Password Reset Question and Answer. This can be used at a later time if you forget your password. 8. Enter your e-mail address. You will receive e-mail notification when new information is available in [address] 9. Click Sign Up. You can now view and download portions of your medical record. 10. Click the Download Summary menu link to download a portable copy of your medical information. Additional Information If you have questions, please call [phone]. Remember, [employer]k is NOT to be used for urgent needs. For medical emergencies, dial 911.

## 2020-04-29 NOTE — PROGRESS NOTES
[address] [facility] [address] [place]. [place], [address]  Tel.  [phone]  Fax. 100 [facility] 60  [place], [address] [facility]  Tel.  [phone]  Fax. [phone] [address] Gabriela Bentley  Tel.  [phone]  Fax. [phone]         Subjective:        Telemedicine visit performed with verbal consent of the patient. ID confirmed with date of birth and 's license provided by patient. Patient was seen at home  Eva Nevarez is a 76 y.o. female who was seen by synchronous (real-time) audio-video technology on 4/29/2020. Consent:  She and/or her healthcare decision maker is aware that this patient-initiated Telehealth encounter is the equivalent to a face to face encounter in the sleep disorder center and has provided verbal consent to proceed: Yes    I was at home while conducting this encounter. Eva Nevarez is an 76 y.o. female self-referred for evaluation for a sleep disorder. She complains of an isolated episode of sleep walking (she found a pan of fruit and her phone in a pan in the microwave) and early morning awakenings associated with frustration with her sleep qualtiy and feels her legs are restless in the morning. She denies any difficulty falling asleep at night. Her legs do not bother her at bedtime. She .  Symptoms began several months ago, unchanged since that time. She usually can fall asleep in 10-20 minutes. She denies falling asleep while at work, driving. Johnathan Rodríguez does wake up frequently at night. She is bothered by waking up too early and left unable to get back to sleep. She actually sleeps about 6 hours at night and wakes up about 3 times during the night. She does not work shifts: Mehrdad Marinelli indicates she does get too little sleep at night. Her bedtime is 2230. She awakens at 0430. She does not take naps.   . She has the following observed behaviors: Twitching of legs or feet, Sleepwalking, Kicking with legs;  . Other remarks:    She walks and does yoga  She is a counselor  [place] Sleepiness Score: 3     Allergies   Allergen Reactions    Ambien Cr [Zolpidem] Other (comments)     Agitation & mood lability; higher dosages only    Requip [Ropinirole] Nausea Only         Current Outpatient Medications:     clonazePAM (KlonoPIN) 0.5 mg tablet, 1 1/2 tab at bedtime as needed, Disp: 45 Tab, Rfl: 0    estradiol (VAGIFEM) 10 mcg tab vaginal tablet, Insert 10 mcg into vagina every Tuesday and Friday., Disp: , Rfl:     estradiol 0.025 mg/24 hr ptsw, Apply 1 Patch to affected area two (2) days a week., Disp: , Rfl:     progesterone (PROMETRIUM) 100 mg capsule, Take 100 mg by mouth daily. , Disp: , Rfl:     valACYclovir (VALTREX) 500 mg tablet, Take 1 Tab by mouth two (2) times a day. For 1-3 days as needed. , Disp: 30 Tab, Rfl: 3    citalopram (CELEXA) 10 mg tablet, Take 1 Tab by mouth two (2) times a day., Disp: 60 Tab, Rfl: 5    OTHER, Indications: Sleep support melatonin, Disp: , Rfl:     OTHER, hemagenics iron supplement once daily, Disp: , Rfl:     OTHER, boswellia herbal complex twice daily, Disp: , Rfl:     OTHER, Chinese herbs, Disp: , Rfl:    mirapex . 75 mg at 8 pm  She  has a past medical history of Arthritis, Celiac syndrome, Iron deficiency, and Restless legs. She  has a past surgical history that includes endoscopy, colon, diagnostic (2009 & 02/13); hx orthopaedic (1975); hx gyn (1988); and hx gyn (1990). She family history includes Arthritis-osteo in her father and paternal grandmother; Heart Failure in her father and mother; Hypertension in her mother; Kidney Disease in her mother; Stroke in her father; Thyroid Disease in her sister. She  reports that she has never smoked. She has never used smokeless tobacco. She reports current alcohol use of about 2.5 - 3.3 standard drinks of alcohol per week. She reports that she does not use drugs.      Review of Systems:  Constitutional:  No significant weight loss or weight gain  Eyes:  No blurred vision  CVS:  No significant chest pain  Pulm:  No significant shortness of breath  GI:  No significant nausea or vomiting  :  No significant nocturia  Musculoskeletal:  No significant joint pain at night  Skin:  No significant rashes  Neuro:  No significant dizziness   Psych:  No active mood issues    Sleep Review of Systems: notable for no difficulty falling asleep; +frequent awakenings at night;      Objective:     Visit Vitals  /70   Ht 5' 3.75\" (1.619 m)   Wt 108 lb (49 kg)   BMI 18.68 kg/m²         Vital Signs: (As obtained by patient/caregiver at home)        Constitutional: [x] Appears well-developed and well-nourished [x] No apparent distress      [] Abnormal -     Mental status: [x] Alert and awake  [x] Oriented to person/place/time [x] Able to follow commands    [] Abnormal -     Eyes:   EOM    [x]  Normal    [] Abnormal -   Sclera  [x]  Normal    [] Abnormal -          Discharge [x]  None visible   [] Abnormal -     HENT: [x] Normocephalic, atraumatic  [] Abnormal -                     External Ears [x] Normal  [] Abnormal -    Neck: [x] No visualized mass [] Abnormal -     Pulmonary/Chest: [x] Respiratory effort normal   [x] No visualized signs of difficulty breathing or respiratory distress        [] Abnormal -       Neurological:        [x] No Facial Asymmetry (Cranial nerve 7 motor function) (limited exam due to video visit)          [x] No gaze palsy        [] Abnormal -          Skin:        [x] No significant exanthematous lesions or discoloration noted on facial skin         [] Abnormal -            Psychiatric:       [x] Normal Affect [] Abnormal -       Other pertinent observable physical exam findings:-          Assessment:       ICD-10-CM ICD-9-CM    1. Restless legs syndrome G25.81 333.94    2. Insomnia, unspecified type G47.00 780.52    3.  Sleepwalking (somnambulism) F51.3 307.46          Plan:       * Restless legs syndrome - I have reviewed some aggravating factors for RLS with her.  she will reduce caffeine intake,   She has recently increased her dosage of mirapex to .75 mg. She will try taking that at 9 pm. Clonazepam dosage can remain at 0.5 mg. Stretching exercises advised    2. Insomnia - I have advised a regular sleep wake cycle. she  was advised to avoid looking at the clock during the night. Ideally, the clock face should be turned away. she was advised to minimize caffeine use and to avoid caffeine-containing beverages 8 hours prior to bedtime. A regular exercise schedule, at least 3 hours before bedtime, would be beneficial to improving sleep quality. she was advised to avoid evening light and to use sunglasses in the late afternoon. Watching TV, using laptops, tablets and smartphones in the evening was discouraged. she  was advised to keep the bedroom cool and dark. 3. Sleep walking - isolated episode, no further instances. We discussed risk factors. Attended sleep study if further episodes occur. She should ensure a safe sleeping environment  Should her sleep symptoms not improve, I recommend an attended polysomnogram for further evaluation . All of her questions were addressed. Pursuant to the emergency declaration under the [facility], [facility] waiver authority and the [employer] and [employer] Act, this Virtual  Visit was conducted, with patient's consent, to reduce the patient's risk of exposure to COVID-19 and provide continuity of care for an established patient. Services were provided through a video synchronous discussion virtually to substitute for in-person clinic visit.     Deanna Zamudio MD    Electronically signed by    Karen Morton MD  Diplomate in Sleep Medicine  [facility]

## 2020-06-07 DIAGNOSIS — F41.9 ANXIETY: ICD-10-CM

## 2020-06-07 DIAGNOSIS — G47.9 SLEEP DISORDER: ICD-10-CM

## 2020-06-08 RX ORDER — CLONAZEPAM 0.5 MG/1
TABLET ORAL
Qty: 45 TAB | Refills: 0 | Status: SHIPPED | OUTPATIENT
Start: 2020-06-08 | End: 2020-07-08 | Stop reason: SDUPTHER

## 2020-06-08 NOTE — TELEPHONE ENCOUNTER
Orders Placed This Encounter  clonazePAM (KlonoPIN) 0.5 mg tablet Sig: TAKE 1 AND 1/2 TABLETS BY MOUTH EVERY NIGHT AT BEDTIME AS NEEDED. Dispense:  45 Tab Refill:  0  reviewed 6/8/2020

## 2020-07-07 ENCOUNTER — TELEPHONE (OUTPATIENT)
Dept: INTERNAL MEDICINE CLINIC | Age: 65
End: 2020-07-07

## 2020-07-08 ENCOUNTER — OFFICE VISIT (OUTPATIENT)
Dept: INTERNAL MEDICINE CLINIC | Age: 65
End: 2020-07-08

## 2020-07-08 VITALS
SYSTOLIC BLOOD PRESSURE: 101 MMHG | RESPIRATION RATE: 18 BRPM | HEART RATE: 73 BPM | TEMPERATURE: 97.1 F | WEIGHT: 108 LBS | BODY MASS INDEX: 18.44 KG/M2 | DIASTOLIC BLOOD PRESSURE: 65 MMHG | OXYGEN SATURATION: 100 % | HEIGHT: 64 IN

## 2020-07-08 DIAGNOSIS — G25.81 RESTLESS LEGS SYNDROME: Primary | ICD-10-CM

## 2020-07-08 DIAGNOSIS — F41.9 ANXIETY: ICD-10-CM

## 2020-07-08 DIAGNOSIS — G47.9 SLEEP DISORDER: ICD-10-CM

## 2020-07-08 RX ORDER — PRAMIPEXOLE DIHYDROCHLORIDE 0.75 MG/1
0.75 TABLET ORAL 2 TIMES DAILY
Qty: 180 TAB | Refills: 1 | Status: SHIPPED | OUTPATIENT
Start: 2020-07-08 | End: 2020-10-26 | Stop reason: SDUPTHER

## 2020-07-08 RX ORDER — CLONAZEPAM 0.5 MG/1
TABLET ORAL
Qty: 45 TAB | Refills: 0 | Status: CANCELLED | OUTPATIENT
Start: 2020-07-08

## 2020-07-08 RX ORDER — CLONAZEPAM 0.5 MG/1
TABLET ORAL
Qty: 45 TAB | Refills: 1 | Status: SHIPPED | OUTPATIENT
Start: 2020-07-08 | End: 2020-10-07

## 2020-07-08 NOTE — PROGRESS NOTES
75 yo female in for 6 mo f/u and med refills. She takes meds for Restless Legs and Sleep problems; she also goes for accupunture periodically. She has some mild arthritis in one great toes. She has Celiac disease (as per labwork) which is controlled by diet. She sees her GYN of 7/21. She saw Dr. William Espinoza about the sleep disturbance, but no further f/u. She exercises regularly. She declines getting Shingrix or Flu vaccines. She should have a 6 mo \"screening\" Mammogram now, and will do this through her GYN provider. She has stopped taking her Celexa. PE: WNWD WF is alert   BP = 101/65   Heart - RRR   Lungs - clear   Abd - no M,T,O   LEs - no edema; DP pulses=2+    Imp: Sleep disturbance   Restless Legs    Plan: : Clonazepam last refilled: 6/8/2020 for #45   Meds refilled   See Dr. Helm Else in Oct as scheduled   No labs today as they were done in Feb  _______________________________  Expected course of current diagnosed problem(s) as well as expected progression and possible complications, and desired follow up with provider are discussed with patient. Patient is encouraged to be back in touch with any questions or concerns. Patient expresses understanding of plan of care. Patient is given AVS which includes diagnoses, current medications, vitals.

## 2020-08-20 ENCOUNTER — OFFICE VISIT (OUTPATIENT)
Dept: INTERNAL MEDICINE CLINIC | Age: 65
End: 2020-08-20
Payer: MEDICARE

## 2020-08-20 VITALS — SYSTOLIC BLOOD PRESSURE: 116 MMHG | DIASTOLIC BLOOD PRESSURE: 62 MMHG | TEMPERATURE: 97.8 F

## 2020-08-20 DIAGNOSIS — L25.5 RHUS DERMATITIS: Primary | ICD-10-CM

## 2020-08-20 PROCEDURE — 99213 OFFICE O/P EST LOW 20 MIN: CPT | Performed by: NURSE PRACTITIONER

## 2020-08-20 PROCEDURE — G8399 PT W/DXA RESULTS DOCUMENT: HCPCS | Performed by: NURSE PRACTITIONER

## 2020-08-20 PROCEDURE — 1090F PRES/ABSN URINE INCON ASSESS: CPT | Performed by: NURSE PRACTITIONER

## 2020-08-20 PROCEDURE — G8427 DOCREV CUR MEDS BY ELIG CLIN: HCPCS | Performed by: NURSE PRACTITIONER

## 2020-08-20 PROCEDURE — G9899 SCRN MAM PERF RSLTS DOC: HCPCS | Performed by: NURSE PRACTITIONER

## 2020-08-20 PROCEDURE — G0463 HOSPITAL OUTPT CLINIC VISIT: HCPCS | Performed by: NURSE PRACTITIONER

## 2020-08-20 PROCEDURE — G8420 CALC BMI NORM PARAMETERS: HCPCS | Performed by: NURSE PRACTITIONER

## 2020-08-20 PROCEDURE — 1101F PT FALLS ASSESS-DOCD LE1/YR: CPT | Performed by: NURSE PRACTITIONER

## 2020-08-20 PROCEDURE — G8536 NO DOC ELDER MAL SCRN: HCPCS | Performed by: NURSE PRACTITIONER

## 2020-08-20 PROCEDURE — G9717 DOC PT DX DEP/BP F/U NT REQ: HCPCS | Performed by: NURSE PRACTITIONER

## 2020-08-20 PROCEDURE — 3017F COLORECTAL CA SCREEN DOC REV: CPT | Performed by: NURSE PRACTITIONER

## 2020-08-20 RX ORDER — PREDNISONE 10 MG/1
TABLET ORAL
Qty: 30 TAB | Refills: 0 | Status: SHIPPED | OUTPATIENT
Start: 2020-08-20 | End: 2021-12-01

## 2020-08-20 RX ORDER — PREDNISONE 10 MG/1
TABLET ORAL
Qty: 30 TAB | Refills: 0 | Status: SHIPPED | OUTPATIENT
Start: 2020-08-20 | End: 2020-08-20 | Stop reason: SDUPTHER

## 2020-08-20 NOTE — PROGRESS NOTES
77 yo female got into poison ivy while doing yard work a few days ago. She within 24 hrs, she had vesicles w/ blebs especially on the R anterior thigh. She has used some cool compresses. PE: WNWD WF   Skin - linear areas of vesicles on RLE, RUE and to lesser extent on LUE    Imp: Rhus Dermatitis    Plan: Prednisone 40 mg taper   Zyrtec and Pepcid or Prilosec daily   Cool compresses   Will next see Dr. Kvng Bear 10/26/2020  _________________________  Expected course of current diagnosed problem(s) as well as expected progression and possible complications, and desired follow up with provider are discussed with patient. Patient is encouraged to be back in touch with any questions or concerns. Patient expresses understanding of plan of care. Patient is given AVS which includes diagnoses, current medications, vitals.

## 2020-08-20 NOTE — PATIENT INSTRUCTIONS
Poison [place], and [place]: Care Instructions  Your Care Instructions     Poison ivy, poison oak, and poison sumac are plants that can cause a skin rash upon contact. The red, itchy rash often shows up in lines or streaks and may cause fluid-filled blisters or large, raised hives. The rash is caused by an allergic reaction to an oil in poison ivy, oak, and sumac. The rash may occur when you touch the plant or when you touch clothing, pet fur, sporting gear, gardening tools, or other objects that have come in contact with one of these plants. You cannot catch or spread the rash, even if you touch it or the blister fluid, because the plant oil will already have been absorbed or washed off the skin. The rash may seem to be spreading, but either it is still developing from earlier contact or you have touched something that still has the plant oil on it. Follow-up care is a key part of your treatment and safety. Be sure to make and go to all appointments, and call your doctor if you are having problems. It's also a good idea to know your test results and keep a list of the medicines you take. How can you care for yourself at home? · If your doctor prescribed a cream, use it as directed. If your doctor prescribed medicine, take it exactly as prescribed. Call your doctor if you think you are having a problem with your medicine. · Use cold, wet cloths to reduce itching. · Keep cool, and stay out of the sun. · Leave the rash open to the air. · Wash all clothing or other things that may have come in contact with the plant oil. · Avoid most lotions and ointments until the rash heals. Calamine lotion may help relieve symptoms of a plant rash. Use it 3 or 4 times a day. To prevent poison ivy exposure  If you know that you will be near poison ivy, oak, or sumac, you can try these options:  · Use a product designed to help prevent plant oil from getting on the skin.  These products, such as Ivy X Pre-Contact Skin Solution, come in lotions, sprays, or towelettes. You put the product on your skin right before you go outdoors. · If you did not use a preventive product and you have had contact with plant oil, clean it off your skin as soon as possible. Use a product such as Tecnu Original Outdoor Skin Cleanser. These products can also be used to clean plant oil from clothing or tools. When should you call for help? Call your doctor now or seek immediate medical care if:  · Your rash gets worse, and you start to feel bad and have a fever, a stiff neck, nausea, and vomiting. · You have signs of infection, such as:  ? Increased pain, swelling, warmth, or redness. ? Red streaks leading from the rash. ? Pus draining from the rash. ? A fever. Watch closely for changes in your health, and be sure to contact your doctor if:  · You have new blisters or bruises, or the rash spreads and looks like a sunburn. · The rash gets worse, or it comes back after nearly disappearing. · You think a medicine you are using is making your rash worse. · Your rash does not clear up after 1 to 2 weeks of home treatment. · You have joint aches or body aches with your rash. Where can you learn more? Go to http://www.Pipefish.com/  Enter Y689 in the search box to learn more about \"Poison [place], [place], and [place]: Care Instructions. \"  Current as of: October 31, 2019               Content Version: 12.5  © 7737-5532 [employer]. Care instructions adapted under license by [employer] (which disclaims liability or warranty for this information). If you have questions about a medical condition or this instruction, always ask your healthcare professional. [place] [address] any warranty or liability for your use of this information.     ____________________________    Zyrtec/Ceterizine 10 mg once in PM   PLUS  Pepcid/Famotidine 20 mg OR Prilosec/Omeprazole 20 mg once daily (helps broaden the spectrum of coverage for the itching)

## 2020-08-21 ENCOUNTER — TELEPHONE (OUTPATIENT)
Dept: INTERNAL MEDICINE CLINIC | Age: 65
End: 2020-08-21

## 2020-08-21 NOTE — TELEPHONE ENCOUNTER
Pt decided she does not want to take oral prednisone because of the insomnia side effect. Asking if she can prescribe a topical steroid cream instead please.

## 2020-08-23 RX ORDER — TRIAMCINOLONE ACETONIDE 1 MG/G
CREAM TOPICAL 2 TIMES DAILY
Qty: 30 G | Refills: 0 | Status: SHIPPED | OUTPATIENT
Start: 2020-08-23

## 2020-08-24 NOTE — TELEPHONE ENCOUNTER
Called patient to advise that cream has been sent into pharmacy for her. Patient reports that she has started taking the oral steroids. Per Dr Mason Ferrera she can not use both.  Patient will continue oral medication for now

## 2020-10-07 DIAGNOSIS — F41.9 ANXIETY: ICD-10-CM

## 2020-10-07 DIAGNOSIS — G47.9 SLEEP DISORDER: ICD-10-CM

## 2020-10-08 RX ORDER — CLONAZEPAM 0.5 MG/1
TABLET ORAL
Qty: 45 TAB | Refills: 0 | Status: SHIPPED | OUTPATIENT
Start: 2020-10-08 | End: 2020-11-20

## 2020-10-09 NOTE — TELEPHONE ENCOUNTER
Orders Placed This Encounter  clonazePAM (KlonoPIN) 0.5 mg tablet Sig: TAKE 1 AND 1/2 TABLETS BY MOUTH EVERY NIGHT AT BEDTIME AS NEEDED. Dispense:  45 Tab Refill:  0  reviewed 10/8/2020

## 2020-10-26 ENCOUNTER — OFFICE VISIT (OUTPATIENT)
Dept: INTERNAL MEDICINE CLINIC | Age: 65
End: 2020-10-26
Payer: MEDICARE

## 2020-10-26 VITALS
SYSTOLIC BLOOD PRESSURE: 107 MMHG | WEIGHT: 110.4 LBS | TEMPERATURE: 98 F | OXYGEN SATURATION: 99 % | DIASTOLIC BLOOD PRESSURE: 72 MMHG | BODY MASS INDEX: 18.85 KG/M2 | HEART RATE: 66 BPM | HEIGHT: 64 IN | RESPIRATION RATE: 16 BRPM

## 2020-10-26 DIAGNOSIS — E78.2 MIXED HYPERLIPIDEMIA: ICD-10-CM

## 2020-10-26 DIAGNOSIS — F33.8 SEASONAL AFFECTIVE DISORDER (HCC): ICD-10-CM

## 2020-10-26 DIAGNOSIS — Z79.899 ENCOUNTER FOR LONG-TERM (CURRENT) USE OF MEDICATIONS: ICD-10-CM

## 2020-10-26 DIAGNOSIS — Z13.31 SCREENING FOR DEPRESSION: ICD-10-CM

## 2020-10-26 DIAGNOSIS — Z00.00 MEDICARE ANNUAL WELLNESS VISIT, SUBSEQUENT: Primary | ICD-10-CM

## 2020-10-26 DIAGNOSIS — Z86.2 HISTORY OF ANEMIA: ICD-10-CM

## 2020-10-26 DIAGNOSIS — G47.9 SLEEP DISORDER: ICD-10-CM

## 2020-10-26 DIAGNOSIS — G25.81 RESTLESS LEG: ICD-10-CM

## 2020-10-26 DIAGNOSIS — G25.81 RESTLESS LEGS SYNDROME: ICD-10-CM

## 2020-10-26 PROCEDURE — G0463 HOSPITAL OUTPT CLINIC VISIT: HCPCS | Performed by: INTERNAL MEDICINE

## 2020-10-26 PROCEDURE — 3017F COLORECTAL CA SCREEN DOC REV: CPT | Performed by: INTERNAL MEDICINE

## 2020-10-26 PROCEDURE — 99213 OFFICE O/P EST LOW 20 MIN: CPT | Performed by: INTERNAL MEDICINE

## 2020-10-26 PROCEDURE — G9717 DOC PT DX DEP/BP F/U NT REQ: HCPCS | Performed by: INTERNAL MEDICINE

## 2020-10-26 PROCEDURE — G8536 NO DOC ELDER MAL SCRN: HCPCS | Performed by: INTERNAL MEDICINE

## 2020-10-26 PROCEDURE — G8420 CALC BMI NORM PARAMETERS: HCPCS | Performed by: INTERNAL MEDICINE

## 2020-10-26 PROCEDURE — G8399 PT W/DXA RESULTS DOCUMENT: HCPCS | Performed by: INTERNAL MEDICINE

## 2020-10-26 PROCEDURE — 1090F PRES/ABSN URINE INCON ASSESS: CPT | Performed by: INTERNAL MEDICINE

## 2020-10-26 PROCEDURE — G0439 PPPS, SUBSEQ VISIT: HCPCS | Performed by: INTERNAL MEDICINE

## 2020-10-26 PROCEDURE — G8427 DOCREV CUR MEDS BY ELIG CLIN: HCPCS | Performed by: INTERNAL MEDICINE

## 2020-10-26 PROCEDURE — 1101F PT FALLS ASSESS-DOCD LE1/YR: CPT | Performed by: INTERNAL MEDICINE

## 2020-10-26 PROCEDURE — G9899 SCRN MAM PERF RSLTS DOC: HCPCS | Performed by: INTERNAL MEDICINE

## 2020-10-26 RX ORDER — CITALOPRAM 10 MG/1
10 TABLET ORAL 2 TIMES DAILY
Qty: 60 TAB | Refills: 5 | Status: SHIPPED | OUTPATIENT
Start: 2020-10-26 | End: 2021-12-01 | Stop reason: ALTCHOICE

## 2020-10-26 RX ORDER — PRAMIPEXOLE DIHYDROCHLORIDE 0.75 MG/1
0.75 TABLET ORAL DAILY
Qty: 90 TAB | Refills: 1 | Status: SHIPPED | OUTPATIENT
Start: 2020-10-26 | End: 2021-06-24

## 2020-10-26 NOTE — PROGRESS NOTES
Subjective:      Brittaney Mancini is a 76 y.o. female who presents today for follow up of her restless leg syndrome, sleep disturbance and mild depression. She is also due for her Medicare Wellness Exam     She needs a refill of her celexa. She is taking 15mg to 20mg daily. She takes this medication for seasonal affective disorder. She denies any chest pain, shortness of breath, syncope, headaches, nausea/vomiting, or any bowel changes. Due for:  -eye exam - due at this time. -pneumonia vaccine - declines, declines flu, declines shingles.   -fasting labs    Patient Active Problem List   Diagnosis Code    Restless legs syndrome G25.81    Sleep disorder G47.9    Osteopenia M85.80    Celiac disease K90.0    Iron deficiency E61.1    Pap smear for cervical cancer screening Z12.4    History of screening mammography Z92.89    History of bone density study Z92.89    Colon cancer screening Z12.11    Herpetic lesion B00.9    Lumbar disc disease with radiculopathy M51.16    Depression with anxiety F41.8    Advance directive discussed with patient Z71.89    Mild depression (HCC) F32.0     Current Outpatient Medications   Medication Sig Dispense Refill    citalopram (CELEXA) 10 mg tablet Take 1 Tab by mouth two (2) times a day. 60 Tab 5    pramipexole (Mirapex) 0.75 mg tablet Take 1 Tab by mouth daily. 90 Tab 1    clonazePAM (KlonoPIN) 0.5 mg tablet TAKE 1 AND 1/2 TABLETS BY MOUTH EVERY NIGHT AT BEDTIME AS NEEDED. 45 Tab 0    OTHER boswellia herbal complex twice daily      OTHER Chinese herbs      estradiol (VAGIFEM) 10 mcg tab vaginal tablet Insert 10 mcg into vagina every Tuesday and Friday.  estradiol 0.025 mg/24 hr ptsw Apply 1 Patch to affected area two (2) days a week.  progesterone (PROMETRIUM) 100 mg capsule Take 100 mg by mouth daily.  valACYclovir (VALTREX) 500 mg tablet Take 1 Tab by mouth two (2) times a day. For 1-3 days as needed.  30 Tab 3    triamcinolone acetonide (KENALOG) 0.1 % topical cream Apply  to affected area two (2) times a day. use thin layer 30 g 0    predniSONE (DELTASONE) 10 mg tablet 4 pills daily in AM w/food for 3 days, then 3 pills daily for 3 days, then 2 pills daily for 3 days, then 1 daily 30 Tab 0        Review of Systems    Pertinent items are noted in HPI. Objective: Wt Readings from Last 3 Encounters:   07/08/20 108 lb (49 kg)   04/29/20 108 lb (49 kg)   01/29/20 102 lb (46.3 kg)     BP Readings from Last 3 Encounters:   08/20/20 116/62   07/08/20 101/65   04/29/20 100/70     Visit Vitals  /72   Pulse 66   Temp 98 °F (36.7 °C) (Temporal)   Resp 16   Ht 5' 3.75\" (1.619 m)   Wt 110 lb 6.4 oz (50.1 kg)   SpO2 99%   BMI 19.10 kg/m²     General appearance: alert, cooperative, no distress, appears stated age  Head: Normocephalic, without obvious abnormality, atraumatic  Neck: supple, symmetrical, trachea midline, no adenopathy, no carotid bruit and no JVD  Lungs: clear to auscultation bilaterally  Heart: regular rate and rhythm, S1, S2 normal, no murmur, click, rub or gallop  Abdomen: soft, non-tender. Bowel sounds normal. No masses,  no organomegaly  Extremities: extremities normal, atraumatic, no cyanosis or edema    Assessment/Plan:     1. Sleep disorder  -using clonazepam prn    2. Mixed hyperlipidemia  -due for fasting lipid panel  at this time. - CBC WITH AUTOMATED DIFF  - METABOLIC PANEL, COMPREHENSIVE  - LIPID PANEL    3. Restless leg  -taking mirapex 0.75mg nightly. Refilled today. - URINALYSIS W/ RFLX MICROSCOPIC    4. Seasonal affective disorder [facility])  -will be restarting her celexa again for winter. Refilled today.     - TSH 3RD GENERATION    5. Encounter for long-term (current) use of medications      6. Medicare annual wellness visit, subsequent  -completed today    - [place] [id number]    7. Screening for depression    - DEPRESSION SCREEN ANNUAL    8. History of anemia  -check iron level.   Also needed for restless leg evaluation  - IRON PROFILE    9. Restless legs syndrome    - pramipexole (Mirapex) 0.75 mg tablet; Take 1 Tab by mouth daily. Dispense: 90 Tab; Refill: 1     Follow-up Disposition:       Follow up in 6 months   Return if symptoms worsen or fail to improve. Advised patient to call back or return to office if symptoms worsen/change/persist.     Discussed expected course/resolution/complications of diagnosis in detail with patient. Medication risks/benefits/costs/interactions/alternatives discussed with patient. Patient was given an after visit summary which includes diagnoses, current medications, & vitals. Patient expressed understanding with the diagnosis and plan. This is the Subsequent Medicare Annual Wellness Exam, performed 12 months or more after the Initial AWV or the last Subsequent AWV    I have reviewed the patient's medical history in detail and updated the computerized patient record.      History     Patient Active Problem List   Diagnosis Code    Restless legs syndrome G25.81    Sleep disorder G47.9    Osteopenia M85.80    Celiac disease K90.0    Iron deficiency E61.1    Pap smear for cervical cancer screening Z12.4    History of screening mammography Z92.89    History of bone density study Z92.89    Colon cancer screening Z12.11    Herpetic lesion B00.9    Lumbar disc disease with radiculopathy M51.16    Depression with anxiety F41.8    Advance directive discussed with patient Z71.89    Mild depression ([address] F32.0     Past Medical History:   Diagnosis Date    Arthritis     Left great toe, R knee, L thumb    Celiac syndrome     by blood testing    Iron deficiency     Restless legs       Past Surgical History:   Procedure Laterality Date    ENDOSCOPY, COLON, DIAGNOSTIC   &     benign sessile polyp in  - repeat in 5 years    HX GYN      lap for blocked fallopian tube    HX GYN           HX ORTHOPAEDIC      R knee menisectomy     Current Outpatient Medications   Medication Sig Dispense Refill    clonazePAM (KlonoPIN) 0.5 mg tablet TAKE 1 AND 1/2 TABLETS BY MOUTH EVERY NIGHT AT BEDTIME AS NEEDED. 45 Tab 0    triamcinolone acetonide (KENALOG) 0.1 % topical cream Apply  to affected area two (2) times a day. use thin layer 30 g 0    predniSONE (DELTASONE) 10 mg tablet 4 pills daily in AM w/food for 3 days, then 3 pills daily for 3 days, then 2 pills daily for 3 days, then 1 daily 30 Tab 0    pramipexole (Mirapex) 0.75 mg tablet Take 1 Tab by mouth two (2) times a day. 180 Tab 1    OTHER boswellia herbal complex twice daily      OTHER Chinese herbs      estradiol (VAGIFEM) 10 mcg tab vaginal tablet Insert 10 mcg into vagina every Tuesday and Friday.  estradiol 0.025 mg/24 hr ptsw Apply 1 Patch to affected area two (2) days a week.  progesterone (PROMETRIUM) 100 mg capsule Take 100 mg by mouth daily.  valACYclovir (VALTREX) 500 mg tablet Take 1 Tab by mouth two (2) times a day. For 1-3 days as needed. 30 Tab 3     Allergies   Allergen Reactions    Ambien Cr [Zolpidem] Other (comments)     Agitation & mood lability; higher dosages only    Requip [Ropinirole] Nausea Only       Family History   Problem Relation Age of Onset    Kidney Disease Mother     Hypertension Mother     Heart Failure Mother     Stroke Father     Arthritis-osteo Father     Heart Failure Father     Thyroid Disease Sister     Arthritis-osteo Paternal Grandmother      Social History     Tobacco Use    Smoking status: Never Smoker    Smokeless tobacco: Never Used   Substance Use Topics    Alcohol use:  Yes     Alcohol/week: 2.5 - 3.3 standard drinks     Types: 3 - 4 Glasses of wine per week     Comment: Rarely       Depression Risk Factor Screening:     3 most recent PHQ Screens 6/19/2019   PHQ Not Done Active Diagnosis of Depression or Bipolar Disorder   Little interest or pleasure in doing things Not at all   Feeling down, depressed, irritable, or hopeless Several days   Total Score PHQ 2 1       Alcohol Risk Screen   Do you average more than 1 drink per night or more than 7 drinks a week:  No    On any one occasion in the past three months have you have had more than 3 drinks containing alcohol:  No        Functional Ability and Level of Safety:   Hearing: Hearing is good. Activities of Daily Living: The home contains: no safety equipment. Patient does total self care     Ambulation: with no difficulty     Fall Risk:  Fall Risk Assessment, last 12 mths 7/8/2020   Able to walk? Yes   Fall in past 12 months? No     Abuse Screen:  Patient is not abused       Cognitive Screening   Has your family/caregiver stated any concerns about your memory: no         Patient Care Team   Patient Care Team:  Avelino Bergeron MD as PCP - General (Internal Medicine)  Avelino Bergeron MD as PCP - [facility] Lien Harrison Provider    Assessment/Plan   Education and counseling provided:  Are appropriate based on today's review and evaluation    Diagnoses and all orders for this visit:    1. Medicare annual wellness visit, subsequent  -     [address]    2. Sleep disorder    3. Mixed hyperlipidemia    4. Restless leg    5. Mild depression ([facility] [address]    6. Encounter for long-term (current) use of medications    7.  Screening for depression  -     [address]

## 2020-10-26 NOTE — PATIENT INSTRUCTIONS
Medicare Wellness Visit, Female The best way to live healthy is to have a lifestyle where you eat a well-balanced diet, exercise regularly, limit alcohol use, and quit all forms of tobacco/nicotine, if applicable. Regular preventive services are another way to keep healthy. Preventive services (vaccines, screening tests, monitoring & exams) can help personalize your care plan, which helps you manage your own care. Screening tests can find health problems at the earliest stages, when they are easiest to treat. [facility] follows the current, evidence-based guidelines published by the [facility] Migel Kinsey ([facility]) when recommending preventive services for our patients. Because we follow these guidelines, sometimes recommendations change over time as research supports it. (For example, mammograms used to be recommended annually. Even though Medicare will still pay for an annual mammogram, the newer guidelines recommend a mammogram every two years for women of average risk). Of course, you and your doctor may decide to screen more often for some diseases, based on your risk and your co-morbidities (chronic disease you are already diagnosed with). Preventive services for you include: - Medicare offers their members a free annual wellness visit, which is time for you and your primary care provider to discuss and plan for your preventive service needs. Take advantage of this benefit every year! 
-All adults over the age of 72 should receive the recommended pneumonia vaccines. Current USPSTF guidelines recommend a series of two vaccines for the best pneumonia protection.  
-All adults should have a flu vaccine yearly and a tetanus vaccine every 10 years.  
-All adults age 48 and older should receive the shingles vaccines (series of two vaccines). -All adults age 38-68 who are overweight should have a diabetes screening test once every three years. -All adults born between [birth year] and 1966 should be screened once for Hepatitis C. 
-Other screening tests and preventive services for persons with diabetes include: an eye exam to screen for diabetic retinopathy, a kidney function test, a foot exam, and stricter control over your cholesterol.  
-Cardiovascular screening for adults with routine risk involves an electrocardiogram (ECG) at intervals determined by your doctor.  
-Colorectal cancer screenings should be done for adults age 54-65 with no increased risk factors for colorectal cancer. There are a number of acceptable methods of screening for this type of cancer. Each test has its own benefits and drawbacks. Discuss with your doctor what is most appropriate for you during your annual wellness visit. The different tests include: colonoscopy (considered the best screening method), a fecal occult blood test, a fecal DNA test, and sigmoidoscopy. 
 
-A bone mass density test is recommended when a woman turns 65 to screen for osteoporosis. This test is only recommended one time, as a screening. Some providers will use this same test as a disease monitoring tool if you already have osteoporosis. -Breast cancer screenings are recommended every other year for women of normal risk, age 54-69. 
-Cervical cancer screenings for women over age 72 are only recommended with certain risk factors.

## 2020-11-09 ENCOUNTER — HOSPITAL ENCOUNTER (OUTPATIENT)
Dept: LAB | Age: 65
Discharge: HOME | End: 2020-11-09
Payer: MEDICARE

## 2020-11-09 PROCEDURE — 84443 ASSAY THYROID STIM HORMONE: CPT

## 2020-11-09 PROCEDURE — 85025 COMPLETE CBC W/AUTO DIFF WBC: CPT

## 2020-11-09 PROCEDURE — 81003 URINALYSIS AUTO W/O SCOPE: CPT

## 2020-11-09 PROCEDURE — 83550 IRON BINDING TEST: CPT

## 2020-11-09 PROCEDURE — 80061 LIPID PANEL: CPT

## 2020-11-09 PROCEDURE — 36415 COLL VENOUS BLD VENIPUNCTURE: CPT

## 2020-11-09 PROCEDURE — 80053 COMPREHEN METABOLIC PANEL: CPT

## 2020-11-10 LAB
ALBUMIN SERPL-MCNC: 4.8 G/DL (ref 3.8–4.8)
ALBUMIN/GLOB SERPL: 2 {RATIO} (ref 1.2–2.2)
ALP SERPL-CCNC: 64 IU/L (ref 39–117)
ALT SERPL-CCNC: 14 IU/L (ref 0–32)
APPEARANCE UR: CLEAR
AST SERPL-CCNC: 28 IU/L (ref 0–40)
BASOPHILS # BLD AUTO: 0.1 X10E3/UL (ref 0–0.2)
BASOPHILS NFR BLD AUTO: 1 %
BILIRUB SERPL-MCNC: 0.8 MG/DL (ref 0–1.2)
BILIRUB UR QL STRIP: NEGATIVE
BUN SERPL-MCNC: 14 MG/DL (ref 8–27)
BUN/CREAT SERPL: 22 (ref 12–28)
CALCIUM SERPL-MCNC: 9.8 MG/DL (ref 8.7–10.3)
CHLORIDE SERPL-SCNC: 98 MMOL/L (ref 96–106)
CHOLEST SERPL-MCNC: 224 MG/DL (ref 100–199)
CO2 SERPL-SCNC: 27 MMOL/L (ref 20–29)
COLOR UR: YELLOW
CREAT SERPL-MCNC: 0.65 MG/DL (ref 0.57–1)
EOSINOPHIL # BLD AUTO: 0.5 X10E3/UL (ref 0–0.4)
EOSINOPHIL NFR BLD AUTO: 9 %
ERYTHROCYTE [DISTWIDTH] IN BLOOD BY AUTOMATED COUNT: 11.8 % (ref 11.7–15.4)
GLOBULIN SER CALC-MCNC: 2.4 G/DL (ref 1.5–4.5)
GLUCOSE SERPL-MCNC: 98 MG/DL (ref 65–99)
GLUCOSE UR QL: NEGATIVE
HCT VFR BLD AUTO: 37.1 % (ref 34–46.6)
HDLC SERPL-MCNC: 116 MG/DL
HGB BLD-MCNC: 12.4 G/DL (ref 11.1–15.9)
HGB UR QL STRIP: NEGATIVE
IMM GRANULOCYTES # BLD AUTO: 0 X10E3/UL (ref 0–0.1)
IMM GRANULOCYTES NFR BLD AUTO: 0 %
IRON SATN MFR SERPL: 42 % (ref 15–55)
IRON SERPL-MCNC: 150 UG/DL (ref 27–139)
KETONES UR QL STRIP: NEGATIVE
LDLC SERPL CALC-MCNC: 101 MG/DL (ref 0–99)
LEUKOCYTE ESTERASE UR QL STRIP: NEGATIVE
LYMPHOCYTES # BLD AUTO: 2.2 X10E3/UL (ref 0.7–3.1)
LYMPHOCYTES NFR BLD AUTO: 37 %
MCH RBC QN AUTO: 31.8 PG (ref 26.6–33)
MCHC RBC AUTO-ENTMCNC: 33.4 G/DL (ref 31.5–35.7)
MCV RBC AUTO: 95 FL (ref 79–97)
MICRO URNS: NORMAL
MONOCYTES # BLD AUTO: 0.5 X10E3/UL (ref 0.1–0.9)
MONOCYTES NFR BLD AUTO: 9 %
NEUTROPHILS # BLD AUTO: 2.7 X10E3/UL (ref 1.4–7)
NEUTROPHILS NFR BLD AUTO: 44 %
NITRITE UR QL STRIP: NEGATIVE
PH UR STRIP: 6 [PH] (ref 5–7.5)
PLATELET # BLD AUTO: 231 X10E3/UL (ref 150–450)
POTASSIUM SERPL-SCNC: 4.4 MMOL/L (ref 3.5–5.2)
PROT SERPL-MCNC: 7.2 G/DL (ref 6–8.5)
PROT UR QL STRIP: NEGATIVE
RBC # BLD AUTO: 3.9 X10E6/UL (ref 3.77–5.28)
SODIUM SERPL-SCNC: 137 MMOL/L (ref 134–144)
SP GR UR: 1.01 (ref 1–1.03)
TIBC SERPL-MCNC: 361 UG/DL (ref 250–450)
TRIGL SERPL-MCNC: 37 MG/DL (ref 0–149)
TSH SERPL DL<=0.005 MIU/L-ACNC: 3.05 UIU/ML (ref 0.45–4.5)
UIBC SERPL-MCNC: 211 UG/DL (ref 118–369)
UROBILINOGEN UR STRIP-MCNC: 0.2 MG/DL (ref 0.2–1)
VLDLC SERPL CALC-MCNC: 7 MG/DL (ref 5–40)
WBC # BLD AUTO: 5.9 X10E3/UL (ref 3.4–10.8)

## 2020-11-20 DIAGNOSIS — G47.9 SLEEP DISORDER: ICD-10-CM

## 2020-11-20 DIAGNOSIS — F41.9 ANXIETY: ICD-10-CM

## 2020-11-20 RX ORDER — CLONAZEPAM 0.5 MG/1
TABLET ORAL
Qty: 45 TAB | Refills: 0 | Status: SHIPPED | OUTPATIENT
Start: 2020-11-20 | End: 2021-01-08

## 2020-11-20 NOTE — TELEPHONE ENCOUNTER
Orders Placed This Encounter  clonazePAM (KlonoPIN) 0.5 mg tablet Sig: TAKE 1 AND 1/2 TABLETS BY MOUTH EVERY NIGHT AT BEDTIME AS NEEDED. Dispense:  45 Tab Refill:  0  reviewed 11/20/2020

## 2021-01-08 DIAGNOSIS — F41.9 ANXIETY: ICD-10-CM

## 2021-01-08 DIAGNOSIS — G47.9 SLEEP DISORDER: ICD-10-CM

## 2021-01-08 RX ORDER — CLONAZEPAM 0.5 MG/1
TABLET ORAL
Qty: 45 TAB | Refills: 0 | Status: SHIPPED | OUTPATIENT
Start: 2021-01-08 | End: 2021-02-19

## 2021-01-08 NOTE — TELEPHONE ENCOUNTER
Orders Placed This Encounter  clonazePAM (KlonoPIN) 0.5 mg tablet Sig: TAKE 1 AND 1/2 TABLETS BY MOUTH EVERY NIGHT AT BEDTIME AS NEEDED. Dispense:  45 Tab Refill:  0  reviewed 1/8/2021

## 2021-01-27 ENCOUNTER — TRANSCRIBE ORDER (OUTPATIENT)
Dept: SCHEDULING | Age: 66
End: 2021-01-27

## 2021-01-27 DIAGNOSIS — Z12.31 VISIT FOR SCREENING MAMMOGRAM: Primary | ICD-10-CM

## 2021-02-10 ENCOUNTER — HOSPITAL ENCOUNTER (OUTPATIENT)
Dept: MAMMOGRAPHY | Age: 66
Discharge: HOME | End: 2021-02-10
Payer: MEDICARE

## 2021-02-10 DIAGNOSIS — Z12.31 VISIT FOR SCREENING MAMMOGRAM: ICD-10-CM

## 2021-02-10 PROCEDURE — 77063 BREAST TOMOSYNTHESIS BI: CPT

## 2021-02-19 DIAGNOSIS — F41.9 ANXIETY: ICD-10-CM

## 2021-02-19 DIAGNOSIS — G47.9 SLEEP DISORDER: ICD-10-CM

## 2021-02-19 RX ORDER — CLONAZEPAM 0.5 MG/1
TABLET ORAL
Qty: 45 TAB | Refills: 0 | Status: SHIPPED | OUTPATIENT
Start: 2021-02-19 | End: 2021-03-29

## 2021-02-19 NOTE — TELEPHONE ENCOUNTER
Orders Placed This Encounter  clonazePAM (KlonoPIN) 0.5 mg tablet Sig: TAKE 1 AND 1/2 TABLETS BY MOUTH EVERY NIGHT AT BEDTIME AS NEEDED. Dispense:  45 Tab Refill:  0  reviewed 2/19/2021

## 2021-03-29 DIAGNOSIS — G47.9 SLEEP DISORDER: ICD-10-CM

## 2021-03-29 DIAGNOSIS — F41.9 ANXIETY: ICD-10-CM

## 2021-03-29 RX ORDER — CLONAZEPAM 0.5 MG/1
TABLET ORAL
Qty: 45 TAB | Refills: 0 | Status: SHIPPED | OUTPATIENT
Start: 2021-03-29 | End: 2021-05-16

## 2021-03-29 NOTE — TELEPHONE ENCOUNTER
Orders Placed This Encounter  clonazePAM (KlonoPIN) 0.5 mg tablet Sig: TAKE 1 AND 1/2 TABLETS BY MOUTH EVERY NIGHT AT BEDTIME AS NEEDED. Dispense:  45 Tab Refill:  0  reviewed 3/29/2021

## 2021-06-01 ENCOUNTER — OFFICE VISIT (OUTPATIENT)
Dept: INTERNAL MEDICINE CLINIC | Age: 66
End: 2021-06-01
Payer: MEDICARE

## 2021-06-01 VITALS
OXYGEN SATURATION: 100 % | RESPIRATION RATE: 16 BRPM | WEIGHT: 109.6 LBS | TEMPERATURE: 97.5 F | HEIGHT: 64 IN | BODY MASS INDEX: 18.71 KG/M2 | HEART RATE: 82 BPM | SYSTOLIC BLOOD PRESSURE: 101 MMHG | DIASTOLIC BLOOD PRESSURE: 66 MMHG

## 2021-06-01 DIAGNOSIS — G47.9 SLEEP DISTURBANCE: ICD-10-CM

## 2021-06-01 DIAGNOSIS — F33.8 SEASONAL AFFECTIVE DISORDER (HCC): ICD-10-CM

## 2021-06-01 DIAGNOSIS — Z79.899 ENCOUNTER FOR LONG-TERM (CURRENT) USE OF MEDICATIONS: ICD-10-CM

## 2021-06-01 DIAGNOSIS — G25.81 RESTLESS LEG: Primary | ICD-10-CM

## 2021-06-01 DIAGNOSIS — M81.0 AGE-RELATED OSTEOPOROSIS WITHOUT CURRENT PATHOLOGICAL FRACTURE: ICD-10-CM

## 2021-06-01 PROCEDURE — G9717 DOC PT DX DEP/BP F/U NT REQ: HCPCS | Performed by: INTERNAL MEDICINE

## 2021-06-01 PROCEDURE — 3017F COLORECTAL CA SCREEN DOC REV: CPT | Performed by: INTERNAL MEDICINE

## 2021-06-01 PROCEDURE — 1090F PRES/ABSN URINE INCON ASSESS: CPT | Performed by: INTERNAL MEDICINE

## 2021-06-01 PROCEDURE — G8536 NO DOC ELDER MAL SCRN: HCPCS | Performed by: INTERNAL MEDICINE

## 2021-06-01 PROCEDURE — 1101F PT FALLS ASSESS-DOCD LE1/YR: CPT | Performed by: INTERNAL MEDICINE

## 2021-06-01 PROCEDURE — 99214 OFFICE O/P EST MOD 30 MIN: CPT | Performed by: INTERNAL MEDICINE

## 2021-06-01 PROCEDURE — G0463 HOSPITAL OUTPT CLINIC VISIT: HCPCS | Performed by: INTERNAL MEDICINE

## 2021-06-01 PROCEDURE — G9899 SCRN MAM PERF RSLTS DOC: HCPCS | Performed by: INTERNAL MEDICINE

## 2021-06-01 PROCEDURE — G8427 DOCREV CUR MEDS BY ELIG CLIN: HCPCS | Performed by: INTERNAL MEDICINE

## 2021-06-01 PROCEDURE — G8420 CALC BMI NORM PARAMETERS: HCPCS | Performed by: INTERNAL MEDICINE

## 2021-06-01 RX ORDER — ALENDRONATE SODIUM 70 MG/1
70 TABLET ORAL
Qty: 12 TABLET | Refills: 3 | Status: SHIPPED | OUTPATIENT
Start: 2021-06-01 | End: 2021-09-03 | Stop reason: SDUPTHER

## 2021-06-01 RX ORDER — GABAPENTIN 100 MG/1
CAPSULE ORAL
Qty: 60 CAPSULE | Refills: 0 | Status: SHIPPED | OUTPATIENT
Start: 2021-06-01 | End: 2021-12-01

## 2021-06-01 NOTE — PROGRESS NOTES
Assessment/Plan:     1. Restless leg  -mirapex is not controlling symptoms. Only getting about 6 hours sleep per night.  -will d/c mirapex and give trial of gabapentin , recommended starting gabapentin 100mg nightly for a week, may increase to 200mg nightly if needed . Patient to report progress via [employer] in the next couple of weeks. - gabapentin (NEURONTIN) 100 mg capsule; 1-2 tablets at bedtime. Dispense: 60 Capsule; Refill: 0    2. Seasonal affective disorder ([facility] [address]  -no use of SSRI. Will continue to monitor. Very stable off of medications. 3. Sleep disturbance  -may continue to use clonazepam nightly prn  -monitoring     4. Encounter for long-term (current) use of medications      5. Age-related osteoporosis without current pathological fracture  -last DXA showed osteoporosis. - done 20.  -discussed with patient benefits and goals of treatment for osteoporosis. She did not start bisphosphonate at that time. She would like to start use now. -will initiate fosamax 70mg weekly. Patient instructed on proper use - take on empty stomach once weekly. Need to stay upright for at least 30 minutes after taking medication. Also need to refrain from eating or drinking anything other than water for 30 minutes after use. Do not take any other medications or supplements for 30 minutes after use. Orders Placed This Encounter    gabapentin (NEURONTIN) 100 mg capsule     Si-2 tablets at bedtime. Dispense:  60 Capsule     Refill:  0    alendronate (FOSAMAX) 70 mg tablet     Sig: Take 1 Tablet by mouth every seven (7) days. Dispense:  12 Tablet     Refill:  3      On this 2021 I have spent 30 minutes reviewing previous notes, test results and face to face with the patient discussing the diagnosis and importance of compliance with the treatment plan as well as documenting on the day of the visit.             Follow-up Disposition:     Follow up in 6 months         Disclaimer:  Return if symptoms worsen or fail to improve. Advised patient to call back or return to office if symptoms worsen/change/persist.     Discussed expected course/resolution/complications of diagnosis in detail with patient. Medication risks/benefits/costs/interactions/alternatives discussed with patient. Patient was given an after visit summary which includes diagnoses, current medications, & vitals. Patient expressed understanding with the diagnosis and plan. Subjective:      Karey Sosa is a 78 y.o. female who presents today for follow up of her restless leg syndrome, sleep disturbance and mild depression - seasonal affective disorder. Since last visit:  -using using clonazepam and mirapex - getting 6 hours of sleep. Still getting up in the middle of the   -she stopped the celexa since December, 2020. Has been doing ok without it. Her depression has been controlled. She also adopted a puppy    -colonoscopy done with Dr. Ricky Etienne. - 3/2021. Patient Care Team:  -Dr. Ricky Etienne - GI      Current Outpatient Medications   Medication Sig Dispense Refill    gabapentin (NEURONTIN) 100 mg capsule 1-2 tablets at bedtime. 60 Capsule 0    alendronate (FOSAMAX) 70 mg tablet Take 1 Tablet by mouth every seven (7) days. 12 Tablet 3    clonazePAM (KlonoPIN) 0.5 mg tablet TAKE 1 AND 1/2 TABLETS BY MOUTH EVERY NIGHT AT BEDTIME AS NEEDED. Need office visit for further refills 45 Tab 0    pramipexole (Mirapex) 0.75 mg tablet Take 1 Tab by mouth daily. 90 Tab 1    estradiol (VAGIFEM) 10 mcg tab vaginal tablet Insert 10 mcg into vagina every Tuesday and Friday.  estradiol 0.025 mg/24 hr ptsw Apply 1 Patch to affected area two (2) days a week.  progesterone (PROMETRIUM) 100 mg capsule Take 100 mg by mouth daily.  valACYclovir (VALTREX) 500 mg tablet Take 1 Tab by mouth two (2) times a day. For 1-3 days as needed. 30 Tab 3    citalopram (CELEXA) 10 mg tablet Take 1 Tab by mouth two (2) times a day. (Patient not taking: Reported on 6/1/2021) 60 Tab 5    triamcinolone acetonide (KENALOG) 0.1 % topical cream Apply  to affected area two (2) times a day. use thin layer (Patient not taking: Reported on 6/1/2021) 30 g 0    predniSONE (DELTASONE) 10 mg tablet 4 pills daily in AM w/food for 3 days, then 3 pills daily for 3 days, then 2 pills daily for 3 days, then 1 daily (Patient not taking: Reported on 6/1/2021) 30 Tab 0        Review of Systems    Pertinent items are noted in HPI. Objective: Wt Readings from Last 3 Encounters:   10/26/20 110 lb 6.4 oz (50.1 kg)   07/08/20 108 lb (49 kg)   04/29/20 108 lb (49 kg)     BP Readings from Last 3 Encounters:   10/26/20 107/72   08/20/20 116/62   07/08/20 101/65     Visit Vitals  /66   Pulse 82   Temp 97.5 °F (36.4 °C) (Temporal)   Resp 16   Ht 5' 3.75\" (1.619 m)   Wt 109 lb 9.6 oz (49.7 kg)   SpO2 100%   BMI 18.96 kg/m²     General appearance: alert, cooperative, no distress, appears stated age  Head: Normocephalic, without obvious abnormality, atraumatic  Neck: supple, symmetrical, trachea midline, no adenopathy, no carotid bruit and no JVD  Lungs: clear to auscultation bilaterally  Heart: regular rate and rhythm, S1, S2 normal, no murmur, click, rub or gallop  Abdomen: soft, non-tender.  Bowel sounds normal. No masses,  no organomegaly  Extremities: extremities normal, atraumatic, no cyanosis or edema

## 2021-06-29 DIAGNOSIS — G47.9 SLEEP DISORDER: ICD-10-CM

## 2021-06-29 DIAGNOSIS — F41.9 ANXIETY: ICD-10-CM

## 2021-06-29 RX ORDER — CLONAZEPAM 0.5 MG/1
TABLET ORAL
Qty: 45 TABLET | Refills: 0 | Status: SHIPPED | OUTPATIENT
Start: 2021-06-29 | End: 2021-08-10

## 2021-06-30 NOTE — TELEPHONE ENCOUNTER
Orders Placed This Encounter    clonazePAM (KlonoPIN) 0.5 mg tablet     Sig: TAKE 1 AND 1/2 TABLETS BY MOUTH EVERY NIGHT AT BEDTIME AS NEEDED.  Need office visit for further refills     Dispense:  45 Tablet     Refill:  0        reviewed 6/29/2021

## 2021-08-10 DIAGNOSIS — F41.9 ANXIETY: ICD-10-CM

## 2021-08-10 DIAGNOSIS — G47.9 SLEEP DISORDER: ICD-10-CM

## 2021-08-10 RX ORDER — CLONAZEPAM 0.5 MG/1
TABLET ORAL
Qty: 45 TABLET | Refills: 0 | Status: SHIPPED | OUTPATIENT
Start: 2021-08-10 | End: 2021-09-08 | Stop reason: SDUPTHER

## 2021-08-10 NOTE — TELEPHONE ENCOUNTER
Orders Placed This Encounter    clonazePAM (KlonoPIN) 0.5 mg tablet     Sig: TAKE 1 AND 1/2 TABLETS BY MOUTH EVERY NIGHT AT BEDTIME AS NEEDED     Dispense:  45 Tablet     Refill:  0        reviewed 8/10/2021

## 2021-09-02 ENCOUNTER — TRANSCRIBE ORDER (OUTPATIENT)
Dept: SCHEDULING | Age: 66
End: 2021-09-02

## 2021-09-02 DIAGNOSIS — M25.511 ACUTE PAIN OF RIGHT SHOULDER: Primary | ICD-10-CM

## 2021-09-03 NOTE — TELEPHONE ENCOUNTER
Pt will be out of town and wants to make sure she has enough prescription while she out      Requested Prescriptions     Pending Prescriptions Disp Refills    alendronate (FOSAMAX) 70 mg tablet 12 Tablet 3     Sig: Take 1 Tablet by mouth every seven (7) days.          Neo Zaldivar

## 2021-09-07 NOTE — TELEPHONE ENCOUNTER
Last OV 6/1/2021  Future Appointments   Date Time Provider Shuan Yao   9/8/2021  3:45 PM [facility] JESSE ZAMBRANO TOM   12/1/2021  9:00 AM Sandee Bernheim, MD [facility]

## 2021-09-08 ENCOUNTER — TELEPHONE (OUTPATIENT)
Dept: INTERNAL MEDICINE CLINIC | Age: 66
End: 2021-09-08

## 2021-09-08 DIAGNOSIS — G25.81 RESTLESS LEGS SYNDROME: ICD-10-CM

## 2021-09-08 DIAGNOSIS — F41.9 ANXIETY: ICD-10-CM

## 2021-09-08 DIAGNOSIS — G47.9 SLEEP DISORDER: ICD-10-CM

## 2021-09-08 RX ORDER — CLONAZEPAM 0.5 MG/1
TABLET ORAL
Qty: 45 TABLET | Refills: 0 | Status: SHIPPED | OUTPATIENT
Start: 2021-09-08 | End: 2021-11-08 | Stop reason: SDUPTHER

## 2021-09-08 RX ORDER — ALENDRONATE SODIUM 70 MG/1
70 TABLET ORAL
Qty: 12 TABLET | Refills: 1 | Status: SHIPPED | OUTPATIENT
Start: 2021-09-08

## 2021-09-08 RX ORDER — PRAMIPEXOLE DIHYDROCHLORIDE 0.75 MG/1
0.75 TABLET ORAL DAILY
Qty: 90 TABLET | Refills: 1 | Status: SHIPPED
Start: 2021-09-08 | End: 2021-12-16 | Stop reason: DRUGHIGH

## 2021-09-08 NOTE — TELEPHONE ENCOUNTER
Left voicemail message for patient advising that scripts requested have been filled.  She may reach out to the pharmacy for further detail

## 2021-09-08 NOTE — TELEPHONE ENCOUNTER
Orders Placed This Encounter    clonazePAM (KlonoPIN) 0.5 mg tablet     Sig: TAKE 1 AND 1/2 TABLETS BY MOUTH EVERY NIGHT AT BEDTIME AS NEEDED     Dispense:  45 Tablet     Refill:  0    pramipexole (MIRAPEX) 0.75 mg tablet     Sig: Take 1 Tablet by mouth daily.      Dispense:  90 Tablet     Refill:  1        reviewed 9/8/2021

## 2021-09-08 NOTE — TELEPHONE ENCOUNTER
Reason for call:  Patient is leaving 9/18 and returning 10/4 for vacation and will run out of 2 meds while gone. One is Pramipexole . 75mg and the other is Clonazepam .5mg. Please let patient know if these can be refilled early.     Is this a new problem: yes     Date of last appointment:  6/1/2021     Can we respond via [employer]: no    Best call back number:  [phone]

## 2021-09-10 ENCOUNTER — HOSPITAL ENCOUNTER (OUTPATIENT)
Dept: MRI IMAGING | Age: 66
Discharge: HOME | End: 2021-09-10
Attending: STUDENT IN AN ORGANIZED HEALTH CARE EDUCATION/TRAINING PROGRAM
Payer: MEDICARE

## 2021-09-10 DIAGNOSIS — M25.511 ACUTE PAIN OF RIGHT SHOULDER: ICD-10-CM

## 2021-09-10 PROCEDURE — 73221 MRI JOINT UPR EXTREM W/O DYE: CPT

## 2021-10-01 NOTE — TELEPHONE ENCOUNTER
Chief Complaint   Patient presents with   • Follow-up   • Office Visit       Beatrice Valle declines a chaperone.    SUBJECTIVE:    Patient ID: Beatrice Valle is a 68 year old female.    Pt here for follow up of asymptomatic fibroids..  Size was not noted so ultrasound done pending 14-week size uterus with multiple 2.5 to 4 cm fibroids.  She came back for follow-up ultrasound to make sure there was no short interval growth.  She has no bleeding or pain.  She has a history of an ablation.  She also has a history of a vulvar rash which was improved with triamcinolone nystatin in the past.    Patient's medications, allergies, past medical, surgical, social and family histories were reviewed and updated as appropriate.\"  Patient Active Problem List   Diagnosis   • Allergic rhinitis   • Asthma   • Benign essential hypertension   • Type 2 diabetes mellitus with hyperglycemia, with long-term current use of insulin (CMS/[facility])   • Hyperlipidemia   • Osteoporosis   • Vitamin D deficiency   • Keratoconus, bilateral   • Long term (current) use of insulin (CMS/[facility])   • Refractive error   • Acute bilateral low back pain without sciatica   • Diabetes mellitus due to underlying condition with microalbuminuria, with long-term current use of insulin (CMS/[facility])   • Morbid obesity with BMI of 50.0-59.9, adult (CMS/[facility])   • Tear of medial meniscus of knee, current   • Fibroid uterus      OB History    Para Term  AB Living   1 0 0 0 1 0   SAB TAB Ectopic Molar Multiple Live Births   0 0 0 0 0 0       Review of Systems   Constitutional: Negative for appetite change, chills, fatigue and fever.   Gastrointestinal: Negative for abdominal distention, abdominal pain, anal bleeding, blood in stool, constipation, diarrhea, nausea and vomiting.   Genitourinary: Negative for dysuria, flank pain, frequency, genital sores, hematuria and urgency.   Musculoskeletal: Negative for arthralgias, back pain, joint swelling and myalgias.  Was seen by Radha Rodriguez NP for contact dermatitis on 8/20/2020. Patient given steroid taper and she does not want to take this medication due to side effect of sleep disturbance. She would like to try steroid cream.     Orders Placed This Encounter    triamcinolone acetonide (KENALOG) 0.1 % topical cream     Sig: Apply  to affected area two (2) times a day.  use thin layer     Dispense:  30 g     Refill:  0   Skin: Negative for color change, rash and wound.   Neurological: Negative for dizziness, tremors, seizures, light-headedness, numbness and headaches.   Psychiatric/Behavioral: Negative for agitation, confusion, dysphoric mood, hallucinations, sleep disturbance and suicidal ideas.       OBJECTIVE:    Visit Vitals  BP (!) 148/94   Temp 98 °F (36.7 °C)   Ht 5' 1\" (1.549 m)   Wt 121.6 kg (268 lb)   BMI 50.64 kg/m²       Physical Exam   Constitutional: She is oriented to person, place, and time. She appears well-developed and well-nourished.   External genitalia normal, erythema around introitus  Vagina normal   Uterus bulky, difficult exam because of body habitus  Adnexa without masses  Pulmonary/Chest: Effort normal.   Card- normal distal pulses  Abdominal: Soft. She exhibits no distension, masses, tenderness.  Neurological: She is alert and oriented to person, place, and time.   Skin: Skin is warm and dry. No rash noted.   Psychiatric: She has a normal mood and affect.       Ultrasound today again with 14-week size uterus, questionable thickened endometrium      ASSESSMENT AND PLAN:  1. Uterine leiomyoma, unspecified location  Ultrasound reviewed with the patient.  Reviewed that she is multiple fibroids that appear unchanged but it is a difficult exam because of her body habitus.  There is also some question of whether one of the fibroids was infected endometrium but on further review comparing all studies, this seems unlikely.  I reviewed with the patient that the only way to know would be to do a hysteroscopy D&C, since EMB would be difficult after an ablation.  She understands and said she would like to just monitor and will call if there is any pain or bleeding.  I agree.    2. Vulvar rash  Mycolog cream to pharmacy.  She will call if it does not improve.      Madeline Villalta MD

## 2021-11-06 ENCOUNTER — PATIENT MESSAGE (OUTPATIENT)
Dept: INTERNAL MEDICINE CLINIC | Age: 66
End: 2021-11-06

## 2021-11-06 DIAGNOSIS — F41.9 ANXIETY: ICD-10-CM

## 2021-11-06 DIAGNOSIS — G47.9 SLEEP DISORDER: ICD-10-CM

## 2021-11-08 RX ORDER — CLONAZEPAM 0.5 MG/1
TABLET ORAL
Qty: 45 TABLET | Refills: 0 | Status: SHIPPED | OUTPATIENT
Start: 2021-11-08 | End: 2021-12-05

## 2021-11-08 NOTE — TELEPHONE ENCOUNTER
From: Luis Rodríguez  To: Margarita Campos MD  Sent: 11/6/2021 1:17 PM EDT  Subject: Prescription Question    Dr. Kaya Phan. I have an appointment with you scheduled for December 1st at 9 AM. It looks like your prescription for my Clonazepam was submitted to [facility] in September (8th, I believe), but maybe it was only for one month, which would have just lasted until October 8th? I requested a refill from [facility] this morning, because I knew I was running low, then saw that I only had 1/2 of a .5 mg. pill left! Called [facility] to see if an interim supply for 3 or 4 days was possible (until you could receive their request and approve it), but I should have known that wouldn't be possible, since it's a controlled medication. The pharmacist I spoke with suggested that I send you a message, just in case you happen to be on call this weekend.

## 2021-11-08 NOTE — TELEPHONE ENCOUNTER
Orders Placed This Encounter    clonazePAM (KlonoPIN) 0.5 mg tablet     Sig: TAKE 1 AND 1/2 TABLETS BY MOUTH EVERY NIGHT AT BEDTIME AS NEEDED     Dispense:  45 Tablet     Refill:  0        reviewed 11/8/2021

## 2021-12-01 ENCOUNTER — OFFICE VISIT (OUTPATIENT)
Dept: INTERNAL MEDICINE CLINIC | Age: 66
End: 2021-12-01
Payer: MEDICARE

## 2021-12-01 VITALS
SYSTOLIC BLOOD PRESSURE: 103 MMHG | OXYGEN SATURATION: 98 % | HEART RATE: 78 BPM | BODY MASS INDEX: 18.13 KG/M2 | HEIGHT: 64 IN | DIASTOLIC BLOOD PRESSURE: 64 MMHG | RESPIRATION RATE: 15 BRPM | TEMPERATURE: 97.1 F | WEIGHT: 106.2 LBS

## 2021-12-01 DIAGNOSIS — G47.9 SLEEP DISTURBANCE: ICD-10-CM

## 2021-12-01 DIAGNOSIS — G25.81 RESTLESS LEGS SYNDROME: ICD-10-CM

## 2021-12-01 DIAGNOSIS — Z28.21 REFUSED PNEUMOCOCCAL VACCINE: ICD-10-CM

## 2021-12-01 DIAGNOSIS — Z79.899 ENCOUNTER FOR LONG-TERM (CURRENT) USE OF MEDICATIONS: ICD-10-CM

## 2021-12-01 DIAGNOSIS — E78.2 MIXED HYPERLIPIDEMIA: ICD-10-CM

## 2021-12-01 DIAGNOSIS — Z13.31 SCREENING FOR DEPRESSION: ICD-10-CM

## 2021-12-01 DIAGNOSIS — M81.0 AGE-RELATED OSTEOPOROSIS WITHOUT CURRENT PATHOLOGICAL FRACTURE: ICD-10-CM

## 2021-12-01 DIAGNOSIS — Z28.21 REFUSED INFLUENZA VACCINE: ICD-10-CM

## 2021-12-01 DIAGNOSIS — Z00.00 MEDICARE ANNUAL WELLNESS VISIT, SUBSEQUENT: Primary | ICD-10-CM

## 2021-12-01 DIAGNOSIS — F33.8 SEASONAL AFFECTIVE DISORDER (HCC): ICD-10-CM

## 2021-12-01 LAB
ALBUMIN SERPL-MCNC: 3.8 G/DL (ref 3.5–5)
ALBUMIN/GLOB SERPL: 1.3 {RATIO} (ref 1.1–2.2)
ALP SERPL-CCNC: 76 U/L (ref 45–117)
ALT SERPL-CCNC: 24 U/L (ref 12–78)
ANION GAP SERPL CALC-SCNC: 5 MMOL/L (ref 5–15)
APPEARANCE UR: CLEAR
AST SERPL-CCNC: 24 U/L (ref 15–37)
BACTERIA URNS QL MICRO: NEGATIVE /HPF
BASOPHILS # BLD: 0 K/UL (ref 0–0.1)
BASOPHILS NFR BLD: 1 % (ref 0–1)
BILIRUB SERPL-MCNC: 0.3 MG/DL (ref 0.2–1)
BILIRUB UR QL: NEGATIVE
BUN SERPL-MCNC: 26 MG/DL (ref 6–20)
BUN/CREAT SERPL: 41 (ref 12–20)
CALCIUM SERPL-MCNC: 9.3 MG/DL (ref 8.5–10.1)
CHLORIDE SERPL-SCNC: 105 MMOL/L (ref 97–108)
CHOLEST SERPL-MCNC: 196 MG/DL
CO2 SERPL-SCNC: 27 MMOL/L (ref 21–32)
COLOR UR: NORMAL
CREAT SERPL-MCNC: 0.63 MG/DL (ref 0.55–1.02)
DIFFERENTIAL METHOD BLD: ABNORMAL
EOSINOPHIL # BLD: 0.4 K/UL (ref 0–0.4)
EOSINOPHIL NFR BLD: 7 % (ref 0–7)
EPITH CASTS URNS QL MICRO: NORMAL /LPF
ERYTHROCYTE [DISTWIDTH] IN BLOOD BY AUTOMATED COUNT: 12.7 % (ref 11.5–14.5)
GLOBULIN SER CALC-MCNC: 2.9 G/DL (ref 2–4)
GLUCOSE SERPL-MCNC: 81 MG/DL (ref 65–100)
GLUCOSE UR STRIP.AUTO-MCNC: NEGATIVE MG/DL
HCT VFR BLD AUTO: 34.1 % (ref 35–47)
HDLC SERPL-MCNC: 91 MG/DL
HDLC SERPL: 2.2 {RATIO} (ref 0–5)
HGB BLD-MCNC: 11.1 G/DL (ref 11.5–16)
HGB UR QL STRIP: NEGATIVE
IMM GRANULOCYTES # BLD AUTO: 0 K/UL (ref 0–0.04)
IMM GRANULOCYTES NFR BLD AUTO: 0 % (ref 0–0.5)
KETONES UR QL STRIP.AUTO: NEGATIVE MG/DL
LDLC SERPL CALC-MCNC: 98 MG/DL (ref 0–100)
LEUKOCYTE ESTERASE UR QL STRIP.AUTO: NEGATIVE
LYMPHOCYTES # BLD: 1.8 K/UL (ref 0.8–3.5)
LYMPHOCYTES NFR BLD: 31 % (ref 12–49)
MCH RBC QN AUTO: 31.8 PG (ref 26–34)
MCHC RBC AUTO-ENTMCNC: 32.6 G/DL (ref 30–36.5)
MCV RBC AUTO: 97.7 FL (ref 80–99)
MONOCYTES # BLD: 0.5 K/UL (ref 0–1)
MONOCYTES NFR BLD: 9 % (ref 5–13)
NEUTS SEG # BLD: 3 K/UL (ref 1.8–8)
NEUTS SEG NFR BLD: 52 % (ref 32–75)
NITRITE UR QL STRIP.AUTO: NEGATIVE
NRBC # BLD: 0 K/UL (ref 0–0.01)
NRBC BLD-RTO: 0 PER 100 WBC
PH UR STRIP: 7.5 [PH] (ref 5–8)
PLATELET # BLD AUTO: 245 K/UL (ref 150–400)
PMV BLD AUTO: 11.6 FL (ref 8.9–12.9)
POTASSIUM SERPL-SCNC: 4.7 MMOL/L (ref 3.5–5.1)
PROT SERPL-MCNC: 6.7 G/DL (ref 6.4–8.2)
PROT UR STRIP-MCNC: NEGATIVE MG/DL
RBC # BLD AUTO: 3.49 M/UL (ref 3.8–5.2)
RBC #/AREA URNS HPF: NORMAL /HPF (ref 0–5)
SODIUM SERPL-SCNC: 137 MMOL/L (ref 136–145)
SP GR UR REFRACTOMETRY: 1.02 (ref 1–1.03)
TRIGL SERPL-MCNC: 35 MG/DL (ref ?–150)
UA: UC IF INDICATED,UAUC: NORMAL
UROBILINOGEN UR QL STRIP.AUTO: 0.2 EU/DL (ref 0.2–1)
VLDLC SERPL CALC-MCNC: 7 MG/DL
WBC # BLD AUTO: 5.7 K/UL (ref 3.6–11)
WBC URNS QL MICRO: NORMAL /HPF (ref 0–4)

## 2021-12-01 PROCEDURE — G8536 NO DOC ELDER MAL SCRN: HCPCS | Performed by: INTERNAL MEDICINE

## 2021-12-01 PROCEDURE — G0463 HOSPITAL OUTPT CLINIC VISIT: HCPCS | Performed by: INTERNAL MEDICINE

## 2021-12-01 PROCEDURE — G9717 DOC PT DX DEP/BP F/U NT REQ: HCPCS | Performed by: INTERNAL MEDICINE

## 2021-12-01 PROCEDURE — G8427 DOCREV CUR MEDS BY ELIG CLIN: HCPCS | Performed by: INTERNAL MEDICINE

## 2021-12-01 PROCEDURE — G0439 PPPS, SUBSEQ VISIT: HCPCS | Performed by: INTERNAL MEDICINE

## 2021-12-01 PROCEDURE — 1101F PT FALLS ASSESS-DOCD LE1/YR: CPT | Performed by: INTERNAL MEDICINE

## 2021-12-01 PROCEDURE — 1090F PRES/ABSN URINE INCON ASSESS: CPT | Performed by: INTERNAL MEDICINE

## 2021-12-01 PROCEDURE — G9899 SCRN MAM PERF RSLTS DOC: HCPCS | Performed by: INTERNAL MEDICINE

## 2021-12-01 PROCEDURE — G8419 CALC BMI OUT NRM PARAM NOF/U: HCPCS | Performed by: INTERNAL MEDICINE

## 2021-12-01 PROCEDURE — 3017F COLORECTAL CA SCREEN DOC REV: CPT | Performed by: INTERNAL MEDICINE

## 2021-12-01 PROCEDURE — 99213 OFFICE O/P EST LOW 20 MIN: CPT | Performed by: INTERNAL MEDICINE

## 2021-12-01 RX ORDER — CLOSTRIDIUM TETANI TOXOID ANTIGEN (FORMALDEHYDE INACTIVATED), CORYNEBACTERIUM DIPHTHERIAE TOXOID ANTIGEN (FORMALDEHYDE INACTIVATED), BORDETELLA PERTUSSIS TOXOID ANTIGEN (GLUTARALDEHYDE INACTIVATED), BORDETELLA PERTUSSIS FILAMENTOUS HEMAGGLUTININ ANTIGEN (FORMALDEHYDE INACTIVATED), BORDETELLA PERTUSSIS PERTACTIN ANTIGEN, AND BORDETELLA PERTUSSIS FIMBRIAE 2/3 ANTIGEN 5; 2; 2.5; 5; 3; 5 [LF]/.5ML; [LF]/.5ML; UG/.5ML; UG/.5ML; UG/.5ML; UG/.5ML
0.5 INJECTION, SUSPENSION INTRAMUSCULAR ONCE
Qty: 0.5 ML | Refills: 0 | Status: SHIPPED | OUTPATIENT
Start: 2021-12-01 | End: 2021-12-01

## 2021-12-01 RX ORDER — CITALOPRAM 10 MG/1
10 TABLET ORAL 2 TIMES DAILY
Qty: 60 TABLET | Refills: 5 | Status: SHIPPED | OUTPATIENT
Start: 2021-12-01

## 2021-12-01 NOTE — PATIENT INSTRUCTIONS
Medicare Wellness Visit, Female     The best way to live healthy is to have a lifestyle where you eat a well-balanced diet, exercise regularly, limit alcohol use, and quit all forms of tobacco/nicotine, if applicable. Regular preventive services are another way to keep healthy. Preventive services (vaccines, screening tests, monitoring & exams) can help personalize your care plan, which helps you manage your own care. Screening tests can find health problems at the earliest stages, when they are easiest to treat. [facility] follows the current, evidence-based guidelines published by the [facility]sey ([facility] [[facility]]STF) when recommending preventive services for our patients. Because we follow these guidelines, sometimes recommendations change over time as research supports it. (For example, mammograms used to be recommended annually. Even though Medicare will still pay for an annual mammogram, the newer guidelines recommend a mammogram every two years for women of average risk). Of course, you and your doctor may decide to screen more often for some diseases, based on your risk and your co-morbidities (chronic disease you are already diagnosed with). Preventive services for you include:  - Medicare offers their members a free annual wellness visit, which is time for you and your primary care provider to discuss and plan for your preventive service needs. Take advantage of this benefit every year!  -All adults over the age of 72 should receive the recommended pneumonia vaccines. Current USPSTF guidelines recommend a series of two vaccines for the best pneumonia protection.   -All adults should have a flu vaccine yearly and a tetanus vaccine every 10 years.   -All adults age 48 and older should receive the shingles vaccines (series of two vaccines).       -All adults age 38-68 who are overweight should have a diabetes screening test once every three years.   -All adults born between [birth year] and 1965 should be screened once for Hepatitis C.  -Other screening tests and preventive services for persons with diabetes include: an eye exam to screen for diabetic retinopathy, a kidney function test, a foot exam, and stricter control over your cholesterol.   -Cardiovascular screening for adults with routine risk involves an electrocardiogram (ECG) at intervals determined by your doctor.   -Colorectal cancer screenings should be done for adults age 54-65 with no increased risk factors for colorectal cancer. There are a number of acceptable methods of screening for this type of cancer. Each test has its own benefits and drawbacks. Discuss with your doctor what is most appropriate for you during your annual wellness visit. The different tests include: colonoscopy (considered the best screening method), a fecal occult blood test, a fecal DNA test, and sigmoidoscopy.    -A bone mass density test is recommended when a woman turns 65 to screen for osteoporosis. This test is only recommended one time, as a screening. Some providers will use this same test as a disease monitoring tool if you already have osteoporosis. -Breast cancer screenings are recommended every other year for women of normal risk, age 54-69.  -Cervical cancer screenings for women over age 72 are only recommended with certain risk factors.      Here is a list of your current Health Maintenance items (your personalized list of preventive services) with a due date:  Health Maintenance Due   Topic Date Due    Shingles Vaccine (1 of 2) Never done    DTaP/Tdap/Td  (2 - Td or Tdap) 12/17/2018    Pneumococcal Vaccine (1 of 1 - PPSV23) Never done    Yearly Flu Vaccine (1) Never done    COVID-19 Vaccine (3 - Booster for Moderna series) 10/16/2021

## 2021-12-01 NOTE — PROGRESS NOTES
Assessment/Plan:     1. Age-related osteoporosis without current pathological fracture  -has only taken 2 tablets of her fosamax since her last visit in June, 2021 when she decided to take treatment for her osteoporosis.    -last DXA done 1/2020  -encouraged for her to take her fosamax weekly for it to be effective    2. Restless legs syndrome  -using mirapex daily and clonazepam at night . 3. Sleep disturbance  -using clonazepam nightly. 4. Seasonal affective disorder ([facility] [address]  -she stops use of her celexa during the summer and would like to resume use at this time.   -she takes celexa 10mg bid. Script filled today. 5. Encounter for long-term (current) use of medications      6. Medicare annual wellness visit, subsequent  -completed today.  -[facility] decision maker reviewed with patient and updated.    -has established ACP, but needs update. Patient given copy of the [place] ACP template.    -she declined covid booster, shingrix, pneumonia vaccine and flu vaccine. She may get tdap. Script given. - DEPRESSION SCREEN ANNUAL    7. Screening for depression    - DEPRESSION SCREEN ANNUAL    8. Mixed hyperlipidemia  -diet controlled. Check labs today. - CBC WITH AUTOMATED DIFF; Future  - METABOLIC PANEL, COMPREHENSIVE; Future  - LIPID PANEL;  Future  - URINALYSIS W/ REFLEX CULTURE; Future  - URINALYSIS W/ REFLEX CULTURE  - LIPID PANEL  - METABOLIC PANEL, COMPREHENSIVE  - CBC WITH AUTOMATED DIFF     Orders Placed This Encounter    ANNUAL DEPRESSION SCREEN 8-15 MIN    CBC WITH AUTOMATED DIFF     Standing Status:   Future     Number of Occurrences:   1     Standing Expiration Date:   15/7/2253    METABOLIC PANEL, COMPREHENSIVE     Standing Status:   Future     Number of Occurrences:   1     Standing Expiration Date:   12/1/2022    LIPID PANEL     Standing Status:   Future     Number of Occurrences:   1     Standing Expiration Date:   12/1/2022    URINALYSIS W/ REFLEX CULTURE     Standing Status: Future     Number of Occurrences:   1     Standing Expiration Date:   2022    diph,pertuss,acel,,tetanus vac,PF, (Adacel,Tdap Adolesn/Adult,,PF,) 2 Lf-(2.5-5-3-5 mcg)-5Lf/0.5 mL syrg vaccine     Si.5 mL by IntraMUSCular route once for 1 dose. Dispense:  0.5 mL     Refill:  0    citalopram (CELEXA) 10 mg tablet     Sig: Take 1 Tablet by mouth two (2) times a day. Dispense:  60 Tablet     Refill:  5        Follow-up Disposition:     Follow up in 6 months              Subjective:      Nik Barlow is a 78 y.o. female who presents today for her Medicare Wellness Exam and follow up of her restless legs, sleep disturbance, osteoporosis and seasonal affective disorder. Since last visit 2021:  -started on fosamax in . Last DXA done 2020. Has only taken it twice since .   -bone on bone right knee. Has seen Dr. Feliz Purdy in the past. Pain mostly when she goes hiking.   -left big toe pain, at base. Worse with hiking.   -had PT for right shoulder, had cortisone shot. Now better. Patient Care Team:  -Dr. Janel Christine - GI    Due for:  -immunizations -  shingrix, pneumovax, Covid booster. - declines all of these immunizations. May consider tdap booster. Objective:      Wt Readings from Last 3 Encounters:   21 106 lb 3.2 oz (48.2 kg)   21 109 lb 9.6 oz (49.7 kg)   10/26/20 110 lb 6.4 oz (50.1 kg)     BP Readings from Last 3 Encounters:   21 101/66   10/26/20 107/72   20 116/62     Visit Vitals  /64   Pulse 78   Temp 97.1 °F (36.2 °C) (Temporal)   Resp 15   Ht 5' 3.75\" (1.619 m)   Wt 106 lb 3.2 oz (48.2 kg)   SpO2 98%   BMI 18.37 kg/m²     General appearance: alert, cooperative, no distress, appears stated age  Head: Normocephalic, without obvious abnormality, atraumatic  Neck: supple, symmetrical, trachea midline, no adenopathy, no carotid bruit and no JVD  Lungs: clear to auscultation bilaterally  Heart: regular rate and rhythm, S1, S2 normal, no murmur, click, rub or gallop  Abdomen: soft, non-tender. Bowel sounds normal. No masses,  no organomegaly  Extremities: extremities normal, atraumatic, no cyanosis or edema  Pulses: 2+ and symmetric              Disclaimer:  Return if symptoms worsen or fail to improve. Advised patient to call back or return to office if symptoms worsen/change/persist.     Discussed expected course/resolution/complications of diagnosis in detail with patient. Medication risks/benefits/costs/interactions/alternatives discussed with patient. Patient was given an after visit summary which includes diagnoses, current medications, & vitals. Patient expressed understanding with the diagnosis and plan. This is the Subsequent Medicare Annual Wellness Exam, performed 12 months or more after the Initial AWV or the last Subsequent AWV    I have reviewed the patient's medical history in detail and updated the computerized patient record. Assessment/Plan   Education and counseling provided:  Are appropriate based on today's review and evaluation    1. Medicare annual wellness visit, subsequent  -     [address] 67  2. Age-related osteoporosis without current pathological fracture  3. Restless legs syndrome  4. Sleep disturbance  5. Seasonal affective disorder ([facility] [address]  6. Encounter for long-term (current) use of medications  7. Screening for depression  -     DEPRESSION SCREEN ANNUAL       Depression Risk Factor Screening     3 most recent PHQ Screens 6/1/2021   PHQ Not Done -   Little interest or pleasure in doing things Not at all   Feeling down, depressed, irritable, or hopeless Not at all   Total Score PHQ 2 0       Alcohol Risk Screen    Do you average more than 1 drink per night or more than 7 drinks a week:  No    On any one occasion in the past three months have you have had more than 3 drinks containing alcohol:  No        Functional Ability and Level of Safety    Hearing: Hearing is good. Activities of Daily Living: The home contains: no safety equipment. Patient does total self care      Ambulation: with no difficulty     Fall Risk:  Fall Risk Assessment, last 12 mths 12/1/2021   Able to walk? Yes   Fall in past 12 months? 1   Do you feel unsteady? 0   Are you worried about falling 0   Is TUG test greater than 12 seconds? 0   Is the gait abnormal? 0   Number of falls in past 12 months 1   Fall with injury?  0      Abuse Screen:  Patient is not abused       Cognitive Screening    Has your family/caregiver stated any concerns about your memory: no         Health Maintenance Due     Health Maintenance Due   Topic Date Due    Shingrix Vaccine Age 49> (1 of 2) Never done    DTaP/Tdap/Td series (2 - Td or Tdap) 12/17/2018    Pneumococcal 65+ years (1 of 1 - PPSV23) Never done    Flu Vaccine (1) Never done    COVID-19 Vaccine (3 - Booster for [employer] series) 10/16/2021       Patient Care Team   Patient Care Team:  Raquel Garcia MD as PCP - General (Internal Medicine)  Raquel Garcia MD as PCP - [address] [place] [address] Provider  Eleonora Issa MD as Physician (Gastroenterology)    History     Patient Active Problem List   Diagnosis Code    Restless legs syndrome G25.81    Sleep disorder G47.9    Age-related osteoporosis without current pathological fracture M81.0    Celiac disease K90.0    Iron deficiency E61.1    Pap smear for cervical cancer screening Z12.4    History of screening mammography Z92.89    History of bone density study Z92.89    Colon cancer screening Z12.11    Herpetic lesion B00.9    Lumbar disc disease with radiculopathy M51.16    Depression with anxiety F41.8    Advance directive discussed with patient Z71.89    Mild depression ([facility] [address] F32.0     Past Medical History:   Diagnosis Date    Arthritis     Left great toe, R knee, L thumb    Celiac syndrome     by blood testing    Iron deficiency     Restless legs       Past Surgical History:   Procedure Laterality Date    ENDOSCOPY, COLON, DIAGNOSTIC   &     benign sessile polyp in 2013 - repeat in 5 years    HX GYN      lap for blocked fallopian tube    HX GYN           HX ORTHOPAEDIC  1975    R knee menisectomy     Current Outpatient Medications   Medication Sig Dispense Refill    clonazePAM (KlonoPIN) 0.5 mg tablet TAKE 1 AND 1/2 TABLETS BY MOUTH EVERY NIGHT AT BEDTIME AS NEEDED 45 Tablet 0    alendronate (FOSAMAX) 70 mg tablet Take 1 Tablet by mouth every seven (7) days. 12 Tablet 1    pramipexole (MIRAPEX) 0.75 mg tablet Take 1 Tablet by mouth daily. 90 Tablet 1    triamcinolone acetonide (KENALOG) 0.1 % topical cream Apply  to affected area two (2) times a day. use thin layer 30 g 0    estradiol (VAGIFEM) 10 mcg tab vaginal tablet Insert 10 mcg into vagina every Tuesday and Friday.  estradiol 0.025 mg/24 hr ptsw Apply  to affected area. Every 5 days      progesterone (PROMETRIUM) 100 mg capsule Take 100 mg by mouth daily.  valACYclovir (VALTREX) 500 mg tablet Take 1 Tab by mouth two (2) times a day. For 1-3 days as needed. 30 Tab 3    gabapentin (NEURONTIN) 100 mg capsule 1-2 tablets at bedtime. (Patient not taking: Reported on 2021) 60 Capsule 0    citalopram (CELEXA) 10 mg tablet Take 1 Tab by mouth two (2) times a day.  (Patient not taking: Reported on 2021) 60 Tab 5    predniSONE (DELTASONE) 10 mg tablet 4 pills daily in AM w/food for 3 days, then 3 pills daily for 3 days, then 2 pills daily for 3 days, then 1 daily (Patient not taking: Reported on 2021) 30 Tab 0     Allergies   Allergen Reactions    Ambien Cr [Zolpidem] Other (comments)     Agitation & mood lability; higher dosages only    Gluten Other (comments)    Requip [Ropinirole] Nausea Only       Family History   Problem Relation Age of Onset    Kidney Disease Mother     Hypertension Mother     Heart Failure Mother     Stroke Father     Arthritis-osteo Father     Heart Failure Father     Thyroid Disease Sister     Arthritis-osteo Paternal Grandmother      Social History     Tobacco Use    Smoking status: Never Smoker    Smokeless tobacco: Never Used   Substance Use Topics    Alcohol use:  Yes     Alcohol/week: 2.5 - 3.3 standard drinks     Types: 3 - 4 Glasses of wine per week     Comment: Rarely         Darwin Jimenez MD

## 2021-12-05 DIAGNOSIS — G47.9 SLEEP DISORDER: ICD-10-CM

## 2021-12-05 DIAGNOSIS — F41.9 ANXIETY: ICD-10-CM

## 2021-12-05 RX ORDER — PRAMIPEXOLE DIHYDROCHLORIDE 0.5 MG/1
TABLET ORAL
Qty: 180 TABLET | Refills: 1 | Status: SHIPPED
Start: 2021-12-05 | End: 2021-12-16 | Stop reason: DRUGHIGH

## 2021-12-05 RX ORDER — CLONAZEPAM 0.5 MG/1
TABLET ORAL
Qty: 45 TABLET | Refills: 0 | Status: SHIPPED | OUTPATIENT
Start: 2021-12-05 | End: 2022-01-27

## 2021-12-05 NOTE — TELEPHONE ENCOUNTER
Orders Placed This Encounter    clonazePAM (KlonoPIN) 0.5 mg tablet     Sig: TAKE 1 AND 1/2 TABLETS BY MOUTH EVERY NIGHT AT BEDTIME AS NEEDED     Dispense:  45 Tablet     Refill:  0    pramipexole (MIRAPEX) 0.5 mg tablet     Sig: TAKE ONE TABLET BY MOUTH 2 TIMES A DAY     Dispense:  180 Tablet     Refill:  1        reviewed 12/5/2021

## 2021-12-16 ENCOUNTER — PATIENT MESSAGE (OUTPATIENT)
Dept: INTERNAL MEDICINE CLINIC | Age: 66
End: 2021-12-16

## 2021-12-16 RX ORDER — PRAMIPEXOLE DIHYDROCHLORIDE 0.5 MG/1
TABLET ORAL
Qty: 45 TABLET | Refills: 5 | Status: SHIPPED | OUTPATIENT
Start: 2021-12-16

## 2021-12-16 NOTE — TELEPHONE ENCOUNTER
From: Royal Rodríguez  To: John Wagoner MD  Sent: 12/16/2021 2:48 PM EST  Subject: Pramipexole dosage    Dr. Matteo Phan had previously written my prescription for Pramipexole as \"take 1 1/2, as needed. \" I was not taking that whole dosage all at once. Would take . 5 mg at bedtime, and if I woke up early (2-3 AM) would take the extra half (.25). I have been taking the .75 mg dosage that you wrote for me when I saw you recently, and I think that taking it all at once has been problematic for me. Have felt \"fuzzy, light-headed, lethargic. \" Just not myself, and I don't think these are the same symptoms that necessitated the Celexa. Can you please write a new prescription for the Pramipexole for . 5 mg. to take 1 1/2 as needed, count 45? Thanks. I think I have 2 or 3 .5 mg. tabs that I can take for now.

## 2022-01-27 DIAGNOSIS — F41.9 ANXIETY: ICD-10-CM

## 2022-01-27 DIAGNOSIS — G47.9 SLEEP DISORDER: ICD-10-CM

## 2022-01-27 RX ORDER — CLONAZEPAM 0.5 MG/1
TABLET ORAL
Qty: 45 TABLET | Refills: 0 | Status: SHIPPED | OUTPATIENT
Start: 2022-01-27 | End: 2022-03-16

## 2022-01-27 NOTE — TELEPHONE ENCOUNTER
Orders Placed This Encounter    clonazePAM (KlonoPIN) 0.5 mg tablet     Sig: TAKE 1 AND 1/2 TABLET BY MOUTH EVERY NIGHT AT BEDTIME AS NEEDED     Dispense:  45 Tablet     Refill:  0        reviewed 1/27/2022

## 2022-03-03 ENCOUNTER — VIRTUAL VISIT (OUTPATIENT)
Dept: INTERNAL MEDICINE CLINIC | Age: 67
End: 2022-03-03
Payer: MEDICARE

## 2022-03-03 DIAGNOSIS — R23.4 FISSURE OF SKIN: Primary | ICD-10-CM

## 2022-03-03 PROCEDURE — G9717 DOC PT DX DEP/BP F/U NT REQ: HCPCS | Performed by: NURSE PRACTITIONER

## 2022-03-03 PROCEDURE — 99213 OFFICE O/P EST LOW 20 MIN: CPT | Performed by: NURSE PRACTITIONER

## 2022-03-03 PROCEDURE — G8427 DOCREV CUR MEDS BY ELIG CLIN: HCPCS | Performed by: NURSE PRACTITIONER

## 2022-03-03 PROCEDURE — 1101F PT FALLS ASSESS-DOCD LE1/YR: CPT | Performed by: NURSE PRACTITIONER

## 2022-03-03 PROCEDURE — G0463 HOSPITAL OUTPT CLINIC VISIT: HCPCS | Performed by: NURSE PRACTITIONER

## 2022-03-03 PROCEDURE — 1090F PRES/ABSN URINE INCON ASSESS: CPT | Performed by: NURSE PRACTITIONER

## 2022-03-03 PROCEDURE — G9899 SCRN MAM PERF RSLTS DOC: HCPCS | Performed by: NURSE PRACTITIONER

## 2022-03-03 PROCEDURE — 3017F COLORECTAL CA SCREEN DOC REV: CPT | Performed by: NURSE PRACTITIONER

## 2022-03-03 NOTE — PROGRESS NOTES
Jessica Ferguson is a 78 y.o. female who was seen by synchronous (real-time) audio-video technology on 3/3/2022. Assessment & Plan:   Below is the assessment and plan developed based on review of pertinent history, physical exam (if applicable), labs, studies, and medications. 1. Fissure of skin  aquaphor daily  Follow-up if any signs of infection develop, such as increased redness, swelling, drainage, or warmth  May leave super glue intact at this point  If it falls off then epsom salt soaks advised  Leave open to air when able  TDAP advised-patient already has order for it from Dr. Carmen Balderas spent at least 23 minutes on this visit with this established patient. ([id number])  Subjective:   Jessica Ferguson was seen for Finger Pain      Patient reports right thumb cut with mild swelling. She tends to get small fissures of the skin of her fingers during the winter. She had one on her right thumb for a few weeks. She then cut her thumb under the nail 3 weeks ago while cleaning a knife. 4 days ago she was brushing her dog and cut her thumb in the same area with the dog brush while trying to clean it. She cleaned site and applied super glue which is still intact. No redness or drainage noted, but thumb does appear slightly swollen to patient. Tetanus not up to date. Prior to Admission medications    Medication Sig Start Date End Date Taking? Authorizing Provider   clonazePAM (KlonoPIN) 0.5 mg tablet TAKE 1 AND 1/2 TABLET BY MOUTH EVERY NIGHT AT BEDTIME AS NEEDED 1/27/22   Doc Charlton MD   pramipexole (Mirapex) 0.5 mg tablet 1 to 1 1/2 tab nightly. 12/16/21   Doc Charlton MD   citalopram (CELEXA) 10 mg tablet Take 1 Tablet by mouth two (2) times a day. 12/1/21   Doc Charlton MD   alendronate (FOSAMAX) 70 mg tablet Take 1 Tablet by mouth every seven (7) days. 9/8/21   Doc Charlton MD   triamcinolone acetonide (KENALOG) 0.1 % topical cream Apply  to affected area two (2) times a day.  use thin layer 8/23/20   Doc Charlton MD estradiol (VAGIFEM) 10 mcg tab vaginal tablet Insert 10 mcg into vagina every Tuesday and Friday. 12/18/17   Provider, Historical   estradiol 0.025 mg/24 hr ptsw Apply  to affected area. Every 5 days 5/12/17   Provider, Historical   progesterone (PROMETRIUM) 100 mg capsule Take 100 mg by mouth daily. 5/11/17   Provider, Historical   valACYclovir (VALTREX) 500 mg tablet Take 1 Tab by mouth two (2) times a day. For 1-3 days as needed.  5/13/16   Judit Sánchez MD       Patient Active Problem List   Diagnosis Code    Restless legs syndrome G25.81    Sleep disorder G47.9    Age-related osteoporosis without current pathological fracture M81.0    Celiac disease K90.0    Iron deficiency E61.1    Pap smear for cervical cancer screening Z12.4    History of screening mammography Z92.89    History of bone density study Z92.89    Colon cancer screening Z12.11    Herpetic lesion B00.9    Lumbar disc disease with radiculopathy M51.16    Depression with anxiety F41.8    Advance directive discussed with patient Z71.89    Mild depression ([address] F32.0     Patient Active Problem List    Diagnosis Date Noted    Mild depression ([address] 12/17/2018    Advance directive discussed with patient 05/13/2016    Depression with anxiety 02/06/2015    Lumbar disc disease with radiculopathy 01/03/2014    Herpetic lesion 12/03/2013    Pap smear for cervical cancer screening 11/29/2012    History of screening mammography 11/29/2012    History of bone density study 11/29/2012    Colon cancer screening 11/29/2012    Restless legs syndrome 05/15/2012    Sleep disorder 05/15/2012    Age-related osteoporosis without current pathological fracture 05/15/2012    Celiac disease 05/15/2012    Iron deficiency 05/15/2012     Current Outpatient Medications   Medication Sig Dispense Refill    clonazePAM (KlonoPIN) 0.5 mg tablet TAKE 1 AND 1/2 TABLET BY MOUTH EVERY NIGHT AT BEDTIME AS NEEDED 45 Tablet 0    pramipexole (Mirapex) 0.5 mg tablet 1 to 1 1/2 tab nightly. 45 Tablet 5    citalopram (CELEXA) 10 mg tablet Take 1 Tablet by mouth two (2) times a day. 60 Tablet 5    alendronate (FOSAMAX) 70 mg tablet Take 1 Tablet by mouth every seven (7) days. 12 Tablet 1    triamcinolone acetonide (KENALOG) 0.1 % topical cream Apply  to affected area two (2) times a day. use thin layer 30 g 0    estradiol (VAGIFEM) 10 mcg tab vaginal tablet Insert 10 mcg into vagina every Tuesday and Friday.  estradiol 0.025 mg/24 hr ptsw Apply  to affected area. Every 5 days      progesterone (PROMETRIUM) 100 mg capsule Take 100 mg by mouth daily.  valACYclovir (VALTREX) 500 mg tablet Take 1 Tab by mouth two (2) times a day. For 1-3 days as needed. 30 Tab 3     Allergies   Allergen Reactions    Ambien Cr [Zolpidem] Other (comments)     Agitation & mood lability; higher dosages only    Gluten Other (comments)    Requip [Ropinirole] Nausea Only     Past Medical History:   Diagnosis Date    Arthritis     Left great toe, R knee, L thumb    Celiac syndrome     by blood testing    Iron deficiency     Restless legs      Past Surgical History:   Procedure Laterality Date    ENDOSCOPY, COLON, DIAGNOSTIC   &     benign sessile polyp in 2013 - repeat in 5 years    HX GYN      lap for blocked fallopian tube    HX GYN           HX ORTHOPAEDIC  1975    R knee menisectomy     Family History   Problem Relation Age of Onset    Kidney Disease Mother     Hypertension Mother     Heart Failure Mother     Stroke Father     OSTEOARTHRITIS Father     Heart Failure Father     Thyroid Disease Sister     OSTEOARTHRITIS Paternal Grandmother      Social History     Tobacco Use    Smoking status: Never Smoker    Smokeless tobacco: Never Used   Substance Use Topics    Alcohol use:  Yes     Alcohol/week: 2.5 - 3.3 standard drinks     Types: 3 - 4 Glasses of wine per week     Comment: Rarely       ROS - per HPI      Objective:   No flowsheet data found.  General: alert, cooperative, no distress   Mental  status: normal mood, behavior, speech, dress, motor activity, and thought processes, able to follow commands   Eyes: EOM intact, normal sclera   Mouth: mucous membranes moist   Neck: no visualized mass   Resp: normal effort and no respiratory distress   Neuro: no gross deficits   Musculoskeletal: normal ROM of neck   Skin: Fissure of lateral right thumb, super glue covering, unable to visualize swelling via virtual visit, but patient notes it to have mild swelling; no warmth, erythema, or drainage     Psychiatric: normal affect, no hallucinations     Moralez Grooms Stolcis, was evaluated through a synchronous (real-time) audio-video encounter. The patient (or guardian if applicable) is aware that this is a billable service. Verbal consent to proceed has been obtained within the past 12 months. The visit was conducted pursuant to the emergency declaration under the [address] [place] [address] authority and the [employer] General Act. Patient identification was verified, and a caregiver was present when appropriate. The patient was located in a state where the provider was credentialed to provide care.      Kayleen Arroyo NP

## 2022-03-15 DIAGNOSIS — G25.81 RESTLESS LEGS SYNDROME: ICD-10-CM

## 2022-03-15 DIAGNOSIS — G47.9 SLEEP DISORDER: ICD-10-CM

## 2022-03-15 DIAGNOSIS — F41.9 ANXIETY: ICD-10-CM

## 2022-03-16 RX ORDER — PRAMIPEXOLE DIHYDROCHLORIDE 0.75 MG/1
TABLET ORAL
Qty: 90 TABLET | Refills: 0 | Status: SHIPPED | OUTPATIENT
Start: 2022-03-16 | End: 2022-06-26

## 2022-03-16 RX ORDER — CLONAZEPAM 0.5 MG/1
TABLET ORAL
Qty: 45 TABLET | Refills: 0 | Status: SHIPPED | OUTPATIENT
Start: 2022-03-16 | End: 2022-04-25

## 2022-03-16 NOTE — TELEPHONE ENCOUNTER
Orders Placed This Encounter    clonazePAM (KlonoPIN) 0.5 mg tablet     Sig: TAKE 1 AND 1/2 TABLET BY MOUTH EVERY NIGHT AT BEDTIME AS NEEDED     Dispense:  45 Tablet     Refill:  0    pramipexole (MIRAPEX) 0.75 mg tablet     Sig: TAKE ONE TABLET BY MOUTH EVERY DAY     Dispense:  90 Tablet     Refill:  0        reviewed 3/16/2022

## 2022-03-19 PROBLEM — F32.A MILD DEPRESSION: Status: ACTIVE | Noted: 2018-12-17

## 2022-04-04 ENCOUNTER — TRANSCRIBE ORDER (OUTPATIENT)
Dept: SCHEDULING | Age: 67
End: 2022-04-04

## 2022-04-04 DIAGNOSIS — Z12.31 VISIT FOR SCREENING MAMMOGRAM: Primary | ICD-10-CM

## 2022-04-11 ENCOUNTER — HOSPITAL ENCOUNTER (OUTPATIENT)
Dept: MAMMOGRAPHY | Age: 67
Discharge: HOME | End: 2022-04-11
Attending: INTERNAL MEDICINE
Payer: MEDICARE

## 2022-04-11 DIAGNOSIS — Z12.31 VISIT FOR SCREENING MAMMOGRAM: ICD-10-CM

## 2022-04-11 PROCEDURE — 77063 BREAST TOMOSYNTHESIS BI: CPT

## 2022-04-15 ENCOUNTER — TRANSCRIBE ORDER (OUTPATIENT)
Dept: SCHEDULING | Age: 67
End: 2022-04-15

## 2022-04-15 DIAGNOSIS — Z78.0 ASYMPTOMATIC MENOPAUSAL STATE: Primary | ICD-10-CM

## 2022-04-25 DIAGNOSIS — G47.9 SLEEP DISORDER: ICD-10-CM

## 2022-04-25 DIAGNOSIS — F41.9 ANXIETY: ICD-10-CM

## 2022-04-25 RX ORDER — CLONAZEPAM 0.5 MG/1
TABLET ORAL
Qty: 10 TABLET | Refills: 0 | Status: SHIPPED | OUTPATIENT
Start: 2022-04-25 | End: 2022-06-03

## 2022-04-25 NOTE — TELEPHONE ENCOUNTER
PT LEAVING TOWN TOMORROW. Requesting short script of her medication. Needs to pick it up today or have med rx to pharmacy where she is traveling. Requesting call back.     Requested Prescriptions     Pending Prescriptions Disp Refills    clonazePAM (KlonoPIN) 0.5 mg tablet [Pharmacy Med Name: CLONAZEPAM 0.5 MG TABLET] 45 Tablet 0     Sig: TAKE 1 AND 1/2 TABLET BY MOUTH EVERY NIGHT AT BEDTIME AS NEEDED       [address] - [address] [place] [address]

## 2022-06-03 DIAGNOSIS — F41.9 ANXIETY: ICD-10-CM

## 2022-06-03 DIAGNOSIS — G47.9 SLEEP DISORDER: ICD-10-CM

## 2022-06-03 RX ORDER — CLONAZEPAM 0.5 MG/1
TABLET ORAL
Qty: 45 TABLET | Refills: 0 | Status: SHIPPED | OUTPATIENT
Start: 2022-06-03 | End: 2022-07-21

## 2022-06-03 NOTE — TELEPHONE ENCOUNTER
Orders Placed This Encounter    clonazePAM (KlonoPIN) 0.5 mg tablet     Sig: TAKE 1 AND 1/2 TABLET BY MOUTH EVERY NIGHT AT BEDTIME AS NEEDED     Dispense:  45 Tablet     Refill:  0        reviewed 6/3/2022

## 2022-07-20 DIAGNOSIS — F41.9 ANXIETY: ICD-10-CM

## 2022-07-20 DIAGNOSIS — G47.9 SLEEP DISORDER: ICD-10-CM

## 2022-07-21 RX ORDER — CLONAZEPAM 0.5 MG/1
TABLET ORAL
Qty: 45 TABLET | Refills: 0 | Status: SHIPPED | OUTPATIENT
Start: 2022-07-21 | End: 2022-08-31

## 2022-08-31 DIAGNOSIS — G47.9 SLEEP DISORDER: ICD-10-CM

## 2022-08-31 DIAGNOSIS — F41.9 ANXIETY: ICD-10-CM

## 2022-08-31 RX ORDER — CLONAZEPAM 0.5 MG/1
TABLET ORAL
Qty: 45 TABLET | Refills: 0 | Status: SHIPPED | OUTPATIENT
Start: 2022-08-31 | End: 2022-10-03

## 2022-08-31 NOTE — TELEPHONE ENCOUNTER
Orders Placed This Encounter    clonazePAM (KlonoPIN) 0.5 mg tablet     Sig: TAKE 1 AND 1/2 TABLET BY MOUTH EVERY NIGHT AT BEDTIME AS NEEDED     Dispense:  45 Tablet     Refill:  0        reviewed 8/31/2022 Island Pedicle Flap With Canthal Suspension Text: The defect edges were debeveled with a #15 scalpel blade.  Given the location of the defect, shape of the defect and the proximity to free margins an island pedicle advancement flap was deemed most appropriate.  Using a sterile surgical marker, an appropriate advancement flap was drawn incorporating the defect, outlining the appropriate donor tissue and placing the expected incisions within the relaxed skin tension lines where possible. The area thus outlined was incised deep to adipose tissue with a #15 scalpel blade.  The skin margins were undermined to an appropriate distance in all directions around the primary defect and laterally outward around the island pedicle utilizing iris scissors.  There was minimal undermining beneath the pedicle flap. A suspension suture was placed in the canthal tendon to prevent tension and prevent ectropion.

## 2022-09-22 ENCOUNTER — HOSPITAL ENCOUNTER (OUTPATIENT)
Dept: BONE DENSITY | Age: 67
Discharge: HOME | End: 2022-09-22
Payer: MEDICARE

## 2022-09-22 DIAGNOSIS — Z78.0 ASYMPTOMATIC MENOPAUSAL STATE: ICD-10-CM

## 2022-09-22 PROCEDURE — 77080 DXA BONE DENSITY AXIAL: CPT

## 2022-10-03 DIAGNOSIS — G47.9 SLEEP DISORDER: ICD-10-CM

## 2022-10-03 DIAGNOSIS — F41.9 ANXIETY: ICD-10-CM

## 2022-10-03 RX ORDER — CLONAZEPAM 0.5 MG/1
TABLET ORAL
Qty: 45 TABLET | Refills: 0 | Status: SHIPPED | OUTPATIENT
Start: 2022-10-03

## 2022-10-04 NOTE — TELEPHONE ENCOUNTER
Orders Placed This Encounter    clonazePAM (KlonoPIN) 0.5 mg tablet     Sig: TAKE 1 AND 1/2 TABLET BY MOUTH EVERY NIGHT AT BEDTIME AS NEEDED     Dispense:  45 Tablet     Refill:  0        reviewed 10/3/2022

## 2022-11-07 ENCOUNTER — HOSPITAL ENCOUNTER (OUTPATIENT)
Dept: GENERAL RADIOLOGY | Age: 67
Discharge: HOME | End: 2022-11-07
Attending: INTERNAL MEDICINE
Payer: MEDICARE

## 2022-11-07 ENCOUNTER — OFFICE VISIT (OUTPATIENT)
Dept: INTERNAL MEDICINE CLINIC | Age: 67
End: 2022-11-07
Payer: MEDICARE

## 2022-11-07 VITALS
WEIGHT: 104 LBS | HEART RATE: 75 BPM | HEIGHT: 64 IN | TEMPERATURE: 97.5 F | OXYGEN SATURATION: 98 % | BODY MASS INDEX: 17.75 KG/M2 | RESPIRATION RATE: 16 BRPM | DIASTOLIC BLOOD PRESSURE: 69 MMHG | SYSTOLIC BLOOD PRESSURE: 109 MMHG

## 2022-11-07 DIAGNOSIS — F33.8 SEASONAL AFFECTIVE DISORDER (HCC): ICD-10-CM

## 2022-11-07 DIAGNOSIS — G25.81 RESTLESS LEGS SYNDROME: ICD-10-CM

## 2022-11-07 DIAGNOSIS — M79.641 RIGHT HAND PAIN: ICD-10-CM

## 2022-11-07 DIAGNOSIS — G47.9 SLEEP DISTURBANCE: ICD-10-CM

## 2022-11-07 DIAGNOSIS — M81.0 AGE-RELATED OSTEOPOROSIS WITHOUT CURRENT PATHOLOGICAL FRACTURE: ICD-10-CM

## 2022-11-07 DIAGNOSIS — E78.2 MIXED HYPERLIPIDEMIA: ICD-10-CM

## 2022-11-07 DIAGNOSIS — Z13.31 SCREENING FOR DEPRESSION: ICD-10-CM

## 2022-11-07 DIAGNOSIS — Z79.899 ENCOUNTER FOR LONG-TERM (CURRENT) USE OF MEDICATIONS: ICD-10-CM

## 2022-11-07 DIAGNOSIS — Z00.00 MEDICARE ANNUAL WELLNESS VISIT, SUBSEQUENT: Primary | ICD-10-CM

## 2022-11-07 PROCEDURE — G0439 PPPS, SUBSEQ VISIT: HCPCS | Performed by: INTERNAL MEDICINE

## 2022-11-07 PROCEDURE — G9899 SCRN MAM PERF RSLTS DOC: HCPCS | Performed by: INTERNAL MEDICINE

## 2022-11-07 PROCEDURE — G8419 CALC BMI OUT NRM PARAM NOF/U: HCPCS | Performed by: INTERNAL MEDICINE

## 2022-11-07 PROCEDURE — 73130 X-RAY EXAM OF HAND: CPT

## 2022-11-07 PROCEDURE — G9717 DOC PT DX DEP/BP F/U NT REQ: HCPCS | Performed by: INTERNAL MEDICINE

## 2022-11-07 PROCEDURE — 1101F PT FALLS ASSESS-DOCD LE1/YR: CPT | Performed by: INTERNAL MEDICINE

## 2022-11-07 PROCEDURE — G0444 DEPRESSION SCREEN ANNUAL: HCPCS | Performed by: INTERNAL MEDICINE

## 2022-11-07 PROCEDURE — G8536 NO DOC ELDER MAL SCRN: HCPCS | Performed by: INTERNAL MEDICINE

## 2022-11-07 PROCEDURE — 99213 OFFICE O/P EST LOW 20 MIN: CPT | Performed by: INTERNAL MEDICINE

## 2022-11-07 PROCEDURE — G8427 DOCREV CUR MEDS BY ELIG CLIN: HCPCS | Performed by: INTERNAL MEDICINE

## 2022-11-07 PROCEDURE — 1090F PRES/ABSN URINE INCON ASSESS: CPT | Performed by: INTERNAL MEDICINE

## 2022-11-07 PROCEDURE — G0463 HOSPITAL OUTPT CLINIC VISIT: HCPCS | Performed by: INTERNAL MEDICINE

## 2022-11-07 PROCEDURE — 3017F COLORECTAL CA SCREEN DOC REV: CPT | Performed by: INTERNAL MEDICINE

## 2022-11-07 RX ORDER — CHOLECALCIFEROL (VITAMIN D3) 125 MCG
2000 CAPSULE ORAL DAILY
COMMUNITY

## 2022-11-07 NOTE — PATIENT INSTRUCTIONS
Medicare Wellness Visit, Female     The best way to live healthy is to have a lifestyle where you eat a well-balanced diet, exercise regularly, limit alcohol use, and quit all forms of tobacco/nicotine, if applicable. Regular preventive services are another way to keep healthy. Preventive services (vaccines, screening tests, monitoring & exams) can help personalize your care plan, which helps you manage your own care. Screening tests can find health problems at the earliest stages, when they are easiest to treat. [facility] follows the current, evidence-based guidelines published by the [facility] Kin[facility] ([facility]) when recommending preventive services for our patients. Because we follow these guidelines, sometimes recommendations change over time as research supports it. (For example, mammograms used to be recommended annually. Even though Medicare will still pay for an annual mammogram, the newer guidelines recommend a mammogram every two years for women of average risk). Of course, you and your doctor may decide to screen more often for some diseases, based on your risk and your co-morbidities (chronic disease you are already diagnosed with). Preventive services for you include:  - Medicare offers their members a free annual wellness visit, which is time for you and your primary care provider to discuss and plan for your preventive service needs. Take advantage of this benefit every year!  -All adults over the age of 72 should receive the recommended pneumonia vaccines. Current USPSTF guidelines recommend a series of two vaccines for the best pneumonia protection.   -All adults should have a flu vaccine yearly and a tetanus vaccine every 10 years.   -All adults age 48 and older should receive the shingles vaccines (series of two vaccines).       -All adults age 38-68 who are overweight should have a diabetes screening test once every three years.   -All adults born between [birth year] and 1965 should be screened once for Hepatitis C.  -Other screening tests and preventive services for persons with diabetes include: an eye exam to screen for diabetic retinopathy, a kidney function test, a foot exam, and stricter control over your cholesterol.   -Cardiovascular screening for adults with routine risk involves an electrocardiogram (ECG) at intervals determined by your doctor.   -Colorectal cancer screenings should be done for adults age 54-65 with no increased risk factors for colorectal cancer. There are a number of acceptable methods of screening for this type of cancer. Each test has its own benefits and drawbacks. Discuss with your doctor what is most appropriate for you during your annual wellness visit. The different tests include: colonoscopy (considered the best screening method), a fecal occult blood test, a fecal DNA test, and sigmoidoscopy.    -A bone mass density test is recommended when a woman turns 65 to screen for osteoporosis. This test is only recommended one time, as a screening. Some providers will use this same test as a disease monitoring tool if you already have osteoporosis. -Breast cancer screenings are recommended every other year for women of normal risk, age 54-69.  -Cervical cancer screenings for women over age 72 are only recommended with certain risk factors.

## 2022-11-07 NOTE — PROGRESS NOTES
Assessment/Plan:     1. Seasonal affective disorder ([facility] [address] [address]  -doing well since retiring. She would like to hold off on starting celexa this fall.     - CBC WITH AUTOMATED DIFF; Future  - METABOLIC PANEL, COMPREHENSIVE; Future  - TSH 3RD GENERATION; Future    2. Restless legs syndrome  -check Cbc  -her evening dose of mirapex not holding through the evening. Taking it around 8pm and going to bed around 10pm.  Recommended she take it closer to her bedtime.    - CBC WITH AUTOMATED DIFF; Future    3. Age-related osteoporosis without current pathological fracture  -followed by [facility] [facility].   -she was referred to endocrine. Has not established appointment. 4. Encounter for long-term (current) use of medications      5. Sleep disturbance  -using clonazepam nightly. - monitored     6. Right hand pain  -pain in hand after fall about 3 weeks ago.  -will get xray to rule out fracture. - XR HAND RT MIN 3 V; Future    7. Medicare annual wellness visit, subsequent  -completed today.   -[facility] decision maker reviewed with patient and updated. - DEPRESSION SCREEN ANNUAL    8. Screening for depression    - DEPRESSION SCREEN ANNUAL    9. Mixed hyperlipidemia  -diet controlled. - CBC WITH AUTOMATED DIFF; Future  - METABOLIC PANEL, COMPREHENSIVE; Future  - LIPID PANEL;  Future  - URINALYSIS W/ REFLEX CULTURE; Future     Orders Placed This Encounter    ANNUAL DEPRESSION SCREEN 8-15 MIN    XR HAND RT MIN 3 V     Standing Status:   Future     Number of Occurrences:   1     Standing Expiration Date:   12/7/2023    CBC WITH AUTOMATED DIFF     Standing Status:   Future     Number of Occurrences:   1     Standing Expiration Date:   25/5/3842    METABOLIC PANEL, COMPREHENSIVE     Standing Status:   Future     Number of Occurrences:   1     Standing Expiration Date:   11/7/2023    LIPID PANEL     Standing Status:   Future     Number of Occurrences:   1     Standing Expiration Date:   11/7/2023    TSH 3RD GENERATION     Standing Status:   Future     Number of Occurrences:   1     Standing Expiration Date:   11/7/2023    URINALYSIS W/ REFLEX CULTURE     Standing Status:   Future     Number of Occurrences:   1     Standing Expiration Date:   11/7/2023    cholecalciferol, vitamin D3, (Vitamin D3) 50 mcg (2,000 unit) tab     Sig: Take 2,000 Units by mouth daily. OTHER     Sig: Anatoly pro once daily        Follow-up Disposition:     Follow up 6 months. Subjective:      Carol Jasmine is a 75 y.o. female who presents today for her Medicare Wellness Exam and follow up of her mild depression     Since last visit :  - not taking fosamax and celexa    -slipped while getting out of kayak and caught fall right right hand. About 3 weeks ago.     -had DXA done with [facility] [facility] in September, 2022. - shows osteoporosis in right hip and decrease in left forearm. Stopped fosamax a couple months after she started in 9/2021. She was referred to endocrinology.    -she takes her mirapex about 2 hours prior to going to bed. Finding that she wakes around 3am as the medication has worn off. It is about 8 hours after she has taken the med.     -she would like to hold on restarting her celexa this fall for her seasonal affective disorder. She has retired. Is able to get sunlight more during the day instead of being in her office. Patient Care Team:  -Dr. Simon Preciado, NP- gyn, osteoporosis  -[facility]          Objective:      Wt Readings from Last 3 Encounters:   11/07/22 104 lb (47.2 kg)   12/01/21 106 lb 3.2 oz (48.2 kg)   06/01/21 109 lb 9.6 oz (49.7 kg)     BP Readings from Last 3 Encounters:   12/01/21 103/64   06/01/21 101/66   10/26/20 107/72     Visit Vitals  /69   Pulse 75   Temp 97.5 °F (36.4 °C) (Temporal)   Resp 16   Ht 5' 3.75\" (1.619 m)   Wt 104 lb (47.2 kg)   SpO2 98%   BMI 17.99 kg/m²     General appearance: alert, cooperative, no distress, appears stated age  Head: Normocephalic, without obvious abnormality, atraumatic  Ears: normal TM's and external ear canals AU  Neck: supple, symmetrical, trachea midline, no adenopathy, no carotid bruit, and no JVD  Lungs: clear to auscultation bilaterally  Heart: regular rate and rhythm, S1, S2 normal, no murmur, click, rub or gallop  Abdomen: soft, non-tender. Bowel sounds normal. No masses,  no organomegaly  Extremities: extremities normal, atraumatic, no cyanosis or edema  Pulses: 2+ and symmetric              Disclaimer:  Return if symptoms worsen or fail to improve. Advised patient to call back or return to office if symptoms worsen/change/persist.     Discussed expected course/resolution/complications of diagnosis in detail with patient. Medication risks/benefits/costs/interactions/alternatives discussed with patient. Patient was given an after visit summary which includes diagnoses, current medications, & vitals. Patient expressed understanding with the diagnosis and plan. This is the Subsequent Medicare Annual Wellness Exam, performed 12 months or more after the Initial AWV or the last Subsequent AWV    I have reviewed the patient's medical history in detail and updated the computerized patient record. Assessment/Plan   Education and counseling provided:  Are appropriate based on today's review and evaluation    1. Medicare annual wellness visit, subsequent  -     [facility]  2. Seasonal affective disorder ([facility] [address]  3. Restless legs syndrome  4. Age-related osteoporosis without current pathological fracture  5. Encounter for long-term (current) use of medications  6. Sleep disturbance  7. Right hand pain  8. History of bone density study  9.  Screening for depression  -     DEPRESSION SCREEN ANNUAL       Depression Risk Factor Screening     3 most recent PHQ Screens 11/7/2022   PHQ Not Done -   Little interest or pleasure in doing things Not at all   Feeling down, depressed, irritable, or hopeless Not at all   Total Score PHQ 2 0       Alcohol & Drug Abuse Risk Screen   Do you average more than 1 drink per night or more than 7 drinks a week?: (P) No  On any one occasion in the past three months have you had more than 3 drinks containing alcohol?: (P) No          Functional Ability and Level of Safety   Hearing:  Hearing: (P) Patient reports hearing is good    Activities of Daily Living: The home contains: (P) no safety equipment  Functional ADLs: (P) Patient does total self care   Ambulation:  Patient ambulates: (P) with no difficulty   Fall Risk:  Fall Risk Assessment, last 12 mths 11/7/2022   Able to walk? Yes   Fall in past 12 months? 1   Do you feel unsteady? 0   Are you worried about falling 1   Is TUG test greater than 12 seconds? -   Is the gait abnormal? -   Number of falls in past 12 months 1   Fall with injury?  0     Abuse Screen:  Do you ever feel afraid of your partner?: No  Are you in a relationship with someone who physically or mentally threatens you?: No  Is it safe for you to go home?: Yes      Cognitive Screening   Has your family/caregiver stated any concerns about your memory?: (P) No       Health Maintenance Due     Health Maintenance Due   Topic Date Due    Shingrix Vaccine Age 49> (1 of 2) Never done    DTaP/Tdap/Td series (2 - Td or Tdap) 12/17/2018    COVID-19 Vaccine (3 - Booster for Jessika April series) 06/11/2021       Patient Care Team   Patient Care Team:  Bobby Prater MD as PCP - General (Internal Medicine Physician)  Bobby Prater MD as PCP - [facility] Empaneled Provider  Cari Valdivia MD as Physician (Gastroenterology)    History     Patient Active Problem List   Diagnosis Code    Restless legs syndrome G25.81    Sleep disorder G47.9    Age-related osteoporosis without current pathological fracture M81.0    Celiac disease K90.0    Iron deficiency E61.1    Pap smear for cervical cancer screening Z12.4    History of screening mammography Z92.89    History of bone density study Z92.89    Colon cancer screening Z12.11    Herpetic lesion B00.9 Lumbar disc disease with radiculopathy M51.16    Depression with anxiety F41.8    Advance directive discussed with patient Z71.89    Mild depression F32. A     Past Medical History:   Diagnosis Date    Arthritis     Left great toe, R knee, L thumb    Celiac syndrome     by blood testing    Iron deficiency     Restless legs       Past Surgical History:   Procedure Laterality Date    ENDOSCOPY, COLON, DIAGNOSTIC   &     benign sessile polyp in 2013 - repeat in 5 years    HX GYN      lap for blocked fallopian tube    HX GYN           HX ORTHOPAEDIC      R knee menisectomy     Current Outpatient Medications   Medication Sig Dispense Refill    cholecalciferol, vitamin D3, (Vitamin D3) 50 mcg (2,000 unit) tab Take 2,000 Units by mouth daily. OTHER Anatoly pro once daily      clonazePAM (KlonoPIN) 0.5 mg tablet TAKE 1 AND 1/2 TABLET BY MOUTH EVERY NIGHT AT BEDTIME AS NEEDED 45 Tablet 0    pramipexole (MIRAPEX) 0.75 mg tablet TAKE ONE TABLET BY MOUTH EVERY DAY 90 Tablet 0    estradiol (VAGIFEM) 10 mcg tab vaginal tablet Insert 10 mcg into vagina every Tuesday and Friday. estradiol 0.025 mg/24 hr ptsw Apply  to affected area. Every 5 days      progesterone (PROMETRIUM) 100 mg capsule Take 100 mg by mouth daily. valACYclovir (VALTREX) 500 mg tablet Take 1 Tab by mouth two (2) times a day. For 1-3 days as needed. 30 Tab 3    pramipexole (Mirapex) 0.5 mg tablet 1 to 1 1/2 tab nightly. 45 Tablet 5    triamcinolone acetonide (KENALOG) 0.1 % topical cream Apply  to affected area two (2) times a day.  use thin layer 30 g 0     Allergies   Allergen Reactions    Ambien Cr [Zolpidem] Other (comments)     Agitation & mood lability; higher dosages only    Gluten Other (comments)    Requip [Ropinirole] Nausea Only       Family History   Problem Relation Age of Onset    Kidney Disease Mother     Hypertension Mother     Heart Failure Mother     Stroke Father     OSTEOARTHRITIS Father     Heart Failure Father     Thyroid Disease Sister     OSTEOARTHRITIS Paternal Grandmother      Social History     Tobacco Use    Smoking status: Never    Smokeless tobacco: Never   Substance Use Topics    Alcohol use:  Yes     Alcohol/week: 2.5 - 3.3 standard drinks     Types: 3 - 4 Glasses of wine per week     Comment: Rarely         Faith Baca MD

## 2022-11-07 NOTE — PROGRESS NOTES
Verified name and birth date for privacy precautions. Chart reviewed in preparation for today's visit.      Chief Complaint   Patient presents with    Annual Wellness Visit          Health Maintenance Due   Topic    Shingrix Vaccine Age 50> (1 of 2)    DTaP/Tdap/Td series (2 - Td or Tdap)    COVID-19 Vaccine (3 - Booster for Moderna series)    Flu Vaccine (1)    Medicare Yearly Exam          Wt Readings from Last 3 Encounters:   11/07/22 104 lb (47.2 kg)   12/01/21 106 lb 3.2 oz (48.2 kg)   06/01/21 109 lb 9.6 oz (49.7 kg)     Temp Readings from Last 3 Encounters:   11/07/22 97.5 °F (36.4 °C) (Temporal)   12/01/21 97.1 °F (36.2 °C) (Temporal)   06/01/21 97.5 °F (36.4 °C) (Temporal)     BP Readings from Last 3 Encounters:   11/07/22 109/69   12/01/21 103/64   06/01/21 101/66     Pulse Readings from Last 3 Encounters:   11/07/22 75   12/01/21 78   06/01/21 82         Learning Assessment:  :     Learning Assessment 1/29/2020 6/19/2019 6/22/2018 6/16/2017 2/6/2015 12/2/2013 2/15/2013   PRIMARY LEARNER Patient Patient Patient Patient Patient Patient Patient   HIGHEST LEVEL OF EDUCATION - PRIMARY LEARNER  4 YEARS OF COLLEGE > 4 YEARS OF COLLEGE > 4 YEARS OF COLLEGE - > 4 YEARS OF COLLEGE 4 YEARS OF COLLEGE -   BARRIERS PRIMARY LEARNER NONE NONE NONE - NONE NONE -   CO-LEARNER CAREGIVER No No No - No No -   PRIMARY LANGUAGE ENGLISH ENGLISH ENGLISH ENGLISH ENGLISH ENGLISH ENGLISH    NEED - - - - No - -   LEARNER PREFERENCE PRIMARY DEMONSTRATION VIDEOS VIDEOS DEMONSTRATION DEMONSTRATION DEMONSTRATION DEMONSTRATION     - DEMONSTRATION - - - - READING     - - - - - - PICTURES   LEARNING SPECIAL TOPICS - - - - no - -   ANSWERED BY patient  patient patient patient patient self patient   RELATIONSHIP SELF SELF SELF SELF SELF SELF SELF       Depression Screening:  :     3 most recent PHQ Screens 11/7/2022   PHQ Not Done -   Little interest or pleasure in doing things Not at all   Feeling down, depressed, irritable, or hopeless Not at all   Total Score PHQ 2 0       Fall Risk Assessment:  :     Fall Risk Assessment, last 12 mths 11/7/2022   Able to walk? Yes   Fall in past 12 months? 1   Do you feel unsteady? 0   Are you worried about falling 1   Is TUG test greater than 12 seconds? -   Is the gait abnormal? -   Number of falls in past 12 months 1   Fall with injury? 0       Abuse Screening:  :     Abuse Screening Questionnaire 11/7/2022 11/5/2022 1/29/2020 6/19/2019 12/14/2018 6/18/2018 9/15/2017   Do you ever feel afraid of your partner? N N N N N N N   Are you in a relationship with someone who physically or mentally threatens you? N N N N N N N   Is it safe for you to go home?  Spence Barthel

## 2022-11-08 DIAGNOSIS — G47.9 SLEEP DISORDER: ICD-10-CM

## 2022-11-08 DIAGNOSIS — F41.9 ANXIETY: ICD-10-CM

## 2022-11-08 DIAGNOSIS — G25.81 RESTLESS LEGS SYNDROME: ICD-10-CM

## 2022-11-08 LAB
ALBUMIN SERPL-MCNC: 4 G/DL (ref 3.5–5)
ALBUMIN/GLOB SERPL: 1.3 {RATIO} (ref 1.1–2.2)
ALP SERPL-CCNC: 63 U/L (ref 45–117)
ALT SERPL-CCNC: 25 U/L (ref 12–78)
ANION GAP SERPL CALC-SCNC: 5 MMOL/L (ref 5–15)
APPEARANCE UR: CLEAR
AST SERPL-CCNC: 24 U/L (ref 15–37)
BACTERIA URNS QL MICRO: NEGATIVE /HPF
BASOPHILS # BLD: 0.1 K/UL (ref 0–0.1)
BASOPHILS NFR BLD: 1 % (ref 0–1)
BILIRUB SERPL-MCNC: 0.5 MG/DL (ref 0.2–1)
BILIRUB UR QL: NEGATIVE
BUN SERPL-MCNC: 22 MG/DL (ref 6–20)
BUN/CREAT SERPL: 32 (ref 12–20)
CALCIUM SERPL-MCNC: 9.4 MG/DL (ref 8.5–10.1)
CHLORIDE SERPL-SCNC: 104 MMOL/L (ref 97–108)
CHOLEST SERPL-MCNC: 215 MG/DL
CO2 SERPL-SCNC: 30 MMOL/L (ref 21–32)
COLOR UR: NORMAL
CREAT SERPL-MCNC: 0.68 MG/DL (ref 0.55–1.02)
DIFFERENTIAL METHOD BLD: ABNORMAL
EOSINOPHIL # BLD: 0.3 K/UL (ref 0–0.4)
EOSINOPHIL NFR BLD: 6 % (ref 0–7)
EPITH CASTS URNS QL MICRO: NORMAL /LPF
ERYTHROCYTE [DISTWIDTH] IN BLOOD BY AUTOMATED COUNT: 13.2 % (ref 11.5–14.5)
GLOBULIN SER CALC-MCNC: 3.2 G/DL (ref 2–4)
GLUCOSE SERPL-MCNC: 82 MG/DL (ref 65–100)
GLUCOSE UR STRIP.AUTO-MCNC: NEGATIVE MG/DL
HCT VFR BLD AUTO: 38.1 % (ref 35–47)
HDLC SERPL-MCNC: 106 MG/DL
HDLC SERPL: 2 {RATIO} (ref 0–5)
HGB BLD-MCNC: 12 G/DL (ref 11.5–16)
HGB UR QL STRIP: NEGATIVE
HYALINE CASTS URNS QL MICRO: NORMAL /LPF (ref 0–5)
IMM GRANULOCYTES # BLD AUTO: 0 K/UL (ref 0–0.04)
IMM GRANULOCYTES NFR BLD AUTO: 0 % (ref 0–0.5)
KETONES UR QL STRIP.AUTO: NEGATIVE MG/DL
LDLC SERPL CALC-MCNC: 98 MG/DL (ref 0–100)
LEUKOCYTE ESTERASE UR QL STRIP.AUTO: NEGATIVE
LYMPHOCYTES # BLD: 1.6 K/UL (ref 0.8–3.5)
LYMPHOCYTES NFR BLD: 32 % (ref 12–49)
MCH RBC QN AUTO: 31.3 PG (ref 26–34)
MCHC RBC AUTO-ENTMCNC: 31.5 G/DL (ref 30–36.5)
MCV RBC AUTO: 99.5 FL (ref 80–99)
MONOCYTES # BLD: 0.5 K/UL (ref 0–1)
MONOCYTES NFR BLD: 9 % (ref 5–13)
NEUTS SEG # BLD: 2.5 K/UL (ref 1.8–8)
NEUTS SEG NFR BLD: 52 % (ref 32–75)
NITRITE UR QL STRIP.AUTO: NEGATIVE
NRBC # BLD: 0 K/UL (ref 0–0.01)
NRBC BLD-RTO: 0 PER 100 WBC
PH UR STRIP: 6.5 [PH] (ref 5–8)
PLATELET # BLD AUTO: 244 K/UL (ref 150–400)
PMV BLD AUTO: 11.5 FL (ref 8.9–12.9)
POTASSIUM SERPL-SCNC: 4.4 MMOL/L (ref 3.5–5.1)
PROT SERPL-MCNC: 7.2 G/DL (ref 6.4–8.2)
PROT UR STRIP-MCNC: NEGATIVE MG/DL
RBC # BLD AUTO: 3.83 M/UL (ref 3.8–5.2)
RBC #/AREA URNS HPF: NORMAL /HPF (ref 0–5)
SODIUM SERPL-SCNC: 139 MMOL/L (ref 136–145)
SP GR UR REFRACTOMETRY: 1.01 (ref 1–1.03)
TRIGL SERPL-MCNC: 55 MG/DL (ref ?–150)
TSH SERPL DL<=0.05 MIU/L-ACNC: 2.01 UIU/ML (ref 0.36–3.74)
UA: UC IF INDICATED,UAUC: NORMAL
UROBILINOGEN UR QL STRIP.AUTO: 0.2 EU/DL (ref 0.2–1)
VLDLC SERPL CALC-MCNC: 11 MG/DL
WBC # BLD AUTO: 4.9 K/UL (ref 3.6–11)
WBC URNS QL MICRO: NORMAL /HPF (ref 0–4)

## 2022-11-08 RX ORDER — CLONAZEPAM 0.5 MG/1
TABLET ORAL
Qty: 45 TABLET | Refills: 0 | Status: SHIPPED | OUTPATIENT
Start: 2022-11-08

## 2022-11-08 RX ORDER — CLONAZEPAM 0.5 MG/1
TABLET ORAL
Qty: 45 TABLET | Refills: 0 | OUTPATIENT
Start: 2022-11-08

## 2022-11-08 NOTE — TELEPHONE ENCOUNTER
Requested Prescriptions     Pending Prescriptions Disp Refills    clonazePAM (KlonoPIN) 0.5 mg tablet 45 Tablet 0    pramipexole (MIRAPEX) 0.75 mg tablet 90 Tablet 0     Sig: Take 1 Tablet by mouth daily.      [facility]  [phone]    [place]  [phone]

## 2022-11-08 NOTE — TELEPHONE ENCOUNTER
Orders Placed This Encounter    clonazePAM (KlonoPIN) 0.5 mg tablet     Sig: TAKE 1 AND 1/2 TABLET BY MOUTH EVERY NIGHT AT BEDTIME AS NEEDED     Dispense:  45 Tablet     Refill:  0        reviewed 11/8/2022

## 2022-11-09 ENCOUNTER — PATIENT MESSAGE (OUTPATIENT)
Dept: INTERNAL MEDICINE CLINIC | Age: 67
End: 2022-11-09

## 2022-12-05 DIAGNOSIS — G25.81 RESTLESS LEGS SYNDROME: ICD-10-CM

## 2022-12-05 RX ORDER — PRAMIPEXOLE DIHYDROCHLORIDE 0.75 MG/1
TABLET ORAL
Qty: 90 TABLET | Refills: 1 | Status: SHIPPED | OUTPATIENT
Start: 2022-12-05

## 2022-12-16 DIAGNOSIS — G47.9 SLEEP DISORDER: ICD-10-CM

## 2022-12-16 DIAGNOSIS — F41.9 ANXIETY: ICD-10-CM

## 2022-12-16 RX ORDER — CLONAZEPAM 0.5 MG/1
TABLET ORAL
Qty: 45 TABLET | Refills: 0 | OUTPATIENT
Start: 2022-12-16

## 2022-12-16 RX ORDER — CLONAZEPAM 0.5 MG/1
TABLET ORAL
Qty: 45 TABLET | Refills: 0 | Status: SHIPPED | OUTPATIENT
Start: 2022-12-16

## 2022-12-16 NOTE — TELEPHONE ENCOUNTER
Requested Prescriptions     Pending Prescriptions Disp Refills    clonazePAM (KlonoPIN) 0.5 mg tablet 45 Tablet 0     [facility] - [address] - [phone]

## 2022-12-27 ENCOUNTER — OFFICE VISIT (OUTPATIENT)
Dept: INTERNAL MEDICINE CLINIC | Age: 67
End: 2022-12-27
Payer: MEDICARE

## 2022-12-27 VITALS
WEIGHT: 104 LBS | BODY MASS INDEX: 17.75 KG/M2 | HEART RATE: 67 BPM | DIASTOLIC BLOOD PRESSURE: 68 MMHG | SYSTOLIC BLOOD PRESSURE: 114 MMHG | TEMPERATURE: 97.2 F | RESPIRATION RATE: 16 BRPM | HEIGHT: 64 IN | OXYGEN SATURATION: 98 %
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
## 2022-12-27 DIAGNOSIS — F33.8 SEASONAL AFFECTIVE DISORDER (HCC): ICD-10-CM

## 2022-12-27 DIAGNOSIS — R42 LIGHT HEADED: Primary | ICD-10-CM

## 2022-12-27 PROCEDURE — G8419 CALC BMI OUT NRM PARAM NOF/U: HCPCS | Performed by: INTERNAL MEDICINE

## 2022-12-27 PROCEDURE — 3017F COLORECTAL CA SCREEN DOC REV: CPT | Performed by: INTERNAL MEDICINE

## 2022-12-27 PROCEDURE — G8427 DOCREV CUR MEDS BY ELIG CLIN: HCPCS | Performed by: INTERNAL MEDICINE

## 2022-12-27 PROCEDURE — G9717 DOC PT DX DEP/BP F/U NT REQ: HCPCS | Performed by: INTERNAL MEDICINE

## 2022-12-27 PROCEDURE — G9899 SCRN MAM PERF RSLTS DOC: HCPCS | Performed by: INTERNAL MEDICINE

## 2022-12-27 PROCEDURE — 1090F PRES/ABSN URINE INCON ASSESS: CPT | Performed by: INTERNAL MEDICINE

## 2022-12-27 PROCEDURE — G8536 NO DOC ELDER MAL SCRN: HCPCS | Performed by: INTERNAL MEDICINE

## 2022-12-27 PROCEDURE — G0463 HOSPITAL OUTPT CLINIC VISIT: HCPCS | Performed by: INTERNAL MEDICINE

## 2022-12-27 PROCEDURE — 99214 OFFICE O/P EST MOD 30 MIN: CPT | Performed by: INTERNAL MEDICINE

## 2022-12-27 PROCEDURE — 1101F PT FALLS ASSESS-DOCD LE1/YR: CPT | Performed by: INTERNAL MEDICINE

## 2022-12-27 RX ORDER — CITALOPRAM 10 MG/1
15 TABLET ORAL DAILY
COMMUNITY
End: 2022-12-27 | Stop reason: SDUPTHER

## 2022-12-27 RX ORDER — CITALOPRAM 10 MG/1
15 TABLET ORAL DAILY
Qty: 135 TABLET | Refills: 1 | Status: SHIPPED | OUTPATIENT
Start: 2022-12-27

## 2022-12-27 NOTE — PROGRESS NOTES
Verified name and birth date for privacy precautions. Chart reviewed in preparation for today's visit.      Chief Complaint   Patient presents with    Dizziness    Fall     Injured knee and head today          Health Maintenance Due   Topic    Shingles Vaccine (1 of 2)    DTaP/Tdap/Td series (2 - Td or Tdap)    Pneumococcal 65+ years (1 - PCV)    COVID-19 Vaccine (3 - Booster for Moderna series)         Wt Readings from Last 3 Encounters:   12/27/22 104 lb (47.2 kg)   11/07/22 104 lb (47.2 kg)   12/01/21 106 lb 3.2 oz (48.2 kg)     Temp Readings from Last 3 Encounters:   12/27/22 97.2 °F (36.2 °C) (Temporal)   11/07/22 97.5 °F (36.4 °C) (Temporal)   12/01/21 97.1 °F (36.2 °C) (Temporal)     BP Readings from Last 3 Encounters:   12/27/22 114/68   11/07/22 109/69   12/01/21 103/64     Pulse Readings from Last 3 Encounters:   12/27/22 67   11/07/22 75   12/01/21 78         Learning Assessment:  :     Learning Assessment 1/29/2020 6/19/2019 6/22/2018 6/16/2017 2/6/2015 12/2/2013 2/15/2013   PRIMARY LEARNER Patient Patient Patient Patient Patient Patient Patient   HIGHEST LEVEL OF EDUCATION - PRIMARY LEARNER  4 YEARS OF COLLEGE > 4 YEARS OF COLLEGE > 4 YEARS OF COLLEGE - > 4 YEARS OF COLLEGE 4 YEARS OF COLLEGE -   BARRIERS PRIMARY LEARNER NONE NONE NONE - NONE NONE -   CO-LEARNER CAREGIVER No No No - No No -   PRIMARY LANGUAGE ENGLISH ENGLISH ENGLISH ENGLISH ENGLISH ENGLISH ENGLISH    NEED - - - - No - -   LEARNER PREFERENCE PRIMARY DEMONSTRATION VIDEOS VIDEOS DEMONSTRATION DEMONSTRATION DEMONSTRATION DEMONSTRATION     - DEMONSTRATION - - - - READING     - - - - - - PICTURES   LEARNING SPECIAL TOPICS - - - - no - -   ANSWERED BY patient  patient patient patient patient self patient   RELATIONSHIP SELF SELF SELF SELF SELF SELF SELF       Depression Screening:  :     3 most recent PHQ Screens 12/27/2022   PHQ Not Done -   Little interest or pleasure in doing things Not at all   Feeling down, depressed, irritable, or hopeless Not at all   Total Score PHQ 2 0       Fall Risk Assessment:  :     Fall Risk Assessment, last 12 mths 12/27/2022   Able to walk? Yes   Fall in past 12 months? 1   Do you feel unsteady? 0   Are you worried about falling 0   Is TUG test greater than 12 seconds? -   Is the gait abnormal? -   Number of falls in past 12 months -   Fall with injury? -       Abuse Screening:  :     Abuse Screening Questionnaire 12/27/2022 11/7/2022 11/5/2022 1/29/2020 6/19/2019 12/14/2018 6/18/2018   Do you ever feel afraid of your partner? N N N N N N N   Are you in a relationship with someone who physically or mentally threatens you? N N N N N N N   Is it safe for you to go home?  [facility]

## 2022-12-27 NOTE — PROGRESS NOTES
Assessment/Plan:     1. Light headed  -intermittent  -labs done on 11/7/2022 normal  -encouraged hydration. She is aware that she doesn't drink much water in the winter time. 2. Seasonal affective disorder ([facility] [address]  -has restarted her celexa. Refilled today. Celexa 15mg daily    3. Fall, initial encounter  -has hematoma/bruise on right side of head - recommended ice compressed for 20 minutes daily to twice daily  -advil 200mg every 6 hours as needed for pain.   -may continue use of brace on right knee for support. Orders Placed This Encounter             citalopram (CELEXA) 10 mg tablet     Sig: Take 1.5 Tablets by mouth daily. Dispense:  135 Tablet     Refill:  1     Please cut pills for patient. Follow-up Disposition:     Follow up in 6 months         Disclaimer:  Return if symptoms worsen or fail to improve. Advised patient to call back or return to office if symptoms worsen/change/persist.     Discussed expected course/resolution/complications of diagnosis in detail with patient. Medication risks/benefits/costs/interactions/alternatives discussed with patient. Patient was given an after visit summary which includes diagnoses, current medications, & vitals. Patient expressed understanding with the diagnosis and plan. Subjective:      Jesus Owen is a 75 y.o. female who presents today for intermittent episodes of lightheadedness      -episodic episodes of lightheadedness. Started a few weeks ago. With changes of position.     -had episode, just once, of double vision, fleeting.     -last eye exam was within the year.      -she fell this morning as she slipped on her deck. Her outdoor pipes froze and ruptured. May have been ice on deck. She hit her right knee and right side of her head on the deck. Having some neck pain now with movement.   She is wearing brace on her right knee.     -she also restarted use of her celexa in the past few weeks for her seasonal affective disorder.        Objective:       Visit Vitals  /68   Pulse 67   Temp 97.2 °F (36.2 °C) (Temporal)   Resp 16   Ht 5' 3.75\" (1.619 m)   Wt 104 lb (47.2 kg)   SpO2 98%   BMI 17.99 kg/m²     General appearance: alert, cooperative, no distress, appears stated age  Head: Normocephalic, bruise noted on right side just above ear  Eyes: negative  Ears: normal TM's and external ear canals AU  Neck: supple, symmetrical, trachea midline, no adenopathy, no carotid bruit, and no JVD  Lungs: clear to auscultation bilaterally  Heart: regular rate and rhythm, S1, S2 normal, no murmur, click, rub or gallop  Extremities: no edema

## 2023-01-17 ENCOUNTER — PATIENT MESSAGE (OUTPATIENT)
Dept: INTERNAL MEDICINE CLINIC | Age: 68
End: 2023-01-17

## 2023-01-29 DIAGNOSIS — F41.9 ANXIETY: ICD-10-CM

## 2023-01-29 DIAGNOSIS — G47.9 SLEEP DISORDER: ICD-10-CM

## 2023-01-30 RX ORDER — CLONAZEPAM 0.5 MG/1
TABLET ORAL
Qty: 45 TABLET | Refills: 0 | Status: SHIPPED | OUTPATIENT
Start: 2023-01-30

## 2023-01-30 NOTE — TELEPHONE ENCOUNTER
Orders Placed This Encounter    clonazePAM (KlonoPIN) 0.5 mg tablet     Sig: TAKE 1 AND 1/2 TABLET BY MOUTH EVERY NIGHT AT BEDTIME AS NEEDED     Dispense:  45 Tablet     Refill:  0        reviewed 1/30/2023

## 2023-02-16 ENCOUNTER — HOSPITAL ENCOUNTER (OUTPATIENT)
Dept: PREADMISSION TESTING | Age: 68
Discharge: HOME | End: 2023-02-16
Payer: MEDICARE

## 2023-02-16 VITALS
DIASTOLIC BLOOD PRESSURE: 61 MMHG | HEIGHT: 64 IN | WEIGHT: 100.97 LBS | SYSTOLIC BLOOD PRESSURE: 97 MMHG | TEMPERATURE: 98.2 F | BODY MASS INDEX: 17.24 KG/M2 | HEART RATE: 80 BPM

## 2023-02-16 LAB
ABO + RH BLD: NORMAL
ANION GAP SERPL CALC-SCNC: 4 MMOL/L (ref 5–15)
APPEARANCE UR: CLEAR
BACTERIA URNS QL MICRO: NEGATIVE /HPF
BILIRUB UR QL: NEGATIVE
BLOOD GROUP ANTIBODIES SERPL: NORMAL
BUN SERPL-MCNC: 31 MG/DL (ref 6–20)
BUN/CREAT SERPL: 47 (ref 12–20)
CALCIUM SERPL-MCNC: 9.9 MG/DL (ref 8.5–10.1)
CHLORIDE SERPL-SCNC: 105 MMOL/L (ref 97–108)
CO2 SERPL-SCNC: 29 MMOL/L (ref 21–32)
COLOR UR: NORMAL
CREAT SERPL-MCNC: 0.66 MG/DL (ref 0.55–1.02)
EPITH CASTS URNS QL MICRO: NORMAL /LPF
ERYTHROCYTE [DISTWIDTH] IN BLOOD BY AUTOMATED COUNT: 12.3 % (ref 11.5–14.5)
EST. AVERAGE GLUCOSE BLD GHB EST-MCNC: 114 MG/DL
GLUCOSE SERPL-MCNC: 87 MG/DL (ref 65–100)
GLUCOSE UR STRIP.AUTO-MCNC: NEGATIVE MG/DL
HBA1C MFR BLD: 5.6 % (ref 4–5.6)
HCT VFR BLD AUTO: 34.3 % (ref 35–47)
HGB BLD-MCNC: 11.2 G/DL (ref 11.5–16)
HGB UR QL STRIP: NEGATIVE
HYALINE CASTS URNS QL MICRO: NORMAL /LPF (ref 0–5)
INR PPP: 1 (ref 0.9–1.1)
KETONES UR QL STRIP.AUTO: NEGATIVE MG/DL
LEUKOCYTE ESTERASE UR QL STRIP.AUTO: NEGATIVE
MCH RBC QN AUTO: 31 PG (ref 26–34)
MCHC RBC AUTO-ENTMCNC: 32.7 G/DL (ref 30–36.5)
MCV RBC AUTO: 95 FL (ref 80–99)
NITRITE UR QL STRIP.AUTO: NEGATIVE
NRBC # BLD: 0 K/UL (ref 0–0.01)
NRBC BLD-RTO: 0 PER 100 WBC
PH UR STRIP: 5.5 (ref 5–8)
PLATELET # BLD AUTO: 201 K/UL (ref 150–400)
PMV BLD AUTO: 11.6 FL (ref 8.9–12.9)
POTASSIUM SERPL-SCNC: 4.5 MMOL/L (ref 3.5–5.1)
PROT UR STRIP-MCNC: NEGATIVE MG/DL
PROTHROMBIN TIME: 10.5 SEC (ref 9–11.1)
RBC # BLD AUTO: 3.61 M/UL (ref 3.8–5.2)
RBC #/AREA URNS HPF: NORMAL /HPF (ref 0–5)
SODIUM SERPL-SCNC: 138 MMOL/L (ref 136–145)
SP GR UR REFRACTOMETRY: >1.03
SPECIMEN EXP DATE BLD: NORMAL
UA: UC IF INDICATED,UAUC: NORMAL
UROBILINOGEN UR QL STRIP.AUTO: 0.2 EU/DL (ref 0.2–1)
WBC # BLD AUTO: 4.1 K/UL (ref 3.6–11)
WBC URNS QL MICRO: NORMAL /HPF (ref 0–4)

## 2023-02-16 PROCEDURE — 85610 PROTHROMBIN TIME: CPT

## 2023-02-16 PROCEDURE — 80048 BASIC METABOLIC PNL TOTAL CA: CPT

## 2023-02-16 PROCEDURE — 93005 ELECTROCARDIOGRAM TRACING: CPT

## 2023-02-16 PROCEDURE — 85027 COMPLETE CBC AUTOMATED: CPT

## 2023-02-16 PROCEDURE — 86900 BLOOD TYPING SEROLOGIC ABO: CPT

## 2023-02-16 PROCEDURE — 81001 URINALYSIS AUTO W/SCOPE: CPT

## 2023-02-16 PROCEDURE — 36415 COLL VENOUS BLD VENIPUNCTURE: CPT

## 2023-02-16 PROCEDURE — 83036 HEMOGLOBIN GLYCOSYLATED A1C: CPT

## 2023-02-16 RX ORDER — DICLOFENAC SODIUM 75 MG/1
TABLET, DELAYED RELEASE ORAL 2 TIMES DAILY
COMMUNITY

## 2023-02-16 NOTE — PERIOP NOTES
[address] [facility] INSTRUCTIONS  ORTHOPAEDIC    Surgery Date:   03/03/2023    Your surgeon's office or [facility] staff will call you between 4 PM- 8 PM the day before surgery with your arrival time. If your surgery is on a Monday, you will receive a call the preceding Friday. Please report to [facility] Patient Access/Admitting on the 1st floor. Bring your insurance card, photo identification, and any copayment (if applicable). If you are going home the same day of your surgery, you must have a responsible adult to drive you home. You need to have a responsible adult to stay with you the first 24 hours after surgery and you should not drive a car for 24 hours following your surgery. Do NOT eat any solid foods after midnight the night before surgery including candy, mints or gum. You may drink clear liquids from midnight until 1 hour prior to arrival time. You may drink up to 12 ounces at one time every 4 hours. Do NOT drink alcohol or smoke 24 hours before surgery. STOP smoking for 14 days prior as it helps with breathing and healing after surgery. If your arrival time is 3pm or later, you may eat a light breakfast before 8am (toast, bagel-no butter, black coffee, plain tea, fruit juice-no pulp) Please note special instructions, if applicable, below for medications. If you are being admitted to the hospital,please leave personal belongings/luggage in your car until you have an assigned hospital room number. Please wear comfortable clothes. Wear your glasses instead of contacts. We ask that all money, jewelry and valuables be left at home. Wear no make up, particularly mascara, the day of surgery. All body piercings, rings, and jewelry need to be removed and left at home. Please remove any nail polish or artificial nails from your fingernails. Please wear your hair loose or down. Please no pony-tails, buns, or any metal hair accessories.  If you shower the morning of surgery, please do not apply any lotions or powders afterwards. You may wear deodorant. Do not shave any body area within 24 hours of your surgery. Please follow all instructions to avoid any potential surgical cancellation. Should your physical condition change, (i.e. fever, cold, flu, etc.) please notify your surgeon as soon as possible. It is important to be on time. If a situation occurs where you may be delayed, please call:  [phone] / [phone] on the day of surgery. The Preadmission Testing staff can be reached at [phone]. Special instructions: BRING ADVANCE DIRECTIVE, IF COMPLETED    Current Outpatient Medications   Medication Sig    diclofenac EC (VOLTAREN) 75 mg EC tablet Take  by mouth two (2) times a day. OTHER     clonazePAM (KlonoPIN) 0.5 mg tablet TAKE 1 AND 1/2 TABLET BY MOUTH EVERY NIGHT AT BEDTIME AS NEEDED    citalopram (CELEXA) 10 mg tablet Take 1.5 Tablets by mouth daily. (Patient taking differently: Take 20 mg by mouth every morning.)    pramipexole (MIRAPEX) 0.75 mg tablet TAKE ONE TABLET BY MOUTH EVERY DAY (Patient taking differently: nightly.)    cholecalciferol, vitamin D3, 50 mcg (2,000 unit) tab Take 2,000 Units by mouth daily. OTHER Anatoly pro once daily    estradiol (VAGIFEM) 10 mcg tab vaginal tablet Insert 10 mcg into vagina. Every 5 days    estradiol 0.025 mg/24 hr ptsw Apply  to affected area. Every 5 days    progesterone (PROMETRIUM) 100 mg capsule Take 100 mg by mouth nightly. valACYclovir (VALTREX) 500 mg tablet Take 1 Tab by mouth two (2) times a day. For 1-3 days as needed. No current facility-administered medications for this encounter. YOU MUST ONLY TAKE THESE MEDICATIONS THE MORNING OF SURGERY WITH A SIP OF WATER: CITALOPRAM  Stop any non-steroidal anti-inflammatory drugs (i.e. Ibuprofen, Naproxen, Advil, Aleve) 3 days before surgery. You may take Tylenol. STOP all vitamins and herbal supplements 1 week prior to  surgery.     Preventing Infections Before and After - Your Surgery    IMPORTANT INSTRUCTIONS    You play an important role in your health and preparation for surgery. To reduce the germs on your skin you will need to shower with CHG soap (Chorhexidine gluconate 4%) two times before surgery. CHG soap (Hibiclens, Hex-A-Clens or store brand)  CHG soap will be provided at your Preadmission Testing (PAT) appointment. If you do not have a PAT appointment before surgery, you may arrange to  CHG soap from our office or purchase CHG soap at a pharmacy, grocery or department store. You need to purchase TWO 4 ounce bottles to use for your 2 showers. Steps to follow:  Stephany Vickie your hair with your normal shampoo and your body with regular soap and rinse well to remove shampoo and soap from your skin. Wet a clean washcloth and turn off the shower. Put CHG soap on washcloth and apply to your entire body from the neck down. Do not use on your head, face or private parts(genitals). Do not use CHG soap on open sores, wounds or areas of skin irritation. Wash you body gently for 5 minutes. Do not wash your skin too hard. This soap does not create lather. Pay special attention to your underarms and from your belly button to your feet. Turn the shower back on and rinse well to get CHG soap off your body. Pat your skin dry with a clean, dry towel. Do not apply lotions or moisturizer. Put on clean clothes and sleep on fresh bed sheets and do not allow pets to sleep with you. Shower with CHG soap 2 times before your surgery  The evening before your surgery  The morning of your surgery      Tips to help prevent infections after your surgery:  Protect your surgical wound from germs:  Hand washing is the most important thing you and your caregivers can do to prevent infections. Keep your bandage clean and dry! Do not touch your surgical wound.   Use clean, freshly washed towels and washcloths every time you shower; do not share bath linens with others. Until your surgical wound is healed, wear clothing and sleep on bed linens each day that are clean and freshly washed. Do not allow pets to sleep in your bed with you or touch your surgical wound. Do not smoke - smoking delays wound healing. This may be a good time to stop smoking. If you have diabetes, it is important for you to manage your blood sugar levels properly before your surgery as well as after your surgery. Poorly managed blood sugar levels slow down wound healing and prevent you from healing completely. Prevention of Infection  Testing for Staphylococcus aureus on your skin before surgery    Staphylococcus aureus (staph) is a common bacteria that is found on the body. It normally does not cause infection on healthy skin. Before surgery, you will be tested to see if you have staph by swabbing the inside of your nose. When you have an incision with surgery, the goal is to protect that incision from infection. Removal of the staph bacteria before surgery can decrease the risk of a surgical site infection. If your nose swab is positive for staph you will be called. Your treatment will include 2 steps:  Prescription for Mupirocin ointment to be used in each nostril twice a day for 5 days. Showering with Chlorhexidine (CHG) liquid soap for 5 days prior to surgery. How to use Mupirocin ointment in your nose   the prescription from your pharmacy. You will receive a large tube of ointment which will be big enough for all of your treatments. You will apply this ointment to each nostril 2 times a day for 5 days. Wash your hands with  gel or soap and water for 20 seconds before using ointment. Place a pea-sized amount of ointment on a cotton Q-tip. Apply ointment just inside of each nostril with the Q-tip. Do not push Q-tip or ointment deep inside you nose. Press your nostrils together and massage for a few seconds.   Wash your hands with  gel or soap and water after you are finished. Do not get ointment near your eyes. If it gets into your eyes, rinse them with cool water. If you need to use nasal spray, clean the tip of the bottle with alcohol before use and do not use both at the same time. If you are scheduled for COVID testing during the 5 days, do NOT apply morning dose until after the COVID test has been performed. How to use Chlorhexidine (CHG) 4% liquid soap  Purchase an 8 ounce bottle of CHG liquid soap (Chlorhexidine 4%, Hibiclens, Hex-A-Clens or store brand) at a pharmacy or grocery store. Wash your hair with your normal shampoo and your body with regular soap and rinse well to remove shampoo and soap from your skin. Wet a clean washcloth and turn off the shower. Put CHG soap on washcloth and apply to your entire body from the neck down. Do not use on your head, face or private parts(genitals). Do not use CHG soap on open sores, wounds or areas of skin irritation. Wash your body gently for 5 minutes. Do not wash your skin too hard. This soap does not create lather. Pay special attention to your underarms and from your belly button to your feet. Turn the shower back on and rinse well to get CHG soap off your body. Pat your skin dry with a clean, dry towel. Do not apply lotions or moisturizer. Put on clean clothes and sleep on fresh bed sheets the night before surgery. Do not allow pets to sleep with you. Eating and Drinking Before Surgery    You may eat a regular dinner at the usual time on the day before your surgery. Do NOT eat any solid foods after midnight unless your arrival time at the hospital is 3pm or later. You may drink clear liquids only from 12 midnight until 1 hours prior to your arrival time at the hospital on the day of your surgery. Do NOT drink alcohol.   Clear liquids include:  Water  Fruit juices without pulp( i.e. apple juice)  Carbonated beverages  Black coffee (no cream/milk)  Tea (no cream/milk)  Gatorade  You may drink up to 12-16 ounces at one time every 4 hours between the hours of midnight and 1 hour before your arrival time at the hospital. Example- if your arrival time at the hospital is 6am, you may drink 12-16 ounces of clear liquids no later than 5am.  If your arrival time at the hospital is 3pm or later, you may eat a light breakfast before 8am.  A light breakfast includes: Toast or bagel (no butter)  Black coffee (no cream/milk)  Tea (no cream/milk)  Fruit juices without pulp ( i.e. apple juice)  Do NOT eat meat, eggs, vegetables or fruit  If you have any questions, please contact your surgeon's office. Patient Information Regarding COVID Restrictions    Day of Procedure    Please park in the parking deck or any designated visitor parking lot. Enter the facility through the Main Entrance of the hospital.  On the day of surgery, please provide the cell phone number of the person who will be waiting for you to the Patient Access representative at the time of registration. Masks are highly recommended in the hospital, but not required. Once your procedure and the immediate recovery period is completed, a nurse in the recovery area will contact your designated visitor to inform them of your room number or to otherwise review other pertinent information regarding your care. Social distancing practices are strongly encouraged in waiting areas and the cafeteria. The patient was contacted in person. She verbalized understanding of all instructions does not  need reinforcement.

## 2023-02-17 LAB
BACTERIA SPEC CULT: NORMAL
BACTERIA SPEC CULT: NORMAL
SERVICE CMNT-IMP: NORMAL

## 2023-02-18 LAB
ATRIAL RATE: 64 BPM
CALCULATED P AXIS, ECG09: 72 DEGREES
CALCULATED R AXIS, ECG10: 17 DEGREES
CALCULATED T AXIS, ECG11: 51 DEGREES
DIAGNOSIS, 93000: NORMAL
P-R INTERVAL, ECG05: 144 MS
Q-T INTERVAL, ECG07: 390 MS
QRS DURATION, ECG06: 92 MS
QTC CALCULATION (BEZET), ECG08: 402 MS
VENTRICULAR RATE, ECG03: 64 BPM

## 2023-03-02 ENCOUNTER — ANESTHESIA EVENT (OUTPATIENT)
Dept: SURGERY | Age: 68
End: 2023-03-02
Payer: MEDICARE

## 2023-03-02 NOTE — H&P
Juan Luis Monet is a 75 y.o. female. Pain Score: 4   Chief Complaint: Injury of the Right Knee        HISTORY OF PRESENT OF ILLNESS:  Christos Khan is a 75 y.o. female who presents for injury of the right knee. She has a history of a meniscectomy the last year of her college after injuring herself during a high school gymnastics tournament. At that time she had a complete meniscectomy done. The Pt states that her knee has been bothering more significantly her for approximately 5 years. She states that the injection provided significant relief. The Pt states that she had a fall approximately two weeks ago. She states by at that point, she had significantly decreased her hiking. She states that she went to [facility] in February 2022, but refused an injection at that time. The Pt states that she would like to discuss a TKA. She has failed conservative management with anti-inflammatories, injections, and physical therapy. She has modified her activity and is still unable to do a relatively normal amount of activity due to medial knee pain. It is now cause her to fall a couple times and is buckling and she would like to talk about more aggressive activity.      Medical History        Past Medical History:   Diagnosis Date    Depression      Osteoarthritis      Osteoporosis           Surgical History         Past Surgical History:   Procedure Laterality Date    KNEE SURGERY        NO RELEVANT SURGERIES                   Family History   Problem Relation Age of Onset    Clotting disorder Mother      Coronary artery disease Mother      Coronary artery disease Father      No Known Problems Brother      No Known Problems Sister      No Known Problems Son      No Known Problems Daughter      No Known Problems Other      Diabetes Neg Hx      Clotting disorder Neg Hx      Anesthesia problems Neg Hx        Social History            Tobacco Use    Smoking status: Never    Smokeless tobacco: Never   Substance Use Topics Alcohol use: Yes       Comment: 4-5 drinks per week    Drug use: Yes       Comment: Have been approved for a [facility] M[facility] card - insomnia      Review of Systems   1/10/2023     Constitutional: Unexplained: Negative  Genitourinary: Frequent Urination: Negative  HEENT: Vision Loss: Negative  Neurological: Memory Loss: Negative  Integumentary: Rash: Negative  Cardiovascular: Palpatations: Negative  Hematologic: Bruises/Bleeds Easily: Positive  Gastrointestinal: Constipation: Negative  Immunological: Seasonal Allergies: Positive  Musculoskeletal: Joint Pain: Positive  Objective:      Vitals       Vitals:     01/10/23 1009   Weight: 104 lb   Height: 5' 4\"            Constitutional:  No acute distress. Well nourished. Well developed. Psychiatric: Alert and oriented x3. Eyes:  Sclera are nonicteric. Respiratory:  No labored breathing. Cardiovascular:  No marked edema. Skin:  No marked skin ulcers. Neurological:  No marked sensory loss noted. Right knee exam has varus alignment with full flexion and near full extension. The deformity corrects reasonably well with valgus stress testing. There is good stability in all planes and no crepitance and no effusion. The Lachman's and drawer are negative. The knee has pseudo opening on valgus stress testing. The patella tracks well. There is medial joint line tenderness. The leg is neurovascular intact distally with no skin changes rashes erythema deformity or bruising about the leg. There is no edema and good pulses distally. Radiographs:           No imaging obtained       I have is interpreted the MRI which shows evidence of significant medial joint space narrowing with bone-on-bone contact and some wear of the medial tibia. She does have a slight shift of the femur on the tibia but no real narrowing of the lateral joint space and no significant femoral spurring although there is some slight lateral tibial spurring.   Patellofemoral exercises show some slight lateral and medial spurring which could be related to patellofemoral joint verses the medial lateral compartments. Assessment:      1. Post-traumatic osteoarthritis of right knee       There is no problem list on file for this patient. Plan:      She has severe arthritis which has at this point failed conservative management with injections, anti-inflammatories, activity modification, and rest.  She has modified activity back to a point where she is unable to really do anything significant and her pain is still present and wakes her from sleep. X-ray show severe medial compartment arthritis but not a lot of lateral or patellofemoral wear. We discussed that the Pt has significant arthritis present on her x-ray. We discussed that the pt is a better candidate for a TKA rather than a partial TKA. We will schedule the Pt for a partial TKA, performing a TKA if necessary. We will discuss scheduling in mid February. Total knee replacement was discussed with the patient today including the risks benefits and possible complications. It was discussed with them that the surgery is done on same day surgery basis. The patient will arrive the day of surgery. Surgery will be done under spinal and block with sedation. The main risks of the operation are blood loss infection and persistent pain. Surgery would last approximately an hour and a half the patient was then go to the floor with a would be expected to walk on the 1st day. Prior to discharge that would be required to walk a certain distance be able to get up on their own and ambulate stairs. Patient would be required to be on a blood thinner after surgery. This will be required for at least 4 weeks postop. Will complete recovery is expected take 3-6 months. All this was explained to the patient and they voiced complete understanding. It would be expected that the patient would require postoperative pain medicine for up to 8 weeks after surgery.  Patient desired to go forward with surgery and will be scheduled. We will evaluate her knee the time of surgery. If she indeed has evidence of significant lateral or patellofemoral wear that we would do a total knee replacement but if possible we would try to do a partial/unicondylar medial knee replacement.

## 2023-03-03 ENCOUNTER — ANESTHESIA (OUTPATIENT)
Dept: SURGERY | Age: 68
End: 2023-03-03
Payer: MEDICARE

## 2023-03-03 ENCOUNTER — TELEPHONE (OUTPATIENT)
Dept: INTERNAL MEDICINE CLINIC | Age: 68
End: 2023-03-03

## 2023-03-03 ENCOUNTER — HOSPITAL ENCOUNTER (OUTPATIENT)
Age: 68
Setting detail: OUTPATIENT SURGERY
Discharge: HOME | End: 2023-03-03
Attending: ORTHOPAEDIC SURGERY | Admitting: ORTHOPAEDIC SURGERY
Payer: MEDICARE

## 2023-03-03 ENCOUNTER — APPOINTMENT (OUTPATIENT)
Dept: GENERAL RADIOLOGY | Age: 68
End: 2023-03-03
Attending: ORTHOPAEDIC SURGERY
Payer: MEDICARE

## 2023-03-03 VITALS
TEMPERATURE: 97.4 F | OXYGEN SATURATION: 98 % | BODY MASS INDEX: 17.24 KG/M2 | SYSTOLIC BLOOD PRESSURE: 125 MMHG | WEIGHT: 100.97 LBS | HEART RATE: 72 BPM | HEIGHT: 64 IN | DIASTOLIC BLOOD PRESSURE: 70 MMHG | RESPIRATION RATE: 21 BRPM

## 2023-03-03 DIAGNOSIS — M17.11 PRIMARY OSTEOARTHRITIS OF RIGHT KNEE: Primary | ICD-10-CM

## 2023-03-03 LAB
ABO + RH BLD: NORMAL
BLOOD GROUP ANTIBODIES SERPL: NORMAL
GLUCOSE BLD STRIP.AUTO-MCNC: 102 MG/DL (ref 65–117)
SERVICE CMNT-IMP: NORMAL
SPECIMEN EXP DATE BLD: NORMAL

## 2023-03-03 PROCEDURE — 76210000021 HC REC RM PH II 0.5 TO 1 HR: Performed by: ORTHOPAEDIC SURGERY

## 2023-03-03 PROCEDURE — C1776 JOINT DEVICE (IMPLANTABLE): HCPCS | Performed by: ORTHOPAEDIC SURGERY

## 2023-03-03 PROCEDURE — 74011000250 HC RX REV CODE- 250: Performed by: ANESTHESIOLOGY

## 2023-03-03 PROCEDURE — 77030007866 HC KT SPN ANES BBMI -B: Performed by: ANESTHESIOLOGY

## 2023-03-03 PROCEDURE — 74011250636 HC RX REV CODE- 250/636: Performed by: ANESTHESIOLOGY

## 2023-03-03 PROCEDURE — 76060000035 HC ANESTHESIA 2 TO 2.5 HR: Performed by: ORTHOPAEDIC SURGERY

## 2023-03-03 PROCEDURE — 82962 GLUCOSE BLOOD TEST: CPT

## 2023-03-03 PROCEDURE — 74011250637 HC RX REV CODE- 250/637: Performed by: ANESTHESIOLOGY

## 2023-03-03 PROCEDURE — 77030031139 HC SUT VCRL2 J&J -A: Performed by: ORTHOPAEDIC SURGERY

## 2023-03-03 PROCEDURE — 77030039497 HC CST PAD STERILE CHCS -A: Performed by: ORTHOPAEDIC SURGERY

## 2023-03-03 PROCEDURE — C1713 ANCHOR/SCREW BN/BN,TIS/BN: HCPCS | Performed by: ORTHOPAEDIC SURGERY

## 2023-03-03 PROCEDURE — 77030000032 HC CUF TRNQT ZIMM -B: Performed by: ORTHOPAEDIC SURGERY

## 2023-03-03 PROCEDURE — 76210000017 HC OR PH I REC 1.5 TO 2 HR: Performed by: ORTHOPAEDIC SURGERY

## 2023-03-03 PROCEDURE — 73560 X-RAY EXAM OF KNEE 1 OR 2: CPT

## 2023-03-03 PROCEDURE — 77030035236 HC SUT PDS STRATFX BARB J&J -B: Performed by: ORTHOPAEDIC SURGERY

## 2023-03-03 PROCEDURE — 86900 BLOOD TYPING SEROLOGIC ABO: CPT

## 2023-03-03 PROCEDURE — 36415 COLL VENOUS BLD VENIPUNCTURE: CPT

## 2023-03-03 PROCEDURE — 77030040922 HC BLNKT HYPOTHRM STRY -A

## 2023-03-03 PROCEDURE — 97161 PT EVAL LOW COMPLEX 20 MIN: CPT

## 2023-03-03 PROCEDURE — 76010000171 HC OR TIME 2 TO 2.5 HR INTENSV-TIER 1: Performed by: ORTHOPAEDIC SURGERY

## 2023-03-03 PROCEDURE — 74011000250 HC RX REV CODE- 250: Performed by: ORTHOPAEDIC SURGERY

## 2023-03-03 PROCEDURE — 77030014077 HC TOWER MX CEM J&J -C: Performed by: ORTHOPAEDIC SURGERY

## 2023-03-03 PROCEDURE — 77030040361 HC SLV COMPR DVT MDII -B: Performed by: ORTHOPAEDIC SURGERY

## 2023-03-03 PROCEDURE — 77030005513 HC CATH URETH FOL11 MDII -B: Performed by: ORTHOPAEDIC SURGERY

## 2023-03-03 PROCEDURE — 77030003601 HC NDL NRV BLK BBMI -A

## 2023-03-03 PROCEDURE — 97530 THERAPEUTIC ACTIVITIES: CPT

## 2023-03-03 PROCEDURE — 74011250636 HC RX REV CODE- 250/636: Performed by: ORTHOPAEDIC SURGERY

## 2023-03-03 PROCEDURE — C9290 INJ, BUPIVACAINE LIPOSOME: HCPCS | Performed by: ORTHOPAEDIC SURGERY

## 2023-03-03 PROCEDURE — 2709999900 HC NON-CHARGEABLE SUPPLY: Performed by: ORTHOPAEDIC SURGERY

## 2023-03-03 PROCEDURE — 77030002933 HC SUT MCRYL J&J -A: Performed by: ORTHOPAEDIC SURGERY

## 2023-03-03 PROCEDURE — 97116 GAIT TRAINING THERAPY: CPT

## 2023-03-03 PROCEDURE — 77030006822 HC BLD SAW SAG BRSM -B: Performed by: ORTHOPAEDIC SURGERY

## 2023-03-03 DEVICE — IMPLANTABLE DEVICE
Type: IMPLANTABLE DEVICE | Site: KNEE | Status: FUNCTIONAL
Brand: PERSONA® VIVACIT-E®

## 2023-03-03 DEVICE — IMPLANTABLE DEVICE
Type: IMPLANTABLE DEVICE | Site: KNEE | Status: FUNCTIONAL
Brand: PERSONA® NATURAL TIBIA®

## 2023-03-03 DEVICE — IMPLANTABLE DEVICE
Type: IMPLANTABLE DEVICE | Site: KNEE | Status: FUNCTIONAL
Brand: PERSONA®

## 2023-03-03 DEVICE — KNEE K1 TOT HEMI STD CEM IMPL CAPPED K1 ZIM: Type: IMPLANTABLE DEVICE | Status: FUNCTIONAL

## 2023-03-03 DEVICE — SMARTSET HIGH PERFORMANCE MV MEDIUM VISCOSITY BONE CEMENT 40G
Type: IMPLANTABLE DEVICE | Site: KNEE | Status: FUNCTIONAL
Brand: SMARTSET

## 2023-03-03 RX ORDER — ONDANSETRON 2 MG/ML
4 INJECTION INTRAMUSCULAR; INTRAVENOUS
Status: CANCELLED | OUTPATIENT
Start: 2023-03-03 | End: 2023-03-04

## 2023-03-03 RX ORDER — MIDAZOLAM HYDROCHLORIDE 1 MG/ML
1 INJECTION, SOLUTION INTRAMUSCULAR; INTRAVENOUS AS NEEDED
Status: DISCONTINUED | OUTPATIENT
Start: 2023-03-03 | End: 2023-03-03 | Stop reason: HOSPADM

## 2023-03-03 RX ORDER — MORPHINE SULFATE 10 MG/ML
INJECTION, SOLUTION INTRAMUSCULAR; INTRAVENOUS AS NEEDED
Status: DISCONTINUED | OUTPATIENT
Start: 2023-03-03 | End: 2023-03-03 | Stop reason: HOSPADM

## 2023-03-03 RX ORDER — FENTANYL CITRATE 50 UG/ML
25 INJECTION, SOLUTION INTRAMUSCULAR; INTRAVENOUS
Status: COMPLETED | OUTPATIENT
Start: 2023-03-03 | End: 2023-03-03

## 2023-03-03 RX ORDER — OXYCODONE HYDROCHLORIDE 5 MG/1
5 TABLET ORAL AS NEEDED
Status: DISCONTINUED | OUTPATIENT
Start: 2023-03-03 | End: 2023-03-03 | Stop reason: HOSPADM

## 2023-03-03 RX ORDER — BUPIVACAINE HYDROCHLORIDE 5 MG/ML
INJECTION, SOLUTION EPIDURAL; INTRACAUDAL AS NEEDED
Status: DISCONTINUED | OUTPATIENT
Start: 2023-03-03 | End: 2023-03-03 | Stop reason: HOSPADM

## 2023-03-03 RX ORDER — DIPHENHYDRAMINE HYDROCHLORIDE 50 MG/ML
12.5 INJECTION, SOLUTION INTRAMUSCULAR; INTRAVENOUS
Status: CANCELLED | OUTPATIENT
Start: 2023-03-03 | End: 2023-03-04

## 2023-03-03 RX ORDER — SODIUM CHLORIDE 0.9 % (FLUSH) 0.9 %
5-40 SYRINGE (ML) INJECTION EVERY 8 HOURS
Status: CANCELLED | OUTPATIENT
Start: 2023-03-03

## 2023-03-03 RX ORDER — ROPIVACAINE HYDROCHLORIDE 5 MG/ML
INJECTION, SOLUTION EPIDURAL; INFILTRATION; PERINEURAL
Status: COMPLETED | OUTPATIENT
Start: 2023-03-03 | End: 2023-03-03

## 2023-03-03 RX ORDER — MORPHINE SULFATE 2 MG/ML
2 INJECTION, SOLUTION INTRAMUSCULAR; INTRAVENOUS
Status: DISCONTINUED | OUTPATIENT
Start: 2023-03-03 | End: 2023-03-03 | Stop reason: HOSPADM

## 2023-03-03 RX ORDER — NORETHINDRONE AND ETHINYL ESTRADIOL 0.5-0.035
KIT ORAL AS NEEDED
Status: DISCONTINUED | OUTPATIENT
Start: 2023-03-03 | End: 2023-03-03 | Stop reason: HOSPADM

## 2023-03-03 RX ORDER — METHYLPREDNISOLONE ACETATE 40 MG/ML
INJECTION, SUSPENSION INTRA-ARTICULAR; INTRALESIONAL; INTRAMUSCULAR; SOFT TISSUE AS NEEDED
Status: DISCONTINUED | OUTPATIENT
Start: 2023-03-03 | End: 2023-03-03 | Stop reason: HOSPADM

## 2023-03-03 RX ORDER — CEFAZOLIN SODIUM 1 G/3ML
INJECTION, POWDER, FOR SOLUTION INTRAMUSCULAR; INTRAVENOUS AS NEEDED
Status: DISCONTINUED | OUTPATIENT
Start: 2023-03-03 | End: 2023-03-03 | Stop reason: HOSPADM

## 2023-03-03 RX ORDER — SODIUM CHLORIDE 0.9 % (FLUSH) 0.9 %
5-40 SYRINGE (ML) INJECTION AS NEEDED
Status: DISCONTINUED | OUTPATIENT
Start: 2023-03-03 | End: 2023-03-03 | Stop reason: HOSPADM

## 2023-03-03 RX ORDER — NALOXONE HYDROCHLORIDE 0.4 MG/ML
0.4 INJECTION, SOLUTION INTRAMUSCULAR; INTRAVENOUS; SUBCUTANEOUS AS NEEDED
Status: CANCELLED | OUTPATIENT
Start: 2023-03-03

## 2023-03-03 RX ORDER — GUAIFENESIN 100 MG/5ML
81 LIQUID (ML) ORAL 2 TIMES DAILY
Qty: 56 TABLET | Refills: 0 | Status: SHIPPED
Start: 2023-03-03 | End: 2023-03-31

## 2023-03-03 RX ORDER — SODIUM CHLORIDE 0.9 % (FLUSH) 0.9 %
5-40 SYRINGE (ML) INJECTION EVERY 8 HOURS
Status: DISCONTINUED | OUTPATIENT
Start: 2023-03-03 | End: 2023-03-03 | Stop reason: HOSPADM

## 2023-03-03 RX ORDER — ACETAMINOPHEN 325 MG/1
650 TABLET ORAL ONCE
Status: COMPLETED | OUTPATIENT
Start: 2023-03-03 | End: 2023-03-03

## 2023-03-03 RX ORDER — ONDANSETRON 2 MG/ML
4 INJECTION INTRAMUSCULAR; INTRAVENOUS AS NEEDED
Status: DISCONTINUED | OUTPATIENT
Start: 2023-03-03 | End: 2023-03-03 | Stop reason: HOSPADM

## 2023-03-03 RX ORDER — HYDROMORPHONE HYDROCHLORIDE 1 MG/ML
0.5 INJECTION, SOLUTION INTRAMUSCULAR; INTRAVENOUS; SUBCUTANEOUS
Status: CANCELLED | OUTPATIENT
Start: 2023-03-03 | End: 2023-03-04

## 2023-03-03 RX ORDER — FENTANYL CITRATE 50 UG/ML
50 INJECTION, SOLUTION INTRAMUSCULAR; INTRAVENOUS AS NEEDED
Status: DISCONTINUED | OUTPATIENT
Start: 2023-03-03 | End: 2023-03-03 | Stop reason: HOSPADM

## 2023-03-03 RX ORDER — ACETAMINOPHEN 500 MG
1000 TABLET ORAL EVERY 6 HOURS
Status: CANCELLED | OUTPATIENT
Start: 2023-03-03

## 2023-03-03 RX ORDER — OXYCODONE HYDROCHLORIDE 5 MG/1
5 TABLET ORAL
Qty: 41 TABLET | Refills: 0 | Status: SHIPPED | OUTPATIENT
Start: 2023-03-03 | End: 2023-03-10

## 2023-03-03 RX ORDER — TRANEXAMIC ACID 100 MG/ML
INJECTION, SOLUTION INTRAVENOUS AS NEEDED
Status: DISCONTINUED | OUTPATIENT
Start: 2023-03-03 | End: 2023-03-03 | Stop reason: HOSPADM

## 2023-03-03 RX ORDER — ONDANSETRON 2 MG/ML
INJECTION INTRAMUSCULAR; INTRAVENOUS AS NEEDED
Status: DISCONTINUED | OUTPATIENT
Start: 2023-03-03 | End: 2023-03-03 | Stop reason: HOSPADM

## 2023-03-03 RX ORDER — DEXAMETHASONE SODIUM PHOSPHATE 4 MG/ML
INJECTION, SOLUTION INTRA-ARTICULAR; INTRALESIONAL; INTRAMUSCULAR; INTRAVENOUS; SOFT TISSUE AS NEEDED
Status: DISCONTINUED | OUTPATIENT
Start: 2023-03-03 | End: 2023-03-03 | Stop reason: HOSPADM

## 2023-03-03 RX ORDER — SODIUM CHLORIDE 9 MG/ML
125 INJECTION, SOLUTION INTRAVENOUS CONTINUOUS
Status: DISCONTINUED | OUTPATIENT
Start: 2023-03-03 | End: 2023-03-03 | Stop reason: HOSPADM

## 2023-03-03 RX ORDER — SODIUM CHLORIDE 0.9 % (FLUSH) 0.9 %
5-40 SYRINGE (ML) INJECTION AS NEEDED
Status: CANCELLED | OUTPATIENT
Start: 2023-03-03

## 2023-03-03 RX ORDER — LIDOCAINE HYDROCHLORIDE 10 MG/ML
0.5 INJECTION, SOLUTION EPIDURAL; INFILTRATION; INTRACAUDAL; PERINEURAL AS NEEDED
Status: DISCONTINUED | OUTPATIENT
Start: 2023-03-03 | End: 2023-03-03 | Stop reason: HOSPADM

## 2023-03-03 RX ORDER — PROPOFOL 10 MG/ML
INJECTION, EMULSION INTRAVENOUS
Status: DISCONTINUED | OUTPATIENT
Start: 2023-03-03 | End: 2023-03-03 | Stop reason: HOSPADM

## 2023-03-03 RX ORDER — SODIUM CHLORIDE, SODIUM LACTATE, POTASSIUM CHLORIDE, CALCIUM CHLORIDE 600; 310; 30; 20 MG/100ML; MG/100ML; MG/100ML; MG/100ML
INJECTION, SOLUTION INTRAVENOUS
Status: DISCONTINUED | OUTPATIENT
Start: 2023-03-03 | End: 2023-03-03 | Stop reason: HOSPADM

## 2023-03-03 RX ORDER — CELECOXIB 200 MG/1
200 CAPSULE ORAL 2 TIMES DAILY
Status: CANCELLED | OUTPATIENT
Start: 2023-03-03

## 2023-03-03 RX ORDER — DEXTROSE, SODIUM CHLORIDE, SODIUM LACTATE, POTASSIUM CHLORIDE, AND CALCIUM CHLORIDE 5; .6; .31; .03; .02 G/100ML; G/100ML; G/100ML; G/100ML; G/100ML
125 INJECTION, SOLUTION INTRAVENOUS CONTINUOUS
Status: DISCONTINUED | OUTPATIENT
Start: 2023-03-03 | End: 2023-03-03 | Stop reason: HOSPADM

## 2023-03-03 RX ORDER — OXYCODONE HYDROCHLORIDE 5 MG/1
5 TABLET ORAL
Status: DISCONTINUED | OUTPATIENT
Start: 2023-03-03 | End: 2023-03-03 | Stop reason: HOSPADM

## 2023-03-03 RX ORDER — ROPIVACAINE HYDROCHLORIDE 5 MG/ML
30 INJECTION, SOLUTION EPIDURAL; INFILTRATION; PERINEURAL AS NEEDED
Status: DISCONTINUED | OUTPATIENT
Start: 2023-03-03 | End: 2023-03-03 | Stop reason: HOSPADM

## 2023-03-03 RX ORDER — DIPHENHYDRAMINE HYDROCHLORIDE 50 MG/ML
12.5 INJECTION, SOLUTION INTRAMUSCULAR; INTRAVENOUS AS NEEDED
Status: DISCONTINUED | OUTPATIENT
Start: 2023-03-03 | End: 2023-03-03 | Stop reason: HOSPADM

## 2023-03-03 RX ORDER — OXYCODONE HYDROCHLORIDE 5 MG/1
10 TABLET ORAL
Status: DISCONTINUED | OUTPATIENT
Start: 2023-03-03 | End: 2023-03-03 | Stop reason: HOSPADM

## 2023-03-03 RX ORDER — MIDAZOLAM HYDROCHLORIDE 1 MG/ML
1 INJECTION, SOLUTION INTRAMUSCULAR; INTRAVENOUS
Status: DISCONTINUED | OUTPATIENT
Start: 2023-03-03 | End: 2023-03-03 | Stop reason: HOSPADM

## 2023-03-03 RX ORDER — SODIUM CHLORIDE, SODIUM LACTATE, POTASSIUM CHLORIDE, CALCIUM CHLORIDE 600; 310; 30; 20 MG/100ML; MG/100ML; MG/100ML; MG/100ML
125 INJECTION, SOLUTION INTRAVENOUS CONTINUOUS
Status: DISCONTINUED | OUTPATIENT
Start: 2023-03-03 | End: 2023-03-03 | Stop reason: HOSPADM

## 2023-03-03 RX ORDER — FACIAL-BODY WIPES
10 EACH TOPICAL DAILY PRN
Status: CANCELLED | OUTPATIENT
Start: 2023-03-05

## 2023-03-03 RX ORDER — POLYETHYLENE GLYCOL 3350 17 G/17G
17 POWDER, FOR SOLUTION ORAL DAILY
Status: CANCELLED | OUTPATIENT
Start: 2023-03-03

## 2023-03-03 RX ADMIN — FENTANYL CITRATE 25 MCG: 50 INJECTION, SOLUTION INTRAMUSCULAR; INTRAVENOUS at 11:02

## 2023-03-03 RX ADMIN — TRANEXAMIC ACID 1 G: 100 INJECTION, SOLUTION INTRAVENOUS at 07:57

## 2023-03-03 RX ADMIN — PHENYLEPHRINE HYDROCHLORIDE 80 MCG/MIN: 10 INJECTION INTRAVENOUS at 07:35

## 2023-03-03 RX ADMIN — CEFAZOLIN 2 G: 330 INJECTION, POWDER, FOR SOLUTION INTRAMUSCULAR; INTRAVENOUS at 07:50

## 2023-03-03 RX ADMIN — OXYCODONE 5 MG: 5 TABLET ORAL at 11:21

## 2023-03-03 RX ADMIN — EPHEDRINE SULFATE 20 MG: 50 INJECTION INTRAVENOUS at 09:30

## 2023-03-03 RX ADMIN — FENTANYL CITRATE 25 MCG: 50 INJECTION, SOLUTION INTRAMUSCULAR; INTRAVENOUS at 11:07

## 2023-03-03 RX ADMIN — PROPOFOL 75 MCG/KG/MIN: 10 INJECTION, EMULSION INTRAVENOUS at 07:31

## 2023-03-03 RX ADMIN — ACETAMINOPHEN 650 MG: 325 TABLET ORAL at 06:55

## 2023-03-03 RX ADMIN — MEPIVACAINE HYDROCHLORIDE 55 MG: 15 INJECTION, SOLUTION EPIDURAL; INFILTRATION at 07:36

## 2023-03-03 RX ADMIN — SODIUM CHLORIDE, POTASSIUM CHLORIDE, SODIUM LACTATE AND CALCIUM CHLORIDE: 600; 310; 30; 20 INJECTION, SOLUTION INTRAVENOUS at 07:15

## 2023-03-03 RX ADMIN — DEXAMETHASONE SODIUM PHOSPHATE 4 MG: 4 INJECTION, SOLUTION INTRAMUSCULAR; INTRAVENOUS at 09:29

## 2023-03-03 RX ADMIN — SODIUM CHLORIDE, POTASSIUM CHLORIDE, SODIUM LACTATE AND CALCIUM CHLORIDE 125 ML/HR: 600; 310; 30; 20 INJECTION, SOLUTION INTRAVENOUS at 06:45

## 2023-03-03 RX ADMIN — FENTANYL CITRATE 25 MCG: 50 INJECTION, SOLUTION INTRAMUSCULAR; INTRAVENOUS at 11:17

## 2023-03-03 RX ADMIN — NORETHINDRONE AND ETHINYL ESTRADIOL 30 MG: KIT ORAL at 09:39

## 2023-03-03 RX ADMIN — FENTANYL CITRATE 25 MCG: 50 INJECTION, SOLUTION INTRAMUSCULAR; INTRAVENOUS at 11:12

## 2023-03-03 RX ADMIN — ROPIVACAINE HYDROCHLORIDE 25 ML: 5 INJECTION, SOLUTION EPIDURAL; INFILTRATION; PERINEURAL at 07:23

## 2023-03-03 RX ADMIN — MIDAZOLAM 2 MG: 1 INJECTION INTRAMUSCULAR; INTRAVENOUS at 07:20

## 2023-03-03 RX ADMIN — ONDANSETRON HYDROCHLORIDE 4 MG: 2 INJECTION, SOLUTION INTRAMUSCULAR; INTRAVENOUS at 09:28

## 2023-03-03 RX ADMIN — FENTANYL CITRATE 100 MCG: 50 INJECTION, SOLUTION INTRAMUSCULAR; INTRAVENOUS at 07:20

## 2023-03-03 NOTE — ANESTHESIA PROCEDURE NOTES
Spinal Block    Start time: 3/3/2023 7:31 AM  End time: 3/3/2023 7:36 AM  Performed by: Reynaldo Lange MD  Authorized by: Reynaldo Lange MD     Pre-procedure:   Indications: primary anesthetic  Preanesthetic Checklist: patient identified, risks and benefits discussed, anesthesia consent, site marked, patient being monitored and timeout performed      Spinal Block:   Patient Position:  Seated  Prep Region:  Lumbar  Prep: Betadine and patient draped      Location:  L3-4  Technique:  Single shot  Local: mepivacaine-pf (CARBOCAINE-PF) 1.5% PF injection - Other   55 mg - 3/3/2023 7:36:00 AM    Med Admin Time: 3/3/2023 7:36 AM    Needle:   Needle Type:  Pencan  Needle Gauge:  24 G  Attempts:  1      Events: CSF confirmed, no blood with aspiration and no paresthesia        Assessment:  Insertion:  Uncomplicated  Patient tolerance:  Patient tolerated the procedure well with no immediate complications

## 2023-03-03 NOTE — TELEPHONE ENCOUNTER
----- Message from Efrain Boone sent at 3/3/2023 11:31 AM EST -----  Subject: Message to Provider    QUESTIONS  Information for Provider? AT [address] [place] [address] is in need of the last office note. Jonelle Dash [phone]  ---------------------------------------------------------------------------  --------------  Catina Arradiance  [phone]; OK to leave message on voicemail  ---------------------------------------------------------------------------  --------------  SCRIPT ANSWERS  Relationship to Patient? Third Party  Third Party Type? Home Health Care? Representative Name?  [facility]

## 2023-03-03 NOTE — PROGRESS NOTES
PHYSICAL THERAPY EVALUATION/DISCHARGE  Patient: Marcelino Daniel (89 y.o. female)  Date: 3/3/2023  Primary Diagnosis: DJD RIGHT KNEE  Procedure(s) (LRB):  RIGHT PARTIAL KNEE REPLACEMENT  VERSUS TOTAL KNEE REPLACEMENT (FAST TRACK) (ANES SPINAL/BLOCK) (REQ FLIP) (Right) Day of Surgery   Precautions:   Fall, WBAT      ASSESSMENT  Based on the objective data described below, the patient presents with  impairment in functional mobility, activity tolerance and balance s/p R  TKA, in Fast Track PACU. PLOF: Independent with ADLs and IADLs. Patient lives alone in a tri-level home with 3 steps and no rails to enter. Has 6 steps with rail on left to bedroom and bathroom. Patient performed bed mobility/transfers/gait/stair negotiation/post TKA exercises and participated in discharge teaching. Patient is cleared for discharge from PT standpoint. Patient is independent with post-op TKA exercise protocol and has same in written, illlustrated form. PT Discharge instructions reviewed. Patient and family friend demonstrated understanding. Functional Outcome Measure: The patient scored 90/100 on the Barthel outcome measure which is indicative of minimal impaired ability to care for basic self-needs/dependency on others. .    Other factors to consider for discharge: Motivated/A & O x 4/Supportive Family/Independent PLOF      Further skilled acute physical therapy is not indicated at this time. PLAN :  Recommendation for discharge: (in order for the patient to meet his/her long term goals)  Physical therapy at least 2 days/week in the home     This discharge recommendation:  Has been made in collaboration with the attending provider and/or case management    IF patient discharges home will need the following DME: patient owns DME required for discharge       SUBJECTIVE:   Patient stated I am ready to go home today.     OBJECTIVE DATA SUMMARY:   HISTORY:    Past Medical History:   Diagnosis Date    Arthritis     Left great toe, R knee, L thumb    Celiac syndrome     by blood testing    Iron deficiency     Restless legs     Seasonal affective disorder [facility])      Past Surgical History:   Procedure Laterality Date    ENDOSCOPY, COLON, DIAGNOSTIC  02/13/2009    benign sessile polyp in 2013 - repeat in 5 years    [address]    HX MENISCECTOMY Right 1975    HX PELVIC LAPAROSCOPY  1988    BLOCKED FALLOPIAN TUBE    NC OTOPLASTY PROTRUDING EAR W/WO SIZE [place] Bilateral 2019       Prior level of function: PLOF: Independent with ADLs and IADLs. Personal factors and/or comorbidities impacting plan of care: Motivated/A & O x 4/Supportive Family/Independent PLOF     Home Situation  Home Environment: Private residence  # Steps to Enter: 3  Rails to Enter: No  Hand Rails :  (None)  Wheelchair Ramp: No  One/Two Story Residence:  (Tri-Level[de-identified] 6 with rail to second floor bed & bath)  Living Alone: Yes  Support Systems: Friend/Neighbor  Patient Expects to be Discharged to[de-identified] Home with home health  Current DME Used/Available at Home: Precilla Blower, straight, Walker, rolling, Transfer bench  Tub or Shower Type: Shower (and tub-shower)    EXAMINATION/PRESENTATION/DECISION MAKING:   Critical Behavior:  Neurologic State: Alert           Hearing:     Skin:  Ace Wrap Intact with no drainage appreciated.    Edema: Normal post-op inflammation   Range Of Motion:  AROM: Generally decreased, functional           PROM: Generally decreased, functional           Strength:    Strength: Generally decreased, functional                    Tone & Sensation:   Tone: Normal              Sensation: Intact               Coordination:  Coordination: Within functional limits  Vision:      Functional Mobility:  Bed Mobility:  Rolling: Modified independent  Supine to Sit: Modified independent  Sit to Supine: Modified independent  Scooting: Modified independent  Transfers:  Sit to Stand: Modified independent  Stand to Sit: Modified independent        Bed to Chair: Modified independent              Balance:   Sitting: Intact  Standing: Impaired; Without support  Standing - Static: Good;Constant support  Standing - Dynamic : Fair;Constant support  Ambulation/Gait Training:  Distance (ft): 50 Feet (ft)  Assistive Device: Walker, rolling;Gait belt  Ambulation - Level of Assistance: Supervision        Gait Abnormalities: Antalgic  Right Side Weight Bearing: As tolerated     Base of Support: Shift to left  Stance: Right decreased  Speed/Shannon: Slow  Step Length: Left shortened  Swing Pattern: Right asymmetrical     Interventions: Safety awareness training;Verbal cues            Stairs:  Number of Stairs Trained: 8 (2 sets of 4 (4 with left rail and cane/4 with left cane and PT on Right)  Stairs - Level of Assistance: Contact guard assistance   Rail Use: Left  (Left rail and cane x 4 steps, Left Rail and PT on right for 4 steps)    Therapeutic Exercises:   Patient  is independent with post-op TKA exercise protocol and has same in written, illlustrated form. Functional Measure:  Barthel Index:    Bathin  Bladder: 10  Bowels: 10  Groomin  Dressin  Feeding: 10  Mobility: 15  Stairs: 10  Toilet Use: 10  Transfer (Bed to Chair and Back): 15  Total: 90/100       The Barthel ADL Index: Guidelines  1. The index should be used as a record of what a patient does, not as a record of what a patient could do. 2. The main aim is to establish degree of independence from any help, physical or verbal, however minor and for whatever reason. 3. The need for supervision renders the patient not independent. 4. A patient's performance should be established using the best available evidence. Asking the patient, friends/relatives and nurses are the usual sources, but direct observation and common sense are also important. However direct testing is not needed.   5. Usually the patient's performance over the preceding 24-48 hours is important, but occasionally longer periods will be relevant. 6. Middle categories imply that the patient supplies over 50 per cent of the effort. 7. Use of aids to be independent is allowed. Score Interpretation (from [address])    Independent   60-79 Minimally independent   40-59 Partially dependent   20-39 Very dependent   <20 Totally dependent     -Santino Collier., Barthel, D.W. (1965). Functional evaluation: the Barthel Index. [address] ([address] (). The Barthel activities of daily living index: self-reporting versus actual performance in the old (> or = 75 years). Journal of [address] [place] [address] 45(7), [address] [facility], HENRI, Lashell Bennett., Gene Monday. (). Measuring the change in disability after inpatient rehabilitation; comparison of the responsiveness of the Barthel Index and Functional [place] Measure. Journal of Neurology, Neurosurgery, and Psychiatry, 66(4), 335-836. Nel Penaloza, N.J.A, ARPITA [place], & Cheo Silverio MMelloA. (2004) Assessment of post-stroke quality of life in cost-effectiveness studies: The usefulness of the Barthel Index and the EuroQoL-5D.  Quality of Life Research, 15, 168-71           Physical Therapy Evaluation Charge Determination   History Examination Presentation Decision-Making   LOW Complexity : Zero comorbidities / personal factors that will impact the outcome / POC LOW Complexity : 1-2 Standardized tests and measures addressing body structure, function, activity limitation and / or participation in recreation  LOW Complexity : Stable, uncomplicated  LOW Complexity : FOTO score of       Based on the above components, the patient evaluation is determined to be of the following complexity level: LOW     Pain Ratin/10    Activity Tolerance:   Good      After treatment patient left in no apparent distress:   Sitting in chair, Call bell within reach, Caregiver / family present, and nurse present    COMMUNICATION/EDUCATION:   The patients plan of care was discussed with: Occupational therapist and Registered nurse. Fall prevention education was provided and the patient/caregiver indicated understanding., Patient/family have participated as able in goal setting and plan of care. , and Patient/family agree to work toward stated goals and plan of care.     Thank you for this referral.  Berta Wang   Time Calculation: 30 mins

## 2023-03-03 NOTE — ANESTHESIA PROCEDURE NOTES
Peripheral Block    Start time: 3/3/2023 7:17 AM  End time: 3/3/2023 7:23 AM  Performed by: Isaiah Hines MD  Authorized by: Isaiah Hines MD       Pre-procedure: Indications: at surgeon's request and post-op pain management    Preanesthetic Checklist: patient identified, risks and benefits discussed, site marked, timeout performed, anesthesia consent given and patient being monitored    Timeout Time: 07:17 EST      Block Type:   Block Type:   Adductor canal  Laterality:  Right  Monitoring:  Standard ASA monitoring, continuous pulse ox, frequent vital sign checks, heart rate, oxygen and responsive to questions  Injection Technique:  Single shot  Procedures: ultrasound guided    Patient Position: supine  Prep: chlorhexidine    Location:  Mid thigh  Needle Type:  Stimuplex  Needle Gauge:  22 G  Needle Localization:  Ultrasound guidance  Medication Injected:  Ropivacaine (PF) (NAROPIN)(0.5%) 5 mg/mL injection - Peripheral Nerve Block   25 mL - 3/3/2023 7:23:00 AM  Med Admin Time: 3/3/2023 7:23 AM    Assessment:  Number of attempts:  1  Injection Assessment:  Incremental injection every 5 mL, negative aspiration for CSF, no paresthesia, no intravascular symptoms, negative aspiration for blood and local visualized surrounding nerve on ultrasound  Patient tolerance:  Patient tolerated the procedure well with no immediate complications

## 2023-03-03 NOTE — PERIOP NOTES
As per Dr Claudene High Pt. Can go home as soon as she is ready, does not have to wait for antibiotic to be given.

## 2023-03-03 NOTE — PROGRESS NOTES
Transition of Care: Plan for discharge home with HH/fast track patient. AT [address] has accepted patient. Patient owns rolling walker. Family will transport patient home    The Plan for Transition of Care is related to the following treatment goals: home health, prefers AT [address]    The Patient and/or patient representative  was provided with a choice of provider and agrees   with the discharge plan. [x] Yes [] No    Freedom of choice list was provided with basic dialogue that supports the patient's individualized plan of care/goals, treatment preferences and shares the quality data associated with the providers.  [x] Yes [] No      JAMSHID Johnson/CRM

## 2023-03-03 NOTE — ANESTHESIA PREPROCEDURE EVALUATION
Relevant Problems   NEUROLOGY   (+) Depression with anxiety   (+) Mild depression       Anesthetic History   No history of anesthetic complications            Review of Systems / Medical History  Patient summary reviewed, nursing notes reviewed and pertinent labs reviewed    Pulmonary  Within defined limits                 Neuro/Psych   Within defined limits      Psychiatric history     Cardiovascular  Within defined limits                Exercise tolerance: >4 METS     GI/Hepatic/Renal  Within defined limits              Endo/Other        Arthritis     Other Findings            Physical Exam    Airway  Mallampati: II  TM Distance: > 6 cm  Neck ROM: normal range of motion   Mouth opening: Normal     Cardiovascular  Regular rate and rhythm,  S1 and S2 normal,  no murmur, click, rub, or gallop             Dental  No notable dental hx       Pulmonary  Breath sounds clear to auscultation               Abdominal  GI exam deferred       Other Findings            Anesthetic Plan    ASA: 2  Anesthesia type: spinal      Post-op pain plan if not by surgeon: peripheral nerve block single    Induction: Intravenous  Anesthetic plan and risks discussed with: Patient

## 2023-03-03 NOTE — DISCHARGE INSTRUCTIONS
After Hospital Care Plan:  Discharge Instructions Knee Replacement-Dr. Alice Cabrera      Patient Name: Umu Aden  Date of procedure: 3/3/2023   Procedure: Procedure(s):  RIGHT PARTIAL KNEE REPLACEMENT  VERSUS TOTAL KNEE REPLACEMENT (FAST TRACK) (ANES SPINAL/BLOCK) (REQ FLIP)  Surgeon: Ruth Coombs) and Role:     Colby Smith MD (Jody) - Primary    Date of discharge: 3/03/2023    Diet  Regular diet or usual diet as at home. Drink plenty of fluids. Eat foods high in fiber and protein and low in fat. Avoid alcoholic beverages. Avoid smoking. Activities  You are going home a well person, so be as active as possible. Walk with your walker or crutches weight bearing as tolerated-wean as tolerated. Avoid low chairs and slippery surfaces. Avoid twisting your knee. Do not sit longer than 45 minutes at a time. During the daytime, get up every hour and take a brief walk. Exercises  Perform your exercise routine 3 times a day as instructed by the physical therapist.  Gradually increase the repetitions of the exercises. You may place an ice bag on your knee for 15-20 minutes after exercising. You should walk daily, each time lengthening your walking distance as your strength improves. Be sure to work on getting your knee out all the way straight. Do not place a pillow under your knee when resting. Medications  Take  Aspirin 81mg 1 tablet twice a day for 4 weeks. Take a multivitamin with iron once a day for a month. Take a stool softener daily (such as Senokot-S or Colace) to prevent constipation while you are taking iron and pain medication. If constipation occurs, take a laxative (such as Dulcolax tablets, Milk of Magnesia, or suppository). Take pain medication with food. You will find that you will decrease the amount you use as your pain lessens. You have been given Oxycodone 5mg tablets, Take 1-2 tablets every 4-6 hours as needed for pain.   It is ok to take Tylenol and Ibuprofen in addition to your pain meds  Please try to transition off of your Narcotic pain meds as soon as you are comfortable. Incision Care  You will have a waterproof dressing on the knee called Aquacel and it is OK to shower with the dressing in place. The Aquacel dressing will be removed 1 week after surgery. Ok to leave open to the air as long as it is not draining      If you have staples ; they will be removed 2 weeks after surgery by  your home health nurse  If there are steri-strips ; please leave them in place until they fall off. Cover your wound if you are having any drainage. Wear your elastic stockings for 4 weeks. You may remove them for approximately 1 hour when showering       Follow-up Office Visit  Post op appointment is on 3/16/2023. Please call the office if you need to change your follow -up appointment [phone] during business hours or [phone] after hours and on weekends. Reasons to call the Doctor  1. Increased redness, swelling or drainage from you incision site. 2.  Temperature consistently greater than 101 degrees. 3.  Increased pain or unrelieved pain. 4.  Calf or chest pain      Home Health/Home therapy  Physical Therapy-routine exercises, ROM,  gait training quad strengthening. 3 times in 10 day period   Remove Aquacel Dressing 1 week post op. (leave steri-strips in place iuntil they come off. Out Patient Physical therapy  You should expect to begin outpatient physical therapy on 3/13/2023 @ [facility] . This should already be set up prior to your surgery. If it is not already scheduled, please call the office and speak with one of my assistants about setting up outpatient physical therapy. Other instructions  Do not take more than 4 Grams of Tylenol in 24 hours. Many pain medications contain Tylenol. Percocet contains 325mg of Tylenol per tablet; Lortab contains 500mg of Tylenol per tablet, Norco contains 325 mg of Tylenol per tablet.     Tylenol usage:  325 mg tablets DO NOT take more than 12 tablets in 24 hours                            500mg tablets DO NOT take more than 8 tablets in 24 hours     ______________________________________________________________________    Anesthesia Discharge Instructions    After general anesthesia or intervenous sedation, for 24 hours or while taking prescription Narcotics:  Limit your activities  Do not drive or operate hazardous machinery  If you have not urinated within 8 hours after discharge, please contact your surgeon on call. Do not make important personal or business decisions  Do not drink alcoholic beverages    Report the following to your surgeon:  Excessive pain, swelling, redness or odor of or around the surgical area  Temperature over 100.5 degrees  Nausea and vomiting lasting longer than 4 hours or if unable to take medication  Any signs of decreased circulation or nerve impairment to extremity:  Change in color, persistent numbness, tingling, coldness or increased pain.   Any questions

## 2023-03-03 NOTE — ANESTHESIA POSTPROCEDURE EVALUATION
Post-Anesthesia Evaluation and Assessment    Patient: Shen Evangelista MRN: [id number]  SSN: [id number]    YOB: 1955  Age: 75 y.o. Sex: female      I have evaluated the patient and they are stable and ready for discharge from the PACU. Cardiovascular Function/Vital Signs  Visit Vitals  /70   Pulse 72   Temp 36.3 °C (97.4 °F)   Resp 21   Ht 5' 4\" (1.626 m)   Wt 45.8 kg (100 lb 15.5 oz)   SpO2 98%   BMI 17.33 kg/m²       Patient is status post General anesthesia for Procedure(s):  RIGHT PARTIAL KNEE REPLACEMENT  VERSUS TOTAL KNEE REPLACEMENT (FAST TRACK) (ANES SPINAL/BLOCK) (REQ FLIP). Nausea/Vomiting: None    Postoperative hydration reviewed and adequate. Pain:  Pain Scale 1: Numeric (0 - 10) (03/03/23 1120)  Pain Intensity 1: 5 (03/03/23 1120)   Managed    Neurological Status:   Neuro (WDL): Exceptions to WDL (03/03/23 1030)  Neuro  Neurologic State: Alert (03/03/23 1030)  LUE Motor Response: Purposeful (03/03/23 1030)  LLE Motor Response: Purposeful;Numbness (03/03/23 1030)  RUE Motor Response: Purposeful (03/03/23 1030)  RLE Motor Response: Purposeful;Numbness (03/03/23 1030)   At baseline    Mental Status, Level of Consciousness: Alert and  oriented to person, place, and time    Pulmonary Status:   O2 Device: None (Room air) (03/03/23 1030)   Adequate oxygenation and airway patent    Complications related to anesthesia: None    Post-anesthesia assessment completed. No concerns    Signed By: Marcos Cota MD     March 3, 2023              Procedure(s):  RIGHT PARTIAL KNEE REPLACEMENT  VERSUS TOTAL KNEE REPLACEMENT (FAST TRACK) (ANES SPINAL/BLOCK) (REQ FLIP). spinal    <BSHSIANPOST>    INITIAL Post-op Vital signs:   Vitals Value Taken Time   /70 03/03/23 1115   Temp 36.3 °C (97.4 °F) 03/03/23 0937   Pulse 82 03/03/23 1127   Resp 18 03/03/23 1127   SpO2 98 % 03/03/23 1127   Vitals shown include unvalidated device data.

## 2023-03-03 NOTE — OP NOTES
Total Knee Operative Report      Patient: Juan Luis Monet MRN: [id number]  SSN: [id number]    YOB: 1955  Age: 75 y.o. Sex: female      DATE OF SURGERY:  3/3/2023    Indications: This is a 75 yrs female who presents with  right knee DJD. The patient was admitted for surgery as conservative measures have failed. Date of Surgery: 3/3/2023     Preoperative Diagnosis:  DJD RIGHT KNEE    Postoperative Diagnosis: DJD RIGHT KNEE    Surgeon: Cobly Alexis MD (Jody)    Anesthesia: General    Procedure:  RIGHT TOTAL KNEE REPLACEMENT     Procedure Details:  After the procedure had been explained to the patient including the risks, benefits and possible complications, Juan Luis Monet signed the informed consent. Juan Luis Monet was then brought to the operating room and positioned on the operating table in a supine position and was anethestized with anesthesia. A long catheter was placed preoperatively and IV Ancef 2gm  was administered. A pneumatic tourniquet was placed about the limb and the right leg was prepped and draped in the usual sterile manner. The tourniquet was inflated. An anterior longitudinal incision was accomplished just medial to the tibial tubercle and extending approximal 6 centimeters proximal to the superior pole of the patella. A medial parapatellar capsular incision was performed. The medial capsular flap was elevated around to the insertion of the semimembranous tendon. The patella was everted and the knee flexed and externally rotated. The medial and lateral menisci were excised. The lateral half of the fat pad excised and the patella femoral ligament was released. The anterior cruciate ligament was resected and the posterior cruciate ligament was substituted. The Knee was flexed to 90 degrees and an intramedullary canal entry hole was drilled just anterior to the PCL insertion on the femur.  The intramedullary don was inserted and the cutting guide used this to reference for a resection of 11mm off of the distal femur in approx 6 degrees of valgus. Using extramedullary instrumentation, the tibial cut was then accomplished with three degrees of posterior slope. Approxiamately 2 mm of bone was removed from the medial side of the tibia. The flexion and extension gaps were assessed. The sizing guide for the femoral component was then placed and a size 6 was chosen. The guide was set in  3 degrees external rotation to the epicondylar axis to create an equal flexion gap. The appropriate cutting blocks were then utilized to perform the anterior,  Posterior, and  Chamfer cuts  with appropriate lateral translation accomplished. The tibia was sized to a D component. The tibial base plate was pinned into place and stem site prepared. The femoral trial with the box guide was set for the posterior cruciate substituting prosthesis and these cuts were made also. Lug holes were drilled to accommodate the femoral component. A preliminary range of motion was accomplished with the above size trial components. A 11 millimeter polyethylene insert allowed the patient to obtain full extension as well as appropriate flexion. The patient's ligaments were stable in flexion and extension to medial and lateral stressing and the alignment was through the appropriate mechanical axis. The patella was then measured using the calipers and found to be 23mm thick. Using the cutting guide, 9 mm were then resected to accommodate the size 32 patella three peg button. The appropriate guide was then used to drill the three pegs. All trial components were removed and the cut surfaces prepared for cementing with irrigation and debridement of the bone interstices. There were no femoral deficiencies. There were no tibial deficiencies. No augmentation was utilized. Two package of cement were mixed and the permanent components cemented into place.   The femoral and tibial components were pressurized in full extension as well as 70 degrees of flexion. The patella component was pressurized using the patella clamp. Excess cement was using a curette. A combination of Exparel, MSO4, Marcaine, Steroid and Saline was then injected into the entirety of the capsule to assist with post-op pain relief. Once the cement was hardened, the patella clamp was removed and the knee was copiously irrigated. Retrialing was done and a size 6R MC CD tibial polyethylene component  of 11mm thickness was chosen. Tomi Rodríguez knee was placed through range of motion and noted to be stable as mentioned above with the trail components. The wound was dry, therefore no drain was used. The capsular layer was closed using a #1 ethibond suture and stratafix suture, while subcutaneous layers were closed using 2-0 Dexon interrupted sutures. Finally the skin was closed using Skin staples, which were applied in occlusive fashion and sterile bandage applied. An Iceman cryo pad was applied on the operative leg. The pneumatic tourniquet was deflated. Sponge count and needle counts were correct. Tomi Rodríguez left the operating room and went to the PACU in Stable Condition. Gloria Ca PA-C was of vital assistance during the performance of the procedure. She was essential for positioning the knee for the various parts of the procedure and assisting with the exposure of the knee, protection of vital structures and the closure of the knee. Tourniquet Time:   Total Tourniquet Time Documented:  Thigh (Right) - 31 minutes  Total: Thigh (Right) - 31 minutes       Implants:   Implant Name Type Inv.  Item Serial No.  Lot No. LRB No. Used Action   CEMENT BNE 40GM FULL DOSE PMMA W/O GENT M VISC N RADPQ LNG - SNA  CEMENT BNE 40GM FULL DOSE PMMA W/O GENT M VISC N RADPQ LNG NA [employer]WD [id number] Right 2 Implanted   COMPONENT FEM SZ 6 R KNEE CO CHROM JIMMY CRUCE RET CONNIE FOR - SNA  COMPONENT FEM SZ 6 R KNEE CO CHROM JIMMY CRUCE RET CONNIE FOR NA [employer] [id number] Right 1 Implanted   TIB PRN NP STM 5 DEG SZ DR -- PERSONA - SNA  TIB PRN NP STM 5 DEG SZ DR -- Clydia Lines NA [employer] [id number] Right 1 Implanted   PAT PSN VE POLY 32MM -- PERSONA - SNA  PAT PSN VE POLY 32MM -- PERSONA NA [employer] [id number] Right 1 Implanted   PSN MC VE ASF R 11MM 6-7/CD - SNA  PSN MC VE ASF R 11MM 6-7/CD NA [employer] [id number] Right 1 Implanted        Femur Size: 6    Tibia Size: D    Patella: 32    Tibial Poly: 6R MC CD 11mm    Condition: Stable    Findings: DJD    Estimated Blood Loss:    120cc         Drains: None    Specimens: * No specimens in log *      Complications:  None; patient tolerated the procedure well    Signed By: Colby Jeffrey MD (3/3/2023 at 12:48 PM)

## 2023-03-06 NOTE — PROGRESS NOTES
[facility]  [facility] Orthopaedic Pathway Handoff     FROM:                                TO: At [address]                                                      ([facility] or Facility name)  [address]  [facility] PACU  [address]  [address]  Dept: [address]: [phone]                                      Room#:  PACU/PL                                                       Nurse Navigator: Todd Wisdom RN         SITUATION      ASAScore: ASA 2 - Patient with mild systemic disease with no functional limitations    Admitted:  3/3/2023  Hospital Day: 1      Attending Provider:  No att. providers found     Consultations:  None    PCP:  David Collier MD   [phone]     Admitting Dx:  DJD RIGHT KNEE       Active Problems:    * No active hospital problems. *    3 Days Post-Op of   Procedure(s):  RIGHT PARTIAL KNEE REPLACEMENT  VERSUS TOTAL KNEE REPLACEMENT (FAST TRACK) (ANES SPINAL/BLOCK) (REQ FLIP)   BY: Colby Damian MD (Jody)             ON: 3/3/2023                  Code Status: No Order             Advance Directive? No Doesnt Have (Send w/patient)     Isolation:  There are currently no Active Isolations       MDRO: No current active infections    BACKGROUND     Allergies:   Allergies   Allergen Reactions    Ambien Cr [Zolpidem] Other (comments)     Agitation & mood lability; higher dosages only    Codeine Nausea Only    Gluten Other (comments)    Requip [Ropinirole] Nausea Only       Past Medical History:   Diagnosis Date    Arthritis     Left great toe, R knee, L thumb    Celiac syndrome     by blood testing    Iron deficiency     Restless legs     Seasonal affective disorder [facility])        Past Surgical History:   Procedure Laterality Date    ENDOSCOPY, COLON, DIAGNOSTIC  02/13/2009    benign sessile polyp in 2013 - repeat in 5 years    [facility] OTOPLASTY PROTRUDING EAR W/WO SIZE RDCTJ Bilateral 2019       Prior to Admission Medications   Prescriptions Last Dose Informant Patient Reported? Taking? OTHER 2/24/2023  Yes Yes   Sig: Anatoly pro once daily   OTHER   Yes No   cholecalciferol, vitamin D3, 50 mcg (2,000 unit) tab 2/24/2023  Yes Yes   Sig: Take 2,000 Units by mouth daily. citalopram (CELEXA) 10 mg tablet 3/3/2023  No Yes   Sig: Take 1.5 Tablets by mouth daily. Patient taking differently: Take 20 mg by mouth every morning. clonazePAM (KlonoPIN) 0.5 mg tablet 3/2/2023  No Yes   Sig: TAKE 1 AND 1/2 TABLET BY MOUTH EVERY NIGHT AT BEDTIME AS NEEDED   diclofenac EC (VOLTAREN) 75 mg EC tablet 2/26/2023  Yes No   Sig: Take  by mouth two (2) times a day. estradiol (VAGIFEM) 10 mcg tab vaginal tablet 3/2/2023  Yes Yes   Sig: Insert 10 mcg into vagina. Every 5 days   estradiol 0.025 mg/24 hr ptsw 2/1/2023  Yes No   Sig: Apply  to affected area. Every 5 days   pramipexole (MIRAPEX) 0.75 mg tablet 3/2/2023  No Yes   Sig: TAKE ONE TABLET BY MOUTH EVERY DAY   Patient taking differently: nightly. progesterone (PROMETRIUM) 100 mg capsule 3/2/2023  Yes Yes   Sig: Take 100 mg by mouth nightly. valACYclovir (VALTREX) 500 mg tablet 2/10/2023  No No   Sig: Take 1 Tab by mouth two (2) times a day. For 1-3 days as needed. Facility-Administered Medications: None       Vaccinations:    Immunization History   Administered Date(s) Administered    COVID-19, MODERNA BLUE border, Primary or Immunocompromised, (age 18y+), IM, 100 mcg/0.5mL 03/19/2021, 04/16/2021    COVID-19, MODERNA Bivalent BOOSTER, (age 18y+), IM, 50 mcg/0.5 mL 12/13/2022    COVID-19, MODERNA Booster BLUE border, (age 18y+), IM, 50mcg/0.25mL 01/10/2022    Hep B Vaccine 03/24/1998, 03/24/1998, 04/28/1998    Tdap 12/17/2008         ASSESSMENT   Age: 75 y.o.              Gender: female        Height: Height: 5' 4\" (162.6 cm)                    Weight:Weight: 45.8 kg (100 lb 15.5 oz)     No data found. Active Orders   There are no active orders of the following types: Diet. Orientation:      Active Lines/Drains:  (Peg Tube / Parra / CL or S/L?):no    Urinary Status: Voiding      Last BM:       Skin Integrity: Incision (comment) (R knee)                        Gait Training  Assistive Device: Walker, rolling, Gait belt  Ambulation - Level of Assistance: Supervision  Distance (ft): 50 Feet (ft)  Stairs - Level of Assistance: Contact guard assistance  Number of Stairs Trained: 8 (2 sets of 4 (4 with left rail and cane/4 with left cane and PT on Right)  Rail Use: Left  (Left rail and cane x 4 steps, Left Rail and PT on right for 4 steps)  Interventions: Safety awareness training, Verbal cues     On Anticoagulation? YES  Aspirin                                         Pain Medications given:  Oxycodone                                   Lab Results   Component Value Date/Time    Glucose 87 02/16/2023 08:49 AM    Hemoglobin A1c 5.6 02/16/2023 08:49 AM    INR 1.0 02/16/2023 08:49 AM    HGB 11.2 (L) 02/16/2023 08:49 AM    HGB 12.0 11/07/2022 01:45 PM    HGB 11.1 (L) 12/01/2021 10:25 AM    HGB 12.4 11/09/2020 10:13 AM       Readmission Risks:  Score:         RECOMMENDATION     See After Visit Summary (AVS) for:  Discharge instructions  After [facility]   Medication Reconciliation          [facility] Orthopaedic Nurse Navigator  YOAN Mariscal, RN      Office  [phone]  Fax      [phone]  [email or web address]             . Yossi

## 2023-03-07 NOTE — PROGRESS NOTES
Post Discharge Follow-up contact after Joint Replacement    Patient discharged on 3/3/23  By  Colby Rider (Jody)   following  right Knee Arthroplasty. Spoke with patient today, who reports that \" She is doing very well. \"  Denies Fever, Shortness of Breath or Chest Pain. Home Health has visited. Patient also reports:  Surgical dressing clean, dry, intact  Calf is non-tender, operative extremity has moderate swelling. Pain is well managed. Discussed use of ice & elevation. Patient is progressing with therapy and is exercising independently. Taking Aspirin for anticoagulation, Oxycodone for pain. Patient is not experiencing symptoms of constipation & urinating without difficulty. Discussed side effects of anticoagulants & pain medications (bleeding/bruising, constipation, lightheaded/dizziness)  Follow up appointment is scheduled; but patient states plan to schedule. Discussed calling surgeon Dr. Moreno Cameron  for drainage, bleeding, swelling in operative extremity, fever or pain.

## 2023-03-11 DIAGNOSIS — F41.9 ANXIETY: ICD-10-CM

## 2023-03-11 DIAGNOSIS — G47.9 SLEEP DISORDER: ICD-10-CM

## 2023-03-12 RX ORDER — CLONAZEPAM 0.5 MG/1
TABLET ORAL
Qty: 45 TABLET | Refills: 0 | Status: SHIPPED | OUTPATIENT
Start: 2023-03-12

## 2023-03-13 DIAGNOSIS — F41.9 ANXIETY: ICD-10-CM

## 2023-03-13 DIAGNOSIS — G47.9 SLEEP DISORDER: ICD-10-CM

## 2023-03-13 RX ORDER — CLONAZEPAM 0.5 MG/1
TABLET ORAL
Qty: 45 TABLET | Refills: 0 | OUTPATIENT
Start: 2023-03-13

## 2023-03-13 NOTE — TELEPHONE ENCOUNTER
Requested Prescriptions     Pending Prescriptions Disp Refills    clonazePAM (KlonoPIN) 0.5 mg tablet 45 Tablet 0

## 2023-03-13 NOTE — TELEPHONE ENCOUNTER
Orders Placed This Encounter    clonazePAM (KlonoPIN) 0.5 mg tablet     Sig: TAKE 1 AND 1/2 TABLET BY MOUTH EVERY NIGHT AT BEDTIME AS NEEDED     Dispense:  45 Tablet     Refill:  0        reviewed 3/12/2023

## 2023-03-16 ENCOUNTER — TRANSCRIBE ORDER (OUTPATIENT)
Dept: SCHEDULING | Age: 68
End: 2023-03-16

## 2023-03-16 ENCOUNTER — HOSPITAL ENCOUNTER (OUTPATIENT)
Dept: ULTRASOUND IMAGING | Age: 68
Discharge: HOME | End: 2023-03-16
Attending: ORTHOPAEDIC SURGERY
Payer: MEDICARE

## 2023-03-16 DIAGNOSIS — M17.31 POST-TRAUMATIC OSTEOARTHRITIS OF RIGHT KNEE: ICD-10-CM

## 2023-03-16 DIAGNOSIS — M17.31 POST-TRAUMATIC OSTEOARTHRITIS OF RIGHT KNEE: Primary | ICD-10-CM

## 2023-03-16 DIAGNOSIS — I82.401 DEEP VEIN THROMBOSIS OF RIGHT LOWER LIMB (HCC): ICD-10-CM

## 2023-03-16 DIAGNOSIS — M79.89 SWELLING OF LIMB: ICD-10-CM

## 2023-03-16 PROCEDURE — 93971 EXTREMITY STUDY: CPT

## 2023-04-24 DIAGNOSIS — G47.9 SLEEP DISORDER: ICD-10-CM

## 2023-04-24 DIAGNOSIS — F41.9 ANXIETY: ICD-10-CM

## 2023-04-24 DIAGNOSIS — G25.81 RESTLESS LEGS SYNDROME: ICD-10-CM

## 2023-04-25 RX ORDER — CLONAZEPAM 0.5 MG/1
TABLET ORAL
Qty: 45 TABLET | Refills: 0 | Status: SHIPPED | OUTPATIENT
Start: 2023-04-25

## 2023-04-25 RX ORDER — PRAMIPEXOLE DIHYDROCHLORIDE 0.75 MG/1
0.75 TABLET ORAL
Qty: 90 TABLET | Refills: 0 | Status: SHIPPED | OUTPATIENT
Start: 2023-04-25

## 2023-04-25 NOTE — TELEPHONE ENCOUNTER
Orders Placed This Encounter    clonazePAM (KlonoPIN) 0.5 mg tablet     Sig: TAKE 1 AND 1/2 TABLET BY MOUTH EVERY NIGHT AT BEDTIME AS NEEDED     Dispense:  45 Tablet     Refill:  0    pramipexole (MIRAPEX) 0.75 mg tablet     Sig: Take 1 Tablet by mouth nightly.      Dispense:  90 Tablet     Refill:  0        reviewed 4/25/2023

## 2023-05-06 ENCOUNTER — PATIENT MESSAGE (OUTPATIENT)
Age: 68
End: 2023-05-06

## 2023-05-06 DIAGNOSIS — F33.8 OTHER RECURRENT DEPRESSIVE DISORDERS (HCC): ICD-10-CM

## 2023-05-06 DIAGNOSIS — G25.81 RESTLESS LEGS SYNDROME: Primary | ICD-10-CM

## 2023-05-06 DIAGNOSIS — G47.9 SLEEP DISORDER, UNSPECIFIED: ICD-10-CM

## 2023-05-11 ENCOUNTER — TELEPHONE (OUTPATIENT)
Age: 68
End: 2023-05-11

## 2023-05-14 ENCOUNTER — ANESTHESIA EVENT (OUTPATIENT)
Facility: HOSPITAL | Age: 68
End: 2023-05-14
Payer: MEDICARE

## 2023-05-15 ENCOUNTER — HOSPITAL ENCOUNTER (OUTPATIENT)
Facility: HOSPITAL | Age: 68
Setting detail: OUTPATIENT SURGERY
Discharge: HOME | End: 2023-05-15
Attending: ORTHOPAEDIC SURGERY | Admitting: ORTHOPAEDIC SURGERY
Payer: MEDICARE

## 2023-05-15 ENCOUNTER — TELEPHONE (OUTPATIENT)
Age: 68
End: 2023-05-15

## 2023-05-15 ENCOUNTER — ANESTHESIA (OUTPATIENT)
Facility: HOSPITAL | Age: 68
End: 2023-05-15
Payer: MEDICARE

## 2023-05-15 VITALS
OXYGEN SATURATION: 99 % | RESPIRATION RATE: 16 BRPM | HEIGHT: 64 IN | TEMPERATURE: 97.6 F | BODY MASS INDEX: 17.93 KG/M2 | WEIGHT: 105 LBS | HEART RATE: 64 BPM | SYSTOLIC BLOOD PRESSURE: 117 MMHG | DIASTOLIC BLOOD PRESSURE: 72 MMHG

## 2023-05-15 DIAGNOSIS — M17.11 PRIMARY OSTEOARTHRITIS OF RIGHT KNEE: Primary | ICD-10-CM

## 2023-05-15 PROCEDURE — 3700000001 HC ADD 15 MINUTES (ANESTHESIA): Performed by: ORTHOPAEDIC SURGERY

## 2023-05-15 PROCEDURE — 2580000003 HC RX 258: Performed by: NURSE ANESTHETIST, CERTIFIED REGISTERED

## 2023-05-15 PROCEDURE — 7100000000 HC PACU RECOVERY - FIRST 15 MIN: Performed by: ORTHOPAEDIC SURGERY

## 2023-05-15 PROCEDURE — 6370000000 HC RX 637 (ALT 250 FOR IP): Performed by: ANESTHESIOLOGY

## 2023-05-15 PROCEDURE — 3700000000 HC ANESTHESIA ATTENDED CARE: Performed by: ORTHOPAEDIC SURGERY

## 2023-05-15 PROCEDURE — 3600000002 HC SURGERY LEVEL 2 BASE: Performed by: ORTHOPAEDIC SURGERY

## 2023-05-15 PROCEDURE — 6360000002 HC RX W HCPCS: Performed by: NURSE ANESTHETIST, CERTIFIED REGISTERED

## 2023-05-15 PROCEDURE — 3600000012 HC SURGERY LEVEL 2 ADDTL 15MIN: Performed by: ORTHOPAEDIC SURGERY

## 2023-05-15 PROCEDURE — 6360000002 HC RX W HCPCS: Performed by: ANESTHESIOLOGY

## 2023-05-15 PROCEDURE — 6360000002 HC RX W HCPCS: Performed by: ORTHOPAEDIC SURGERY

## 2023-05-15 PROCEDURE — 7100000001 HC PACU RECOVERY - ADDTL 15 MIN: Performed by: ORTHOPAEDIC SURGERY

## 2023-05-15 PROCEDURE — 2709999900 HC NON-CHARGEABLE SUPPLY: Performed by: ORTHOPAEDIC SURGERY

## 2023-05-15 PROCEDURE — 7100000010 HC PHASE II RECOVERY - FIRST 15 MIN: Performed by: ORTHOPAEDIC SURGERY

## 2023-05-15 PROCEDURE — 2500000003 HC RX 250 WO HCPCS: Performed by: NURSE ANESTHETIST, CERTIFIED REGISTERED

## 2023-05-15 RX ORDER — SODIUM CHLORIDE, SODIUM LACTATE, POTASSIUM CHLORIDE, CALCIUM CHLORIDE 600; 310; 30; 20 MG/100ML; MG/100ML; MG/100ML; MG/100ML
INJECTION, SOLUTION INTRAVENOUS CONTINUOUS
Status: DISCONTINUED | OUTPATIENT
Start: 2023-05-15 | End: 2023-05-15 | Stop reason: HOSPADM

## 2023-05-15 RX ORDER — LIDOCAINE HYDROCHLORIDE 10 MG/ML
1 INJECTION, SOLUTION EPIDURAL; INFILTRATION; INTRACAUDAL; PERINEURAL
Status: DISCONTINUED | OUTPATIENT
Start: 2023-05-15 | End: 2023-05-15 | Stop reason: HOSPADM

## 2023-05-15 RX ORDER — METHYLPREDNISOLONE ACETATE 40 MG/ML
INJECTION, SUSPENSION INTRA-ARTICULAR; INTRALESIONAL; INTRAMUSCULAR; SOFT TISSUE PRN
Status: DISCONTINUED | OUTPATIENT
Start: 2023-05-15 | End: 2023-05-15 | Stop reason: HOSPADM

## 2023-05-15 RX ORDER — ONDANSETRON 2 MG/ML
4 INJECTION INTRAMUSCULAR; INTRAVENOUS
Status: DISCONTINUED | OUTPATIENT
Start: 2023-05-15 | End: 2023-05-15 | Stop reason: HOSPADM

## 2023-05-15 RX ORDER — SODIUM CHLORIDE, SODIUM LACTATE, POTASSIUM CHLORIDE, AND CALCIUM CHLORIDE .6; .31; .03; .02 G/100ML; G/100ML; G/100ML; G/100ML
INJECTION, SOLUTION INTRAVENOUS PRN
Status: DISCONTINUED | OUTPATIENT
Start: 2023-05-15 | End: 2023-05-15 | Stop reason: SDUPTHER

## 2023-05-15 RX ORDER — MIDAZOLAM HYDROCHLORIDE 1 MG/ML
INJECTION INTRAMUSCULAR; INTRAVENOUS PRN
Status: DISCONTINUED | OUTPATIENT
Start: 2023-05-15 | End: 2023-05-15 | Stop reason: SDUPTHER

## 2023-05-15 RX ORDER — SODIUM CHLORIDE 9 MG/ML
INJECTION, SOLUTION INTRAVENOUS PRN
Status: DISCONTINUED | OUTPATIENT
Start: 2023-05-15 | End: 2023-05-15 | Stop reason: HOSPADM

## 2023-05-15 RX ORDER — FENTANYL CITRATE 50 UG/ML
25 INJECTION, SOLUTION INTRAMUSCULAR; INTRAVENOUS EVERY 5 MIN PRN
Status: DISCONTINUED | OUTPATIENT
Start: 2023-05-15 | End: 2023-05-15 | Stop reason: HOSPADM

## 2023-05-15 RX ORDER — FENTANYL CITRATE 50 UG/ML
100 INJECTION, SOLUTION INTRAMUSCULAR; INTRAVENOUS
Status: DISCONTINUED | OUTPATIENT
Start: 2023-05-15 | End: 2023-05-15 | Stop reason: HOSPADM

## 2023-05-15 RX ORDER — MIDAZOLAM HYDROCHLORIDE 2 MG/2ML
2 INJECTION, SOLUTION INTRAMUSCULAR; INTRAVENOUS
Status: DISCONTINUED | OUTPATIENT
Start: 2023-05-15 | End: 2023-05-15 | Stop reason: HOSPADM

## 2023-05-15 RX ORDER — SODIUM CHLORIDE 0.9 % (FLUSH) 0.9 %
5-40 SYRINGE (ML) INJECTION EVERY 12 HOURS SCHEDULED
Status: DISCONTINUED | OUTPATIENT
Start: 2023-05-15 | End: 2023-05-15 | Stop reason: HOSPADM

## 2023-05-15 RX ORDER — HYDROMORPHONE HYDROCHLORIDE 1 MG/ML
0.5 INJECTION, SOLUTION INTRAMUSCULAR; INTRAVENOUS; SUBCUTANEOUS EVERY 5 MIN PRN
Status: DISCONTINUED | OUTPATIENT
Start: 2023-05-15 | End: 2023-05-15 | Stop reason: HOSPADM

## 2023-05-15 RX ORDER — PROPOFOL 10 MG/ML
INJECTION, EMULSION INTRAVENOUS PRN
Status: DISCONTINUED | OUTPATIENT
Start: 2023-05-15 | End: 2023-05-15 | Stop reason: SDUPTHER

## 2023-05-15 RX ORDER — CLONAZEPAM 0.5 MG/1
TABLET ORAL
Qty: 45 TABLET | Refills: 0 | Status: SHIPPED | OUTPATIENT
Start: 2023-05-15 | End: 2023-06-15

## 2023-05-15 RX ORDER — ACETAMINOPHEN 500 MG
1000 TABLET ORAL ONCE
Status: COMPLETED | OUTPATIENT
Start: 2023-05-15 | End: 2023-05-15

## 2023-05-15 RX ORDER — BUPIVACAINE HYDROCHLORIDE 5 MG/ML
INJECTION, SOLUTION PERINEURAL PRN
Status: DISCONTINUED | OUTPATIENT
Start: 2023-05-15 | End: 2023-05-15 | Stop reason: HOSPADM

## 2023-05-15 RX ORDER — OXYCODONE HYDROCHLORIDE 5 MG/1
5 TABLET ORAL
Status: DISCONTINUED | OUTPATIENT
Start: 2023-05-15 | End: 2023-05-15 | Stop reason: HOSPADM

## 2023-05-15 RX ORDER — PROCHLORPERAZINE EDISYLATE 5 MG/ML
5 INJECTION INTRAMUSCULAR; INTRAVENOUS
Status: DISCONTINUED | OUTPATIENT
Start: 2023-05-15 | End: 2023-05-15 | Stop reason: HOSPADM

## 2023-05-15 RX ORDER — LIDOCAINE HYDROCHLORIDE 20 MG/ML
INJECTION, SOLUTION EPIDURAL; INFILTRATION; INTRACAUDAL; PERINEURAL PRN
Status: DISCONTINUED | OUTPATIENT
Start: 2023-05-15 | End: 2023-05-15 | Stop reason: SDUPTHER

## 2023-05-15 RX ORDER — OXYCODONE HYDROCHLORIDE 5 MG/1
5 TABLET ORAL EVERY 6 HOURS PRN
Qty: 41 TABLET | Refills: 0 | Status: SHIPPED | OUTPATIENT
Start: 2023-05-15 | End: 2023-05-22

## 2023-05-15 RX ORDER — SODIUM CHLORIDE 0.9 % (FLUSH) 0.9 %
5-40 SYRINGE (ML) INJECTION PRN
Status: DISCONTINUED | OUTPATIENT
Start: 2023-05-15 | End: 2023-05-15 | Stop reason: HOSPADM

## 2023-05-15 RX ORDER — HYDRALAZINE HYDROCHLORIDE 20 MG/ML
10 INJECTION INTRAMUSCULAR; INTRAVENOUS
Status: DISCONTINUED | OUTPATIENT
Start: 2023-05-15 | End: 2023-05-15 | Stop reason: HOSPADM

## 2023-05-15 RX ORDER — FENTANYL CITRATE 50 UG/ML
INJECTION, SOLUTION INTRAMUSCULAR; INTRAVENOUS PRN
Status: DISCONTINUED | OUTPATIENT
Start: 2023-05-15 | End: 2023-05-15 | Stop reason: SDUPTHER

## 2023-05-15 RX ADMIN — FENTANYL CITRATE 25 MCG: 50 INJECTION, SOLUTION INTRAMUSCULAR; INTRAVENOUS at 07:38

## 2023-05-15 RX ADMIN — LIDOCAINE HYDROCHLORIDE 60 MG: 20 INJECTION, SOLUTION EPIDURAL; INFILTRATION; INTRACAUDAL; PERINEURAL at 07:32

## 2023-05-15 RX ADMIN — PROPOFOL 25 MG: 10 INJECTION, EMULSION INTRAVENOUS at 07:34

## 2023-05-15 RX ADMIN — FENTANYL CITRATE 25 MCG: 50 INJECTION, SOLUTION INTRAMUSCULAR; INTRAVENOUS at 07:34

## 2023-05-15 RX ADMIN — SODIUM CHLORIDE, SODIUM LACTATE, POTASSIUM CHLORIDE, AND CALCIUM CHLORIDE 500 ML: 600; 310; 30; 20 INJECTION, SOLUTION INTRAVENOUS at 07:50

## 2023-05-15 RX ADMIN — MIDAZOLAM 1 MG: 1 INJECTION INTRAMUSCULAR; INTRAVENOUS at 07:34

## 2023-05-15 RX ADMIN — ACETAMINOPHEN 1000 MG: 500 TABLET ORAL at 06:36

## 2023-05-15 RX ADMIN — PROPOFOL 25 MG: 10 INJECTION, EMULSION INTRAVENOUS at 07:33

## 2023-05-15 RX ADMIN — FENTANYL CITRATE 25 MCG: 50 INJECTION, SOLUTION INTRAMUSCULAR; INTRAVENOUS at 08:06

## 2023-05-15 RX ADMIN — MIDAZOLAM 1 MG: 1 INJECTION INTRAMUSCULAR; INTRAVENOUS at 07:30

## 2023-05-15 RX ADMIN — PROPOFOL 50 MG: 10 INJECTION, EMULSION INTRAVENOUS at 07:35

## 2023-05-15 RX ADMIN — FENTANYL CITRATE 25 MCG: 50 INJECTION, SOLUTION INTRAMUSCULAR; INTRAVENOUS at 08:17

## 2023-05-15 ASSESSMENT — PAIN - FUNCTIONAL ASSESSMENT
PAIN_FUNCTIONAL_ASSESSMENT: 0-10
PAIN_FUNCTIONAL_ASSESSMENT: 0-10

## 2023-05-15 ASSESSMENT — PAIN DESCRIPTION - DESCRIPTORS
DESCRIPTORS: ACHING
DESCRIPTORS: SORE
DESCRIPTORS: ACHING

## 2023-05-15 ASSESSMENT — PAIN DESCRIPTION - ORIENTATION
ORIENTATION: RIGHT
ORIENTATION: RIGHT

## 2023-05-15 ASSESSMENT — PAIN SCALES - GENERAL
PAINLEVEL_OUTOF10: 4
PAINLEVEL_OUTOF10: 3

## 2023-05-15 ASSESSMENT — PAIN DESCRIPTION - LOCATION
LOCATION: KNEE
LOCATION: KNEE

## 2023-05-15 NOTE — TELEPHONE ENCOUNTER
Pharmacy called to confirm that it was ok to fill clomazepam early and also make sure patient can take medication along with new pain medication.  Per pcp both of these are ok

## 2023-05-15 NOTE — TELEPHONE ENCOUNTER
Reason for call:  received a call form the pharmacy, tranfers call to Petros Arita    Is this a new problem: yes,       Date of last appointment:  12/27/2022     Can we respond via [employer]: No    Best call back number:

## 2023-05-15 NOTE — TELEPHONE ENCOUNTER
From: Clover Delgadillo  To: Dr. Orquidea Mills  Sent: 5/6/2023 1:48 PM EDT  Subject: Clonazepam spill! Kathya Bustamante, Dr. Isreal Ricks.   I had my Clonazepam refilled on 4-25-23. The next day, I was getting a pill out of the prescription bottle, which was on the ledge of my bathroom sink, and the bottle spilled into the sink. Ordinarily, I could have retrieved more of the spilled pills, but I now have a new \"push and lock\" kind of drain, and wasn't able to get to them in time. I lost about half of the pills (25 of the 45) that were in the bottle. I called that same day to leave a message, but whoever answered asked if I had left a message in [place], and suggested that I do that, instead of leaving a voice message, which I forgot to do until now. Since I know this is a controlled substance, I didn't think it would make sense to go to [facility], but would like to replace the 25 pills that went down the drain! (I have never had this happen before, but will not open the bottle near the sink the next time.)    I still have a good supply left, just didn't want to forget and then run out early. Thank you.  Lawyer Kehr

## 2023-05-15 NOTE — H&P
Clover Delgadillo is a 70 y.o. female. Pain Score: 4   Chief Complaint: Post-op of the Right Knee     Date of Surgery: 3/3/2023           79-year-old female back in follow-up for total knee. She is about 8 weeks out now. She has really struggled last 2 weeks with her motion. We have been making good progress but unfortunately things have stopped and in the last 2 weeks she only made 5 degrees of flexion improvement. She states that she really did not tolerate therapy well after the a more aggressive stretching and activity and had to stop early. She has really been uncomfortable ever since. She is concerned about a manipulation which we discussed in the last visit.      Review of Systems   5/2/2023  Constitutional: Unexplained: Negative  Genitourinary: Frequent Urination: Negative  HEENT: Vision Loss: Negative  Neurological: Memory Loss: Negative  Integumentary: Rash: Negative  Cardiovascular: Palpatations: Negative  Hematologic: Bruises/Bleeds Easily: Positive  Gastrointestinal: Constipation: Negative  Immunological: Seasonal Allergies: Positive  Musculoskeletal: Joint Pain: Positive  Objective:      Vitals       Vitals:     05/02/23 1050   Weight: 104 lb   Height: 5' 4\"           Patient Active Problem List    Diagnosis Date Noted    Mild depression 12/17/2018    Advance directive discussed with patient 05/13/2016    Depression with anxiety 02/06/2015    Lumbar disc disease with radiculopathy 01/03/2014    Herpetic lesion 12/03/2013    History of screening mammography 11/29/2012    Pap smear for cervical cancer screening 11/29/2012    History of bone density study 11/29/2012    Colon cancer screening 11/29/2012    Sleep disorder 05/15/2012    Iron deficiency 05/15/2012    Age-related osteoporosis without current pathological fracture 05/15/2012    Celiac disease 05/15/2012    Restless legs syndrome 05/15/2012     Past Medical History:   Diagnosis Date    Arthritis     Left great toe, R knee, L thumb

## 2023-05-15 NOTE — ANESTHESIA POSTPROCEDURE EVALUATION
Department of Anesthesiology  Postprocedure Note    Patient: Jenifer Florence  MRN: [id number]  YOB: 1955  Date of evaluation: 5/15/2023      Procedure Summary     Date: 05/15/23 Room / Location: [facility] MAIN OR [address] [place] [address] MAIN OR    Anesthesia Start: 0730 Anesthesia Stop: 3659    Procedure: RIGHT KNEE MANIPULATION UNDER ANESTHESIA (Right: Knee) Diagnosis:       Fibrosis of right knee joint      S/P TKR (total knee replacement) not using cement, right      (Fibrosis of right knee joint [M24.661])      (S/P TKR (total knee replacement) not using cement, right [R56.977])    Providers: Alice Green MD (Jody) Responsible Provider: Diego Black MD    Anesthesia Type: MAC ASA Status: 2          Anesthesia Type: MAC    Dallas Phase I: Dallas Score: 5    Dallas Phase II:        Anesthesia Post Evaluation    Patient location during evaluation: PACU  Patient participation: complete - patient participated  Level of consciousness: awake  Airway patency: patent  Nausea & Vomiting: no nausea  Complications: no  Cardiovascular status: blood pressure returned to baseline and hemodynamically stable  Respiratory status: acceptable  Hydration status: stable  Multimodal analgesia pain management approach

## 2023-05-15 NOTE — ANESTHESIA PRE PROCEDURE
Department of Anesthesiology  Preprocedure Note       Name:  Damion Castellano   Age:  70 y.o.  :  1955                                          MRN:  [id number]         Date:  5/15/2023      Surgeon: Elisabet Villatoro):  Alice Boykin MD (Jody)    Procedure: Procedure(s):  RIGHT KNEE MANIPULATION UNDER ANESTHESIA    Medications prior to admission:   Prior to Admission medications    Medication Sig Start Date End Date Taking?  Authorizing Provider   NONFORMULARY Sharmaine pro once daily    Historical Provider, MD   Cholecalciferol 50 MCG (2000) TABS Take 1 tablet by mouth daily    Ar Automatic Reconciliation   citalopram (CELEXA) 10 MG tablet Take 15 mg by mouth daily  Patient not taking: Reported on 22   Ar Automatic Reconciliation   clonazePAM (KLONOPIN) 0.5 MG tablet TAKE 1 AND 1/2 TABLET BY MOUTH EVERY NIGHT AT BEDTIME AS NEEDED 3/12/23   Ar Automatic Reconciliation   diclofenac (VOLTAREN) 75 MG EC tablet Take by mouth 2 times daily  Patient not taking: Reported on 2023    Ar Automatic Reconciliation   Estradiol (VAGIFEM) 10 MCG TABS vaginal tablet Place 1 tablet vaginally PT TAKES EVERY 5 DAYS 17   Ar Automatic Reconciliation   estradiol 0.025 MG/24HR PTTW Apply 1 patch topically once a week PT TAKES EVERY 5 DAYS 17   Ar Automatic Reconciliation   pramipexole (MIRAPEX) 0.75 MG tablet 1 tablet nightly 22   Ar Automatic Reconciliation   progesterone (PROMETRIUM) 100 MG CAPS capsule Take 1 capsule by mouth 17   Ar Automatic Reconciliation   valACYclovir (VALTREX) 500 MG tablet Take 1 tablet by mouth as needed 16   Ar Automatic Reconciliation       Current medications:    Current Facility-Administered Medications   Medication Dose Route Frequency Provider Last Rate Last Admin    lidocaine PF 1 % injection 1 mL  1 mL IntraDERmal Once PRN Ino Sosa MD        fentaNYL (SUBLIMAZE) injection 100 mcg  100 mcg IntraVENous Once PRN MD Hugo Mayorga

## 2023-05-15 NOTE — PROGRESS NOTES
Discharge instructions reviewed with patient and family using teachback . All questions have been answered. Prescriptions sent to [facility]. Vital signs stable, pain appropriately managed. Patient wheeled off the unit with PACU staff.

## 2023-05-15 NOTE — TELEPHONE ENCOUNTER
Orders Placed This Encounter   Medications    clonazePAM (KLONOPIN) 0.5 MG tablet     Sig: TAKE 1 AND 1/2 TABLET BY MOUTH EVERY NIGHT AT BEDTIME AS NEEDED     Dispense:  45 tablet     Refill:  0        reviewed 5/15/2023

## 2023-05-15 NOTE — OP NOTE
Right Knee Manipulation Operative Report      Patient: Enrique Whittington MRN: [id number]  SSN: [id number]    YOB: 1955  Age: 70 y.o. Sex: female      Date of Surgery: [unfilled]    Preoperative Diagnosis:   1. Right knee arthrofibrosis s/p TKA    Postoperative Diagnosis:  1. Right knee arthrofibrosis s/p TKA    Procedures: 1. RIGHT KNEE MANIPULATION UNDER ANESTHESIA    Surgeon(s) and Role:     * Alice(Becca) Nick Dash MD - Primary     Anesthesia: Monitor Anesthesia Care    Procedures: RIGHT KNEE MANIPULATION UNDER ANESTHESIA    Pathology: Right Knee Arthrofibrosis      Indications: The patient is a 70 y.o. female who has a Stiff Post-op right Knee s/p TKA 8 weeks ago . The patient has exhausted nonoperative modalities and is electively admitted for outpatient right Knee manipulation. Procedure in Detail: Following identification the patient was taken to the operating suite. Following administration of anesthesia and interscalene block for postoperative pain control the patient was positioned on the operating table in the supine fashion. The Right knee was then examined under anesthesia and noted to be stable with limited range of motion. Motion was 95 degrees of forward flexion, and 0 degrees extension. At this point, the patient was then carefully positioned in the supine position. Care was taken to pad both the upper and lower extremities. The right knee was then carefully manipulated through the full range of motion. Adhesions were heard to release as the manipulation was preformed. Full range of motion of the right knee was obtained and the post-operative motion was equal to the opposite side. The knee was then injected intra-articularly with steroid. The patient was then awakened and transferred to the holding area in stable condition. Findings: post op right knee arthrofibrosis    Estimated Blood Loss:   None    Specimens: None    Signed: Alice Rosario MD ([unfilled] at 8:33 AM)

## 2023-05-16 RX ORDER — CLONAZEPAM 0.5 MG/1
TABLET ORAL
Qty: 45 TABLET | Refills: 0 | OUTPATIENT
Start: 2023-05-16

## 2023-06-29 DIAGNOSIS — G47.9 SLEEP DISORDER, UNSPECIFIED: ICD-10-CM

## 2023-06-29 DIAGNOSIS — F33.8 OTHER RECURRENT DEPRESSIVE DISORDERS (HCC): ICD-10-CM

## 2023-06-29 DIAGNOSIS — G25.81 RESTLESS LEGS SYNDROME: ICD-10-CM

## 2023-06-29 RX ORDER — CLONAZEPAM 0.5 MG/1
TABLET ORAL
Qty: 10 TABLET | Refills: 0 | Status: SHIPPED | OUTPATIENT
Start: 2023-06-29 | End: 2023-07-06

## 2023-07-10 ENCOUNTER — TELEPHONE (OUTPATIENT)
Age: 68
End: 2023-07-10

## 2023-07-10 NOTE — TELEPHONE ENCOUNTER
----- Message from [facility] sent at 7/7/2023  3:54 PM EDT -----  Subject: Message to Provider    QUESTIONS  Information for Provider? pt would like to get a call back regarding what   her appt on 6.23 was for before she reschedules. Please respond via   [employer].  ---------------------------------------------------------------------------  --------------  Tamika RAMIRES  [phone]; OK to respond with electronic message via [employer] portal (only   for patients who have registered [employer] account)  ---------------------------------------------------------------------------  --------------  SCRIPT ANSWERS  Relationship to Patient?  Self
Advised patient of nurse message, scheduled appointment for 7/24/23.
no

## 2023-07-23 PROBLEM — F33.8 OTHER RECURRENT DEPRESSIVE DISORDERS (HCC): Status: ACTIVE | Noted: 2023-07-23

## 2023-07-23 SDOH — ECONOMIC STABILITY: INCOME INSECURITY: HOW HARD IS IT FOR YOU TO PAY FOR THE VERY BASICS LIKE FOOD, HOUSING, MEDICAL CARE, AND HEATING?: NOT HARD AT ALL

## 2023-07-23 SDOH — ECONOMIC STABILITY: FOOD INSECURITY: WITHIN THE PAST 12 MONTHS, YOU WORRIED THAT YOUR FOOD WOULD RUN OUT BEFORE YOU GOT MONEY TO BUY MORE.: NEVER TRUE

## 2023-07-23 SDOH — ECONOMIC STABILITY: HOUSING INSECURITY
IN THE LAST 12 MONTHS, WAS THERE A TIME WHEN YOU DID NOT HAVE A STEADY PLACE TO SLEEP OR SLEPT IN A SHELTER (INCLUDING NOW)?: NO

## 2023-07-23 SDOH — ECONOMIC STABILITY: FOOD INSECURITY: WITHIN THE PAST 12 MONTHS, THE FOOD YOU BOUGHT JUST DIDN'T LAST AND YOU DIDN'T HAVE MONEY TO GET MORE.: NEVER TRUE

## 2023-07-23 SDOH — ECONOMIC STABILITY: TRANSPORTATION INSECURITY
IN THE PAST 12 MONTHS, HAS LACK OF TRANSPORTATION KEPT YOU FROM MEETINGS, WORK, OR FROM GETTING THINGS NEEDED FOR DAILY LIVING?: NO

## 2023-07-24 ENCOUNTER — OFFICE VISIT (OUTPATIENT)
Age: 68
End: 2023-07-24
Payer: MEDICARE

## 2023-07-24 VITALS
DIASTOLIC BLOOD PRESSURE: 78 MMHG | BODY MASS INDEX: 18.78 KG/M2 | RESPIRATION RATE: 16 BRPM | TEMPERATURE: 98.6 F | SYSTOLIC BLOOD PRESSURE: 124 MMHG | HEIGHT: 64 IN | WEIGHT: 110 LBS | HEART RATE: 87 BPM | OXYGEN SATURATION: 97 %

## 2023-07-24 DIAGNOSIS — G47.9 SLEEP DISORDER, UNSPECIFIED: ICD-10-CM

## 2023-07-24 DIAGNOSIS — G25.81 RESTLESS LEGS SYNDROME: Primary | ICD-10-CM

## 2023-07-24 DIAGNOSIS — F33.8 OTHER RECURRENT DEPRESSIVE DISORDERS (HCC): ICD-10-CM

## 2023-07-24 PROCEDURE — G8427 DOCREV CUR MEDS BY ELIG CLIN: HCPCS | Performed by: INTERNAL MEDICINE

## 2023-07-24 PROCEDURE — 99213 OFFICE O/P EST LOW 20 MIN: CPT | Performed by: INTERNAL MEDICINE

## 2023-07-24 PROCEDURE — G8420 CALC BMI NORM PARAMETERS: HCPCS | Performed by: INTERNAL MEDICINE

## 2023-07-24 PROCEDURE — 1036F TOBACCO NON-USER: CPT | Performed by: INTERNAL MEDICINE

## 2023-07-24 PROCEDURE — 1123F ACP DISCUSS/DSCN MKR DOCD: CPT | Performed by: INTERNAL MEDICINE

## 2023-07-24 PROCEDURE — 3017F COLORECTAL CA SCREEN DOC REV: CPT | Performed by: INTERNAL MEDICINE

## 2023-07-24 PROCEDURE — 1090F PRES/ABSN URINE INCON ASSESS: CPT | Performed by: INTERNAL MEDICINE

## 2023-07-24 PROCEDURE — G8399 PT W/DXA RESULTS DOCUMENT: HCPCS | Performed by: INTERNAL MEDICINE

## 2023-07-24 RX ORDER — CLONAZEPAM 0.5 MG/1
TABLET ORAL
Qty: 45 TABLET | Refills: 0 | Status: SHIPPED | OUTPATIENT
Start: 2023-07-24 | End: 2023-07-31

## 2023-07-24 ASSESSMENT — PATIENT HEALTH QUESTIONNAIRE - PHQ9
1. LITTLE INTEREST OR PLEASURE IN DOING THINGS: 0
8. MOVING OR SPEAKING SO SLOWLY THAT OTHER PEOPLE COULD HAVE NOTICED. OR THE OPPOSITE, BEING SO FIGETY OR RESTLESS THAT YOU HAVE BEEN MOVING AROUND A LOT MORE THAN USUAL: 0
7. TROUBLE CONCENTRATING ON THINGS, SUCH AS READING THE NEWSPAPER OR WATCHING TELEVISION: 0
6. FEELING BAD ABOUT YOURSELF - OR THAT YOU ARE A FAILURE OR HAVE LET YOURSELF OR YOUR FAMILY DOWN: 0
3. TROUBLE FALLING OR STAYING ASLEEP: 0
9. THOUGHTS THAT YOU WOULD BE BETTER OFF DEAD, OR OF HURTING YOURSELF: 0
SUM OF ALL RESPONSES TO PHQ QUESTIONS 1-9: 0
5. POOR APPETITE OR OVEREATING: 0
SUM OF ALL RESPONSES TO PHQ QUESTIONS 1-9: 0
2. FEELING DOWN, DEPRESSED OR HOPELESS: 0
SUM OF ALL RESPONSES TO PHQ QUESTIONS 1-9: 0
10. IF YOU CHECKED OFF ANY PROBLEMS, HOW DIFFICULT HAVE THESE PROBLEMS MADE IT FOR YOU TO DO YOUR WORK, TAKE CARE OF THINGS AT HOME, OR GET ALONG WITH OTHER PEOPLE: 0
SUM OF ALL RESPONSES TO PHQ9 QUESTIONS 1 & 2: 0
SUM OF ALL RESPONSES TO PHQ QUESTIONS 1-9: 0
4. FEELING TIRED OR HAVING LITTLE ENERGY: 0

## 2023-08-11 ENCOUNTER — PATIENT MESSAGE (OUTPATIENT)
Age: 68
End: 2023-08-11

## 2023-08-11 DIAGNOSIS — L98.9 SKIN LESION: Primary | ICD-10-CM

## 2023-08-11 DIAGNOSIS — G25.81 RESTLESS LEGS SYNDROME: ICD-10-CM

## 2023-08-11 DIAGNOSIS — G47.9 SLEEP DISORDER, UNSPECIFIED: ICD-10-CM

## 2023-08-11 DIAGNOSIS — F33.8 OTHER RECURRENT DEPRESSIVE DISORDERS (HCC): ICD-10-CM

## 2023-08-11 RX ORDER — CLONAZEPAM 0.5 MG/1
TABLET ORAL
Qty: 45 TABLET | Refills: 0 | Status: SHIPPED | OUTPATIENT
Start: 2023-08-11 | End: 2023-08-18

## 2023-08-11 RX ORDER — PRAMIPEXOLE DIHYDROCHLORIDE 0.75 MG/1
TABLET ORAL
Qty: 90 TABLET | Refills: 0 | OUTPATIENT
Start: 2023-08-11

## 2023-08-11 RX ORDER — PRAMIPEXOLE DIHYDROCHLORIDE 0.75 MG/1
TABLET ORAL
Qty: 90 TABLET | Refills: 1 | Status: SHIPPED | OUTPATIENT
Start: 2023-08-11

## 2023-08-11 RX ORDER — CLONAZEPAM 0.5 MG/1
TABLET ORAL
Qty: 45 TABLET | Refills: 0 | Status: CANCELLED | OUTPATIENT
Start: 2023-08-11 | End: 2023-08-18

## 2023-08-11 NOTE — TELEPHONE ENCOUNTER
Orders Placed This Encounter   Medications    clonazePAM (KLONOPIN) 0.5 MG tablet     Sig: TAKE 1 AND 1/2 TABLET BY MOUTH EVERY NIGHT AT BEDTIME AS NEEDED     Dispense:  45 tablet     Refill:  0    pramipexole (MIRAPEX) 0.75 MG tablet     Si tablet nightly     Dispense:  90 tablet     Refill:  1        reviewed 2023

## 2023-08-11 NOTE — TELEPHONE ENCOUNTER
From: Simeon Vázquez  To: Dr. Jason Bradford  Sent: 8/11/2023 3:23 PM EDT  Subject: Requesting refill of Pramipexole    Dr. Maria Dietz. I just tried to request a refill for two prescriptions through My Chart. The request for Clonazepam went through fine, but I was unable to request a refill for the Pramipexole. It said it could \"not be requested through My Chart. \" I don't know why? Kusum Pacheco will probably be contacting you about it.

## 2023-09-24 DIAGNOSIS — G25.81 RESTLESS LEGS SYNDROME: ICD-10-CM

## 2023-09-24 DIAGNOSIS — F33.8 OTHER RECURRENT DEPRESSIVE DISORDERS (HCC): ICD-10-CM

## 2023-09-24 DIAGNOSIS — G47.9 SLEEP DISORDER, UNSPECIFIED: ICD-10-CM

## 2023-09-25 RX ORDER — CLONAZEPAM 0.5 MG/1
TABLET ORAL
Qty: 45 TABLET | Refills: 0 | OUTPATIENT
Start: 2023-09-25 | End: 2023-10-01

## 2023-10-26 DIAGNOSIS — G47.9 SLEEP DISORDER, UNSPECIFIED: ICD-10-CM

## 2023-10-26 DIAGNOSIS — G25.81 RESTLESS LEGS SYNDROME: ICD-10-CM

## 2023-10-26 DIAGNOSIS — F33.8 OTHER RECURRENT DEPRESSIVE DISORDERS (HCC): ICD-10-CM

## 2023-10-26 RX ORDER — CLONAZEPAM 0.5 MG/1
TABLET ORAL
Qty: 45 TABLET | Refills: 0 | Status: SHIPPED | OUTPATIENT
Start: 2023-10-26 | End: 2023-11-26

## 2023-10-26 NOTE — TELEPHONE ENCOUNTER
Orders Placed This Encounter   Medications    clonazePAM (KLONOPIN) 0.5 MG tablet     Sig: TAKE 1 AND 1/2 TABLET BY MOUTH EVERY NIGHT AT BEDTIME AS NEEDED     Dispense:  45 tablet     Refill:  0        reviewed 10/26/2023

## 2023-12-08 ENCOUNTER — OFFICE VISIT (OUTPATIENT)
Age: 68
End: 2023-12-08
Payer: MEDICARE

## 2023-12-08 VITALS
WEIGHT: 110.2 LBS | DIASTOLIC BLOOD PRESSURE: 60 MMHG | TEMPERATURE: 97.4 F | BODY MASS INDEX: 18.82 KG/M2 | OXYGEN SATURATION: 98 % | SYSTOLIC BLOOD PRESSURE: 110 MMHG | HEIGHT: 64 IN | RESPIRATION RATE: 16 BRPM | HEART RATE: 99 BPM

## 2023-12-08 DIAGNOSIS — R22.1 NECK MASS: Primary | ICD-10-CM

## 2023-12-08 PROCEDURE — 99213 OFFICE O/P EST LOW 20 MIN: CPT | Performed by: INTERNAL MEDICINE

## 2023-12-08 PROCEDURE — G8428 CUR MEDS NOT DOCUMENT: HCPCS | Performed by: INTERNAL MEDICINE

## 2023-12-08 PROCEDURE — 3017F COLORECTAL CA SCREEN DOC REV: CPT | Performed by: INTERNAL MEDICINE

## 2023-12-08 PROCEDURE — G8420 CALC BMI NORM PARAMETERS: HCPCS | Performed by: INTERNAL MEDICINE

## 2023-12-08 PROCEDURE — G8484 FLU IMMUNIZE NO ADMIN: HCPCS | Performed by: INTERNAL MEDICINE

## 2023-12-08 PROCEDURE — G8399 PT W/DXA RESULTS DOCUMENT: HCPCS | Performed by: INTERNAL MEDICINE

## 2023-12-08 PROCEDURE — 1036F TOBACCO NON-USER: CPT | Performed by: INTERNAL MEDICINE

## 2023-12-08 PROCEDURE — 1123F ACP DISCUSS/DSCN MKR DOCD: CPT | Performed by: INTERNAL MEDICINE

## 2023-12-08 PROCEDURE — 1090F PRES/ABSN URINE INCON ASSESS: CPT | Performed by: INTERNAL MEDICINE

## 2023-12-10 DIAGNOSIS — G47.9 SLEEP DISORDER, UNSPECIFIED: ICD-10-CM

## 2023-12-10 DIAGNOSIS — G25.81 RESTLESS LEGS SYNDROME: ICD-10-CM

## 2023-12-10 DIAGNOSIS — F33.8 OTHER RECURRENT DEPRESSIVE DISORDERS (HCC): ICD-10-CM

## 2023-12-11 RX ORDER — CLONAZEPAM 0.5 MG/1
TABLET ORAL
Qty: 45 TABLET | Refills: 0 | Status: SHIPPED | OUTPATIENT
Start: 2023-12-11 | End: 2024-01-11

## 2023-12-11 NOTE — TELEPHONE ENCOUNTER
Orders Placed This Encounter   Medications    clonazePAM (KLONOPIN) 0.5 MG tablet     Sig: TAKE 1 AND 1/2 TABLET BY MOUTH EVERY NIGHT AT BEDTIME AS NEEDED     Dispense:  45 tablet     Refill:  0        reviewed 12/11/2023

## 2023-12-15 ENCOUNTER — HOSPITAL ENCOUNTER (OUTPATIENT)
Age: 68
End: 2023-12-15
Payer: MEDICARE

## 2023-12-15 DIAGNOSIS — R22.1 NECK MASS: ICD-10-CM

## 2023-12-15 PROCEDURE — 76536 US EXAM OF HEAD AND NECK: CPT

## 2024-01-04 ENCOUNTER — OFFICE VISIT (OUTPATIENT)
Age: 69
End: 2024-01-04
Payer: MEDICARE

## 2024-01-04 VITALS
OXYGEN SATURATION: 98 % | TEMPERATURE: 97.8 F | HEART RATE: 73 BPM | RESPIRATION RATE: 18 BRPM | BODY MASS INDEX: 19.09 KG/M2 | SYSTOLIC BLOOD PRESSURE: 102 MMHG | DIASTOLIC BLOOD PRESSURE: 60 MMHG | HEIGHT: 64 IN | WEIGHT: 111.8 LBS

## 2024-01-04 DIAGNOSIS — R22.1 NECK MASS: Primary | ICD-10-CM

## 2024-01-04 DIAGNOSIS — R22.1 NECK MASS: ICD-10-CM

## 2024-01-04 LAB
ALBUMIN SERPL-MCNC: 3.9 G/DL (ref 3.5–5)
ALBUMIN/GLOB SERPL: 1.3 (ref 1.1–2.2)
ALP SERPL-CCNC: 110 U/L (ref 45–117)
ALT SERPL-CCNC: 26 U/L (ref 12–78)
ANION GAP SERPL CALC-SCNC: 4 MMOL/L (ref 5–15)
AST SERPL-CCNC: 22 U/L (ref 15–37)
BASOPHILS # BLD: 0.1 K/UL (ref 0–0.1)
BASOPHILS NFR BLD: 1 % (ref 0–1)
BILIRUB SERPL-MCNC: 0.4 MG/DL (ref 0.2–1)
BUN SERPL-MCNC: 24 MG/DL (ref 6–20)
BUN/CREAT SERPL: 38 (ref 12–20)
CALCIUM SERPL-MCNC: 9.3 MG/DL (ref 8.5–10.1)
CHLORIDE SERPL-SCNC: 104 MMOL/L (ref 97–108)
CO2 SERPL-SCNC: 29 MMOL/L (ref 21–32)
CREAT SERPL-MCNC: 0.64 MG/DL (ref 0.55–1.02)
DIFFERENTIAL METHOD BLD: ABNORMAL
EOSINOPHIL # BLD: 0.3 K/UL (ref 0–0.4)
EOSINOPHIL NFR BLD: 4 % (ref 0–7)
ERYTHROCYTE [DISTWIDTH] IN BLOOD BY AUTOMATED COUNT: 12.7 % (ref 11.5–14.5)
GLOBULIN SER CALC-MCNC: 3.1 G/DL (ref 2–4)
GLUCOSE SERPL-MCNC: 84 MG/DL (ref 65–100)
HCT VFR BLD AUTO: 34.3 % (ref 35–47)
HGB BLD-MCNC: 11.3 G/DL (ref 11.5–16)
IMM GRANULOCYTES # BLD AUTO: 0 K/UL (ref 0–0.04)
IMM GRANULOCYTES NFR BLD AUTO: 0 % (ref 0–0.5)
LYMPHOCYTES # BLD: 1.7 K/UL (ref 0.8–3.5)
LYMPHOCYTES NFR BLD: 22 % (ref 12–49)
MCH RBC QN AUTO: 31.4 PG (ref 26–34)
MCHC RBC AUTO-ENTMCNC: 32.9 G/DL (ref 30–36.5)
MCV RBC AUTO: 95.3 FL (ref 80–99)
MONOCYTES # BLD: 0.6 K/UL (ref 0–1)
MONOCYTES NFR BLD: 8 % (ref 5–13)
NEUTS SEG # BLD: 5.1 K/UL (ref 1.8–8)
NEUTS SEG NFR BLD: 65 % (ref 32–75)
NRBC # BLD: 0 K/UL (ref 0–0.01)
NRBC BLD-RTO: 0 PER 100 WBC
PLATELET # BLD AUTO: 218 K/UL (ref 150–400)
PMV BLD AUTO: 11.7 FL (ref 8.9–12.9)
POTASSIUM SERPL-SCNC: 4.2 MMOL/L (ref 3.5–5.1)
PROT SERPL-MCNC: 7 G/DL (ref 6.4–8.2)
RBC # BLD AUTO: 3.6 M/UL (ref 3.8–5.2)
SODIUM SERPL-SCNC: 137 MMOL/L (ref 136–145)
WBC # BLD AUTO: 7.8 K/UL (ref 3.6–11)

## 2024-01-04 PROCEDURE — G8427 DOCREV CUR MEDS BY ELIG CLIN: HCPCS | Performed by: INTERNAL MEDICINE

## 2024-01-04 PROCEDURE — 99213 OFFICE O/P EST LOW 20 MIN: CPT | Performed by: INTERNAL MEDICINE

## 2024-01-04 PROCEDURE — 1036F TOBACCO NON-USER: CPT | Performed by: INTERNAL MEDICINE

## 2024-01-04 PROCEDURE — 3017F COLORECTAL CA SCREEN DOC REV: CPT | Performed by: INTERNAL MEDICINE

## 2024-01-04 PROCEDURE — G8399 PT W/DXA RESULTS DOCUMENT: HCPCS | Performed by: INTERNAL MEDICINE

## 2024-01-04 PROCEDURE — 1090F PRES/ABSN URINE INCON ASSESS: CPT | Performed by: INTERNAL MEDICINE

## 2024-01-04 PROCEDURE — 1123F ACP DISCUSS/DSCN MKR DOCD: CPT | Performed by: INTERNAL MEDICINE

## 2024-01-04 PROCEDURE — G8420 CALC BMI NORM PARAMETERS: HCPCS | Performed by: INTERNAL MEDICINE

## 2024-01-04 PROCEDURE — G8484 FLU IMMUNIZE NO ADMIN: HCPCS | Performed by: INTERNAL MEDICINE

## 2024-01-04 ASSESSMENT — PATIENT HEALTH QUESTIONNAIRE - PHQ9
10. IF YOU CHECKED OFF ANY PROBLEMS, HOW DIFFICULT HAVE THESE PROBLEMS MADE IT FOR YOU TO DO YOUR WORK, TAKE CARE OF THINGS AT HOME, OR GET ALONG WITH OTHER PEOPLE: 1
SUM OF ALL RESPONSES TO PHQ QUESTIONS 1-9: 7
1. LITTLE INTEREST OR PLEASURE IN DOING THINGS: 1
7. TROUBLE CONCENTRATING ON THINGS, SUCH AS READING THE NEWSPAPER OR WATCHING TELEVISION: 1
5. POOR APPETITE OR OVEREATING: 0
9. THOUGHTS THAT YOU WOULD BE BETTER OFF DEAD, OR OF HURTING YOURSELF: 0
SUM OF ALL RESPONSES TO PHQ QUESTIONS 1-9: 7
2. FEELING DOWN, DEPRESSED OR HOPELESS: 1
SUM OF ALL RESPONSES TO PHQ QUESTIONS 1-9: 7
6. FEELING BAD ABOUT YOURSELF - OR THAT YOU ARE A FAILURE OR HAVE LET YOURSELF OR YOUR FAMILY DOWN: 0
SUM OF ALL RESPONSES TO PHQ QUESTIONS 1-9: 7
SUM OF ALL RESPONSES TO PHQ9 QUESTIONS 1 & 2: 2
3. TROUBLE FALLING OR STAYING ASLEEP: 2
4. FEELING TIRED OR HAVING LITTLE ENERGY: 2
8. MOVING OR SPEAKING SO SLOWLY THAT OTHER PEOPLE COULD HAVE NOTICED. OR THE OPPOSITE, BEING SO FIGETY OR RESTLESS THAT YOU HAVE BEEN MOVING AROUND A LOT MORE THAN USUAL: 0

## 2024-01-04 NOTE — PROGRESS NOTES
Assessment/Plan:     1. Neck mass  -now causing pain and impeding movement of her neck.  -ultrasound done on 12/8/2023 did not show any significant findings. With progressive pain , will order CT of neck.  Concerned for soft tissue mass.     - CT SOFT TISSUE NECK W CONTRAST; Future  - CBC with Auto Differential; Future  - Comprehensive Metabolic Panel; Future     Orders Placed This Encounter    CT SOFT TISSUE NECK W CONTRAST     Standing Status:   Future     Standing Expiration Date:   1/4/2025     Order Specific Question:   Additional Contrast?     Answer:   Radiologist Recommendation     Order Specific Question:   STAT Creatinine as needed:     Answer:   No    CBC with Auto Differential     Standing Status:   Future     Standing Expiration Date:   1/4/2025    Comprehensive Metabolic Panel     Standing Status:   Future     Standing Expiration Date:   1/4/2025              Disclaimer:  Return if symptoms worsen or fail to improve.   Advised patient to call back or return to office if symptoms worsen/change/persist.     Discussed expected course/resolution/complications of diagnosis in detail with patient.   Medication risks/benefits/costs/interactions/alternatives discussed with patient.   Patient was given an after visit summary which includes diagnoses, current medications, & vitals.   Patient expressed understanding with the diagnosis and plan.        Subjective:      Joelle Camara is a 68 y.o. female who presents today for neck mass      -mass noted on right side of cervical spine earlier in December, 2023.  Ultrasound of this area did not show any significant abnormality- US done on 12/8/2023.  Since this time, the mass is causing worsening pain and limiting the movement of her neck.  She has also noted yesterday that she had trouble swallowing her medication.  It felt like it was 'stuck in a pocket at the base of her neck'.     Objective:     /60   Pulse 73   Temp 97.8 °F (36.6 °C) (Oral)   Resp

## 2024-01-04 NOTE — PROGRESS NOTES
Chief Complaint   Patient presents with    Neck Pain     Lump       Blood pressure 102/60, pulse 73, temperature 97.8 °F (36.6 °C), temperature source Oral, resp. rate 18, height 1.626 m (5' 4\"), weight 50.7 kg (111 lb 12.8 oz), SpO2 98 %.

## 2024-01-18 ENCOUNTER — PATIENT MESSAGE (OUTPATIENT)
Age: 69
End: 2024-01-18

## 2024-01-18 RX ORDER — PRAMIPEXOLE DIHYDROCHLORIDE 0.75 MG/1
TABLET ORAL
Qty: 90 TABLET | Refills: 1 | Status: SHIPPED | OUTPATIENT
Start: 2024-01-18

## 2024-01-22 DIAGNOSIS — G47.9 SLEEP DISORDER, UNSPECIFIED: ICD-10-CM

## 2024-01-22 DIAGNOSIS — G25.81 RESTLESS LEGS SYNDROME: ICD-10-CM

## 2024-01-22 DIAGNOSIS — F33.8 OTHER RECURRENT DEPRESSIVE DISORDERS (HCC): ICD-10-CM

## 2024-01-23 RX ORDER — CLONAZEPAM 0.5 MG/1
TABLET ORAL
Qty: 45 TABLET | Refills: 0 | Status: SHIPPED | OUTPATIENT
Start: 2024-01-23 | End: 2024-02-23

## 2024-01-23 NOTE — TELEPHONE ENCOUNTER
Orders Placed This Encounter   Medications    clonazePAM (KLONOPIN) 0.5 MG tablet     Sig: TAKE 1 AND 1/2 TABLET BY MOUTH EVERY NIGHT AT BEDTIME AS NEEDED     Dispense:  45 tablet     Refill:  0        reviewed 1/23/2024

## 2024-01-29 SDOH — HEALTH STABILITY: PHYSICAL HEALTH: ON AVERAGE, HOW MANY MINUTES DO YOU ENGAGE IN EXERCISE AT THIS LEVEL?: 30 MIN

## 2024-01-29 SDOH — HEALTH STABILITY: PHYSICAL HEALTH: ON AVERAGE, HOW MANY DAYS PER WEEK DO YOU ENGAGE IN MODERATE TO STRENUOUS EXERCISE (LIKE A BRISK WALK)?: 4 DAYS

## 2024-01-29 ASSESSMENT — PATIENT HEALTH QUESTIONNAIRE - PHQ9
SUM OF ALL RESPONSES TO PHQ QUESTIONS 1-9: 2
1. LITTLE INTEREST OR PLEASURE IN DOING THINGS: 1
SUM OF ALL RESPONSES TO PHQ QUESTIONS 1-9: 2
SUM OF ALL RESPONSES TO PHQ9 QUESTIONS 1 & 2: 2
SUM OF ALL RESPONSES TO PHQ QUESTIONS 1-9: 2
SUM OF ALL RESPONSES TO PHQ QUESTIONS 1-9: 2
2. FEELING DOWN, DEPRESSED OR HOPELESS: 1

## 2024-01-29 ASSESSMENT — LIFESTYLE VARIABLES
HOW OFTEN DURING THE LAST YEAR HAVE YOU FOUND THAT YOU WERE NOT ABLE TO STOP DRINKING ONCE YOU HAD STARTED: 0
HOW MANY STANDARD DRINKS CONTAINING ALCOHOL DO YOU HAVE ON A TYPICAL DAY: 1
HOW OFTEN DURING THE LAST YEAR HAVE YOU NEEDED AN ALCOHOLIC DRINK FIRST THING IN THE MORNING TO GET YOURSELF GOING AFTER A NIGHT OF HEAVY DRINKING: NEVER
HOW OFTEN DO YOU HAVE A DRINK CONTAINING ALCOHOL: 5
HOW OFTEN DURING THE LAST YEAR HAVE YOU BEEN UNABLE TO REMEMBER WHAT HAPPENED THE NIGHT BEFORE BECAUSE YOU HAD BEEN DRINKING: 0
HAVE YOU OR SOMEONE ELSE BEEN INJURED AS A RESULT OF YOUR DRINKING: NO
HAVE YOU OR SOMEONE ELSE BEEN INJURED AS A RESULT OF YOUR DRINKING: 0
HOW OFTEN DURING THE LAST YEAR HAVE YOU FAILED TO DO WHAT WAS NORMALLY EXPECTED FROM YOU BECAUSE OF DRINKING: 0
HAS A RELATIVE, FRIEND, DOCTOR, OR ANOTHER HEALTH PROFESSIONAL EXPRESSED CONCERN ABOUT YOUR DRINKING OR SUGGESTED YOU CUT DOWN: NO
HOW OFTEN DURING THE LAST YEAR HAVE YOU BEEN UNABLE TO REMEMBER WHAT HAPPENED THE NIGHT BEFORE BECAUSE YOU HAD BEEN DRINKING: NEVER
HOW OFTEN DURING THE LAST YEAR HAVE YOU HAD A FEELING OF GUILT OR REMORSE AFTER DRINKING: 0
HOW OFTEN DURING THE LAST YEAR HAVE YOU NEEDED AN ALCOHOLIC DRINK FIRST THING IN THE MORNING TO GET YOURSELF GOING AFTER A NIGHT OF HEAVY DRINKING: 0
HOW OFTEN DURING THE LAST YEAR HAVE YOU FAILED TO DO WHAT WAS NORMALLY EXPECTED FROM YOU BECAUSE OF DRINKING: NEVER
HOW OFTEN DURING THE LAST YEAR HAVE YOU FOUND THAT YOU WERE NOT ABLE TO STOP DRINKING ONCE YOU HAD STARTED: NEVER
HOW OFTEN DO YOU HAVE A DRINK CONTAINING ALCOHOL: 4 OR MORE TIMES A WEEK
HOW OFTEN DURING THE LAST YEAR HAVE YOU HAD A FEELING OF GUILT OR REMORSE AFTER DRINKING: NEVER
HAS A RELATIVE, FRIEND, DOCTOR, OR ANOTHER HEALTH PROFESSIONAL EXPRESSED CONCERN ABOUT YOUR DRINKING OR SUGGESTED YOU CUT DOWN: 0
HOW OFTEN DO YOU HAVE SIX OR MORE DRINKS ON ONE OCCASION: 1
HOW MANY STANDARD DRINKS CONTAINING ALCOHOL DO YOU HAVE ON A TYPICAL DAY: 1 OR 2

## 2024-01-30 ENCOUNTER — OFFICE VISIT (OUTPATIENT)
Age: 69
End: 2024-01-30
Payer: MEDICARE

## 2024-01-30 VITALS
WEIGHT: 107.6 LBS | OXYGEN SATURATION: 98 % | HEIGHT: 64 IN | BODY MASS INDEX: 18.37 KG/M2 | DIASTOLIC BLOOD PRESSURE: 69 MMHG | RESPIRATION RATE: 18 BRPM | HEART RATE: 72 BPM | TEMPERATURE: 97.1 F | SYSTOLIC BLOOD PRESSURE: 116 MMHG

## 2024-01-30 DIAGNOSIS — Z00.00 MEDICARE ANNUAL WELLNESS VISIT, SUBSEQUENT: Primary | ICD-10-CM

## 2024-01-30 DIAGNOSIS — M81.0 AGE-RELATED OSTEOPOROSIS WITHOUT CURRENT PATHOLOGICAL FRACTURE: ICD-10-CM

## 2024-01-30 DIAGNOSIS — F33.8 SEASONAL AFFECTIVE DISORDER (HCC): ICD-10-CM

## 2024-01-30 DIAGNOSIS — F33.8 OTHER RECURRENT DEPRESSIVE DISORDERS (HCC): ICD-10-CM

## 2024-01-30 DIAGNOSIS — G25.81 RESTLESS LEGS SYNDROME: ICD-10-CM

## 2024-01-30 DIAGNOSIS — G47.09 OTHER INSOMNIA: ICD-10-CM

## 2024-01-30 PROCEDURE — G8419 CALC BMI OUT NRM PARAM NOF/U: HCPCS | Performed by: INTERNAL MEDICINE

## 2024-01-30 PROCEDURE — G8484 FLU IMMUNIZE NO ADMIN: HCPCS | Performed by: INTERNAL MEDICINE

## 2024-01-30 PROCEDURE — 99213 OFFICE O/P EST LOW 20 MIN: CPT | Performed by: INTERNAL MEDICINE

## 2024-01-30 PROCEDURE — 1036F TOBACCO NON-USER: CPT | Performed by: INTERNAL MEDICINE

## 2024-01-30 PROCEDURE — 1090F PRES/ABSN URINE INCON ASSESS: CPT | Performed by: INTERNAL MEDICINE

## 2024-01-30 PROCEDURE — G8427 DOCREV CUR MEDS BY ELIG CLIN: HCPCS | Performed by: INTERNAL MEDICINE

## 2024-01-30 PROCEDURE — G8399 PT W/DXA RESULTS DOCUMENT: HCPCS | Performed by: INTERNAL MEDICINE

## 2024-01-30 PROCEDURE — G0439 PPPS, SUBSEQ VISIT: HCPCS | Performed by: INTERNAL MEDICINE

## 2024-01-30 PROCEDURE — 3017F COLORECTAL CA SCREEN DOC REV: CPT | Performed by: INTERNAL MEDICINE

## 2024-01-30 PROCEDURE — 1123F ACP DISCUSS/DSCN MKR DOCD: CPT | Performed by: INTERNAL MEDICINE

## 2024-01-30 ASSESSMENT — PATIENT HEALTH QUESTIONNAIRE - PHQ9
SUM OF ALL RESPONSES TO PHQ QUESTIONS 1-9: 6
10. IF YOU CHECKED OFF ANY PROBLEMS, HOW DIFFICULT HAVE THESE PROBLEMS MADE IT FOR YOU TO DO YOUR WORK, TAKE CARE OF THINGS AT HOME, OR GET ALONG WITH OTHER PEOPLE: 0
4. FEELING TIRED OR HAVING LITTLE ENERGY: 1
9. THOUGHTS THAT YOU WOULD BE BETTER OFF DEAD, OR OF HURTING YOURSELF: 0
7. TROUBLE CONCENTRATING ON THINGS, SUCH AS READING THE NEWSPAPER OR WATCHING TELEVISION: 1
8. MOVING OR SPEAKING SO SLOWLY THAT OTHER PEOPLE COULD HAVE NOTICED. OR THE OPPOSITE, BEING SO FIGETY OR RESTLESS THAT YOU HAVE BEEN MOVING AROUND A LOT MORE THAN USUAL: 0
SUM OF ALL RESPONSES TO PHQ QUESTIONS 1-9: 6
SUM OF ALL RESPONSES TO PHQ QUESTIONS 1-9: 6
3. TROUBLE FALLING OR STAYING ASLEEP: 2
SUM OF ALL RESPONSES TO PHQ9 QUESTIONS 1 & 2: 2
SUM OF ALL RESPONSES TO PHQ QUESTIONS 1-9: 6
1. LITTLE INTEREST OR PLEASURE IN DOING THINGS: 1
2. FEELING DOWN, DEPRESSED OR HOPELESS: 1
5. POOR APPETITE OR OVEREATING: 0
6. FEELING BAD ABOUT YOURSELF - OR THAT YOU ARE A FAILURE OR HAVE LET YOURSELF OR YOUR FAMILY DOWN: 0

## 2024-01-30 NOTE — PATIENT INSTRUCTIONS
experience to learn the triggers that lead you to drink or use drugs. Then quit again. Recovery is a lifelong process. Many people have several relapses before they are able to quit for good.  Follow-up care is a key part of your treatment and safety. Be sure to make and go to all appointments, and call your doctor if you are having problems. It's also a good idea to know your test results and keep a list of the medicines you take.  When should you call for help?   Call 911  anytime you think you may need emergency care. For example, call if you or someone else:    Has overdosed or has withdrawal signs. Be sure to tell the emergency workers that you are or someone else is using or trying to quit using drugs. Overdose or withdrawal signs may include:  Losing consciousness.  Seizure.  Seeing or hearing things that aren't there (hallucinations).     Is thinking or talking about suicide or harming others.   Where to get help 24 hours a day, 7 days a week   If you or someone you know talks about suicide, self-harm, a mental health crisis, a substance use crisis, or any other kind of emotional distress, get help right away. You can:    Call the Suicide and Crisis Lifeline at 988.     Call 5-247-825-TALK (1-594.365.9282).     Text HOME to 923032 to access the Crisis Text Line.   Consider saving these numbers in your phone.  Go to Joust.idemama for more information or to chat online.  Call your doctor now or seek immediate medical care if:    You are having withdrawal symptoms. These may include nausea or vomiting, sweating, shakiness, and anxiety.   Watch closely for changes in your health, and be sure to contact your doctor if:    You have a relapse.     You need more help or support to stop.   Where can you learn more?  Go to https://www.healthwise.net/patientEd and enter H573 to learn more about \"Substance Use Disorder: Care Instructions.\"  Current as of: March 21, 2023               Content Version: 13.9  © 9229-2882

## 2024-01-30 NOTE — PROGRESS NOTES
Chief Complaint   Patient presents with    Medicare AWV     Blood pressure 116/69, pulse 72, temperature 97.1 °F (36.2 °C), temperature source Temporal, resp. rate 18, height 1.628 m (5' 4.1\"), weight 48.8 kg (107 lb 9.6 oz), SpO2 98 %.    
difficulty driving, watching TV, or doing any of your daily activities because of your eyesight?: (!) Yes  Have you had an eye exam within the past year?: Appointment is scheduled  No results found.    Interventions:   Patient encouraged to make appointment with their eye specialist    Safety:  Do you have any tripping hazards - loose or unsecured carpets or rugs?: (!) Yes  Interventions:  Patient declined any further interventions or treatment      Tobacco Use:  Tobacco Use: Low Risk  (1/30/2024)    Patient History     Smoking Tobacco Use: Never     Smokeless Tobacco Use: Never     Passive Exposure: Never     E-cigarette/Vaping       Questions Responses    E-cigarette/Vaping Use Current Some Day User    Start Date     Passive Exposure No    Quit Date     Counseling Given No    Comments           Interventions:  Patient declined any further intervention or treatment                      Objective   Vitals:    01/30/24 0913   BP: 116/69   Pulse: 72   Resp: 18   Temp: 97.1 °F (36.2 °C)   TempSrc: Temporal   SpO2: 98%   Weight: 48.8 kg (107 lb 9.6 oz)   Height: 1.628 m (5' 4.1\")      Body mass index is 18.41 kg/m².        Physical Exam  Vitals and nursing note reviewed.   Constitutional:       Appearance: Normal appearance.   HENT:      Head: Normocephalic and atraumatic.      Right Ear: Tympanic membrane, ear canal and external ear normal.      Left Ear: Tympanic membrane, ear canal and external ear normal.      Mouth/Throat:      Mouth: Mucous membranes are moist.      Pharynx: Oropharynx is clear.   Eyes:      Extraocular Movements: Extraocular movements intact.      Conjunctiva/sclera: Conjunctivae normal.      Pupils: Pupils are equal, round, and reactive to light.   Cardiovascular:      Rate and Rhythm: Normal rate and regular rhythm.      Heart sounds: No murmur heard.     No friction rub. No gallop.   Pulmonary:      Effort: Pulmonary effort is normal.      Breath sounds: Normal breath sounds. No wheezing,

## 2024-03-07 DIAGNOSIS — G47.9 SLEEP DISORDER, UNSPECIFIED: ICD-10-CM

## 2024-03-07 DIAGNOSIS — F33.8 OTHER RECURRENT DEPRESSIVE DISORDERS (HCC): ICD-10-CM

## 2024-03-07 DIAGNOSIS — G25.81 RESTLESS LEGS SYNDROME: ICD-10-CM

## 2024-03-07 NOTE — TELEPHONE ENCOUNTER
Pt last seen on 01/30/2024    Has appt on 07/29/2024    Rx last filled on 01/23/2024 # 45 Forward to MD for refill.

## 2024-03-08 ENCOUNTER — PATIENT MESSAGE (OUTPATIENT)
Age: 69
End: 2024-03-08

## 2024-03-08 DIAGNOSIS — F33.8 OTHER RECURRENT DEPRESSIVE DISORDERS (HCC): ICD-10-CM

## 2024-03-08 DIAGNOSIS — G25.81 RESTLESS LEGS SYNDROME: ICD-10-CM

## 2024-03-08 DIAGNOSIS — G47.9 SLEEP DISORDER, UNSPECIFIED: ICD-10-CM

## 2024-03-08 RX ORDER — CITALOPRAM HYDROBROMIDE 10 MG/1
10 TABLET ORAL DAILY
Qty: 60 TABLET | Refills: 0 | Status: SHIPPED | OUTPATIENT
Start: 2024-03-08 | End: 2024-03-09 | Stop reason: DRUGHIGH

## 2024-03-08 RX ORDER — CLONAZEPAM 0.5 MG/1
0.75 TABLET ORAL NIGHTLY PRN
Qty: 45 TABLET | Refills: 0 | Status: SHIPPED | OUTPATIENT
Start: 2024-03-08 | End: 2024-04-07

## 2024-03-08 RX ORDER — PRAMIPEXOLE DIHYDROCHLORIDE 0.75 MG/1
TABLET ORAL
Qty: 90 TABLET | Refills: 0 | Status: SHIPPED | OUTPATIENT
Start: 2024-03-08

## 2024-03-08 RX ORDER — CLONAZEPAM 0.5 MG/1
TABLET ORAL
Qty: 45 TABLET | Refills: 0 | OUTPATIENT
Start: 2024-03-08

## 2024-03-09 RX ORDER — CITALOPRAM 20 MG/1
20 TABLET ORAL DAILY
Qty: 90 TABLET | Refills: 1 | Status: SHIPPED | OUTPATIENT
Start: 2024-03-09

## 2024-04-01 ENCOUNTER — TRANSCRIBE ORDERS (OUTPATIENT)
Facility: HOSPITAL | Age: 69
End: 2024-04-01

## 2024-04-01 DIAGNOSIS — Z12.31 VISIT FOR SCREENING MAMMOGRAM: Primary | ICD-10-CM

## 2024-04-01 RX ORDER — PRAMIPEXOLE DIHYDROCHLORIDE 0.75 MG/1
TABLET ORAL
Qty: 90 TABLET | Refills: 0 | Status: SHIPPED | OUTPATIENT
Start: 2024-04-01

## 2024-04-01 NOTE — TELEPHONE ENCOUNTER
Rx sent to pharmacy as previously filled and verified by Verbal Order Read Back with provider.   NV 07-

## 2024-04-05 ENCOUNTER — HOSPITAL ENCOUNTER (OUTPATIENT)
Age: 69
End: 2024-04-05
Payer: MEDICARE

## 2024-04-05 VITALS — HEIGHT: 64 IN | BODY MASS INDEX: 18.44 KG/M2 | WEIGHT: 108 LBS

## 2024-04-05 DIAGNOSIS — Z12.31 VISIT FOR SCREENING MAMMOGRAM: ICD-10-CM

## 2024-04-05 PROCEDURE — 77063 BREAST TOMOSYNTHESIS BI: CPT

## 2024-04-18 DIAGNOSIS — G25.81 RESTLESS LEGS SYNDROME: ICD-10-CM

## 2024-04-18 DIAGNOSIS — G47.9 SLEEP DISORDER, UNSPECIFIED: ICD-10-CM

## 2024-04-18 DIAGNOSIS — F33.8 OTHER RECURRENT DEPRESSIVE DISORDERS (HCC): ICD-10-CM

## 2024-04-18 RX ORDER — CLONAZEPAM 0.5 MG/1
0.75 TABLET ORAL NIGHTLY PRN
Qty: 45 TABLET | Refills: 0 | Status: SHIPPED | OUTPATIENT
Start: 2024-04-18 | End: 2024-05-18

## 2024-04-18 NOTE — TELEPHONE ENCOUNTER
Orders Placed This Encounter   Medications    clonazePAM (KLONOPIN) 0.5 MG tablet     Sig: Take 1.5 tablets by mouth nightly as needed for Anxiety for up to 30 days. Max Daily Amount: 0.75 mg     Dispense:  45 tablet     Refill:  0        reviewed 4/18/2024

## 2024-04-20 DIAGNOSIS — F33.8 OTHER RECURRENT DEPRESSIVE DISORDERS (HCC): ICD-10-CM

## 2024-04-20 DIAGNOSIS — G47.9 SLEEP DISORDER, UNSPECIFIED: ICD-10-CM

## 2024-04-20 DIAGNOSIS — G25.81 RESTLESS LEGS SYNDROME: ICD-10-CM

## 2024-04-23 RX ORDER — CLONAZEPAM 0.5 MG/1
TABLET ORAL
Qty: 45 TABLET | Refills: 0 | OUTPATIENT
Start: 2024-04-23

## 2024-05-17 ENCOUNTER — OFFICE VISIT (OUTPATIENT)
Age: 69
End: 2024-05-17
Payer: MEDICARE

## 2024-05-17 VITALS
WEIGHT: 108 LBS | HEART RATE: 80 BPM | OXYGEN SATURATION: 100 % | DIASTOLIC BLOOD PRESSURE: 67 MMHG | HEIGHT: 64 IN | BODY MASS INDEX: 18.44 KG/M2 | SYSTOLIC BLOOD PRESSURE: 103 MMHG | TEMPERATURE: 97.5 F | RESPIRATION RATE: 16 BRPM
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
## 2024-05-17 DIAGNOSIS — S70.361A INSECT BITE OF RIGHT THIGH, INITIAL ENCOUNTER: Primary | ICD-10-CM

## 2024-05-17 PROCEDURE — 99212 OFFICE O/P EST SF 10 MIN: CPT | Performed by: INTERNAL MEDICINE

## 2024-05-17 PROCEDURE — G8399 PT W/DXA RESULTS DOCUMENT: HCPCS | Performed by: INTERNAL MEDICINE

## 2024-05-17 PROCEDURE — 3017F COLORECTAL CA SCREEN DOC REV: CPT | Performed by: INTERNAL MEDICINE

## 2024-05-17 PROCEDURE — G8420 CALC BMI NORM PARAMETERS: HCPCS | Performed by: INTERNAL MEDICINE

## 2024-05-17 PROCEDURE — 1090F PRES/ABSN URINE INCON ASSESS: CPT | Performed by: INTERNAL MEDICINE

## 2024-05-17 PROCEDURE — 1123F ACP DISCUSS/DSCN MKR DOCD: CPT | Performed by: INTERNAL MEDICINE

## 2024-05-17 PROCEDURE — G8427 DOCREV CUR MEDS BY ELIG CLIN: HCPCS | Performed by: INTERNAL MEDICINE

## 2024-05-17 PROCEDURE — 1036F TOBACCO NON-USER: CPT | Performed by: INTERNAL MEDICINE

## 2024-05-17 ASSESSMENT — PATIENT HEALTH QUESTIONNAIRE - PHQ9
1. LITTLE INTEREST OR PLEASURE IN DOING THINGS: NOT AT ALL
2. FEELING DOWN, DEPRESSED OR HOPELESS: NOT AT ALL
SUM OF ALL RESPONSES TO PHQ QUESTIONS 1-9: 0
SUM OF ALL RESPONSES TO PHQ QUESTIONS 1-9: 0
SUM OF ALL RESPONSES TO PHQ9 QUESTIONS 1 & 2: 0
SUM OF ALL RESPONSES TO PHQ QUESTIONS 1-9: 0
SUM OF ALL RESPONSES TO PHQ QUESTIONS 1-9: 0

## 2024-05-17 NOTE — PROGRESS NOTES
Joelle Camara is a 69 y.o. female who was seen in clinic today (5/17/2024) for an acute visit.      Assessment & Plan:   Below is the assessment and plan developed based on review of pertinent history, physical exam, labs, studies, and medications.    1. Insect bite of right thigh, initial encounter  Comments:  new dx, reassurred nothing to suggest tick bite or infection. Would recommend no treatment and to monitor it.  Red flags & expectations reviewed.     Return if symptoms worsen or fail to improve.   Subjective/Objective:   Joelle was seen today for Insect Bite    Dermatology Review  She is here to talk about bug bites that occurred a week ago.  She was working in her yard.  All other bites resolves except for one that is still present.  It is located on the R posterior thigh.  She can't see it but feels like there might be something there.  Symptoms include nothing.  Treatment to date has included nothing.  She is concerned about a tick.      Prior to Admission medications    Medication Sig Start Date End Date Taking? Authorizing Provider   clonazePAM (KLONOPIN) 0.5 MG tablet Take 1.5 tablets by mouth nightly as needed for Anxiety for up to 30 days. Max Daily Amount: 0.75 mg 4/18/24 5/18/24 Yes Maribeth Solitario MD   pramipexole (MIRAPEX) 0.75 MG tablet 1 tablet nightly 4/1/24  Yes Maribeth Solitario MD   citalopram (CELEXA) 20 MG tablet Take 1 tablet by mouth daily 3/9/24  Yes Maribeth Solitario MD   Estradiol (VAGIFEM) 10 MCG TABS vaginal tablet Place 1 tablet vaginally PT TAKES EVERY 5 DAYS 12/18/17  Yes Automatic Reconciliation, Ar   estradiol 0.025 MG/24HR PTTW Apply 1 patch topically once a week PT TAKES EVERY 5 DAYS 5/12/17  Yes Automatic Reconciliation, Ar   progesterone (PROMETRIUM) 100 MG CAPS capsule Take 1 capsule by mouth 5/11/17  Yes Automatic Reconciliation, Ar   valACYclovir (VALTREX) 500 MG tablet Take 1 tablet by mouth as needed 5/13/16  Yes Automatic Reconciliation, Ar        Review of Systems- per HPI

## 2024-06-01 DIAGNOSIS — G47.9 SLEEP DISORDER, UNSPECIFIED: ICD-10-CM

## 2024-06-01 DIAGNOSIS — G25.81 RESTLESS LEGS SYNDROME: ICD-10-CM

## 2024-06-01 DIAGNOSIS — F33.8 OTHER RECURRENT DEPRESSIVE DISORDERS (HCC): ICD-10-CM

## 2024-06-03 DIAGNOSIS — F33.8 OTHER RECURRENT DEPRESSIVE DISORDERS (HCC): ICD-10-CM

## 2024-06-03 DIAGNOSIS — G47.9 SLEEP DISORDER, UNSPECIFIED: ICD-10-CM

## 2024-06-03 DIAGNOSIS — G25.81 RESTLESS LEGS SYNDROME: ICD-10-CM

## 2024-06-03 RX ORDER — CLONAZEPAM 0.5 MG/1
0.75 TABLET ORAL NIGHTLY PRN
Qty: 45 TABLET | Refills: 0 | Status: SHIPPED | OUTPATIENT
Start: 2024-06-03 | End: 2024-07-03

## 2024-06-03 RX ORDER — CLONAZEPAM 0.5 MG/1
TABLET ORAL
Qty: 45 TABLET | Refills: 0 | OUTPATIENT
Start: 2024-06-03

## 2024-06-03 NOTE — TELEPHONE ENCOUNTER
Orders Placed This Encounter   Medications    clonazePAM (KLONOPIN) 0.5 MG tablet     Sig: Take 1.5 tablets by mouth nightly as needed for Anxiety for up to 30 days. Max Daily Amount: 0.75 mg     Dispense:  45 tablet     Refill:  0        reviewed 6/3/2024

## 2024-07-09 DIAGNOSIS — G47.9 SLEEP DISORDER, UNSPECIFIED: ICD-10-CM

## 2024-07-09 DIAGNOSIS — F33.8 OTHER RECURRENT DEPRESSIVE DISORDERS (HCC): ICD-10-CM

## 2024-07-09 DIAGNOSIS — G25.81 RESTLESS LEGS SYNDROME: ICD-10-CM

## 2024-07-09 RX ORDER — CLONAZEPAM 0.5 MG/1
TABLET ORAL
Qty: 45 TABLET | Refills: 0 | Status: SHIPPED | OUTPATIENT
Start: 2024-07-09 | End: 2024-08-08

## 2024-07-09 NOTE — TELEPHONE ENCOUNTER
Orders Placed This Encounter   Medications    clonazePAM (KLONOPIN) 0.5 MG tablet     Sig: TAKE 1 AND 1/2 TABLET BY MOUTH NIGHTLY AS NEEDED FOR ANXIETY FOR UP TO 30 DAYS..MAX DAILY AMOUNT 0.75MG     Dispense:  45 tablet     Refill:  0        reviewed 7/9/2024

## 2024-07-28 NOTE — PROGRESS NOTES
Joelle Camara is a 69 y.o. female Established patient who presents today for evaluation of the following          Assessment & Plan  1. Anxiety.  She has been taking Celexa 10 mg daily since June 18, 2024, for stress and crying spells. Despite a previous recommendation to increase the dose to 20 mg, she has continued with 10 mg. Her mood is controlled and stable.  She will remain on this dose through spring of 2025. A prescription for Celexa 10 mg, 1 tablet daily, will be provided with refills at [facility].     2. Left big toe pain  The tenderness around her distal toe is likely arthritic in nature, associated with her bunion. She is advised to wear supportive shoes, avoid going barefoot, and use ibuprofen as needed for pain relief.    3. Restless leg  She is currently taking Mirapex 1 tablet at bedtime and clonazepam at bedtime. She is also taking a curcumin and turmeric compound prescribed by her acupuncturist.    4. hyperlipidemia   -diet controlled  Fasting blood work, including liver and kidney function tests, cholesterol panel, and urinalysis, will be ordered.    Orders Placed This Encounter    CBC with Auto Differential     Standing Status:   Future     Number of Occurrences:   1     Standing Expiration Date:   7/29/2025    Comprehensive Metabolic Panel     Standing Status:   Future     Number of Occurrences:   1     Standing Expiration Date:   7/29/2025    Lipid Panel     Standing Status:   Future     Number of Occurrences:   1     Standing Expiration Date:   7/29/2025     Order Specific Question:   Is Patient Fasting?/# of Hours     Answer:   8     Order Specific Question:   Has the patient fasted?     Answer:   Yes    Urinalysis with Reflex to Culture     Standing Status:   Future     Number of Occurrences:   1     Standing Expiration Date:   7/29/2025     Order Specific Question:   SPECIFY(EX-CATH,MIDSTREAM,CYSTO,ETC)?     Answer:   midstream                      citalopram (CELEXA) 10 MG

## 2024-07-29 ENCOUNTER — OFFICE VISIT (OUTPATIENT)
Age: 69
End: 2024-07-29
Payer: MEDICARE

## 2024-07-29 VITALS
SYSTOLIC BLOOD PRESSURE: 105 MMHG | WEIGHT: 103 LBS | BODY MASS INDEX: 17.58 KG/M2 | OXYGEN SATURATION: 97 % | HEIGHT: 64 IN | HEART RATE: 89 BPM | DIASTOLIC BLOOD PRESSURE: 67 MMHG | TEMPERATURE: 98 F

## 2024-07-29 DIAGNOSIS — F32.A DEPRESSION, UNSPECIFIED DEPRESSION TYPE: ICD-10-CM

## 2024-07-29 DIAGNOSIS — E78.2 MIXED HYPERLIPIDEMIA: ICD-10-CM

## 2024-07-29 DIAGNOSIS — G25.81 RESTLESS LEGS SYNDROME: ICD-10-CM

## 2024-07-29 DIAGNOSIS — Z79.899 ENCOUNTER FOR LONG-TERM (CURRENT) USE OF MEDICATIONS: ICD-10-CM

## 2024-07-29 DIAGNOSIS — F32.A DEPRESSION, UNSPECIFIED DEPRESSION TYPE: Primary | ICD-10-CM

## 2024-07-29 LAB
ALBUMIN SERPL-MCNC: 4.2 G/DL (ref 3.5–5)
ALBUMIN/GLOB SERPL: 1.3 (ref 1.1–2.2)
ALP SERPL-CCNC: 89 U/L (ref 45–117)
ALT SERPL-CCNC: 25 U/L (ref 12–78)
ANION GAP SERPL CALC-SCNC: 4 MMOL/L (ref 5–15)
APPEARANCE UR: CLEAR
AST SERPL-CCNC: 25 U/L (ref 15–37)
BACTERIA URNS QL MICRO: NEGATIVE /HPF
BASOPHILS # BLD: 0.1 K/UL (ref 0–0.1)
BASOPHILS NFR BLD: 1 % (ref 0–1)
BILIRUB SERPL-MCNC: 0.4 MG/DL (ref 0.2–1)
BILIRUB UR QL: NEGATIVE
BUN SERPL-MCNC: 15 MG/DL (ref 6–20)
BUN/CREAT SERPL: 21 (ref 12–20)
CALCIUM SERPL-MCNC: 9.7 MG/DL (ref 8.5–10.1)
CHLORIDE SERPL-SCNC: 102 MMOL/L (ref 97–108)
CHOLEST SERPL-MCNC: 216 MG/DL
CO2 SERPL-SCNC: 30 MMOL/L (ref 21–32)
COLOR UR: NORMAL
CREAT SERPL-MCNC: 0.71 MG/DL (ref 0.55–1.02)
DIFFERENTIAL METHOD BLD: NORMAL
EOSINOPHIL # BLD: 0.2 K/UL (ref 0–0.4)
EOSINOPHIL NFR BLD: 3 % (ref 0–7)
EPITH CASTS URNS QL MICRO: NORMAL /LPF
ERYTHROCYTE [DISTWIDTH] IN BLOOD BY AUTOMATED COUNT: 12.5 % (ref 11.5–14.5)
GLOBULIN SER CALC-MCNC: 3.3 G/DL (ref 2–4)
GLUCOSE SERPL-MCNC: 90 MG/DL (ref 65–100)
GLUCOSE UR STRIP.AUTO-MCNC: NEGATIVE MG/DL
HCT VFR BLD AUTO: 37 % (ref 35–47)
HDLC SERPL-MCNC: 104 MG/DL
HDLC SERPL: 2.1 (ref 0–5)
HGB BLD-MCNC: 12.4 G/DL (ref 11.5–16)
HGB UR QL STRIP: NEGATIVE
HYALINE CASTS URNS QL MICRO: NORMAL /LPF (ref 0–5)
IMM GRANULOCYTES # BLD AUTO: 0 K/UL (ref 0–0.04)
IMM GRANULOCYTES NFR BLD AUTO: 0 % (ref 0–0.5)
KETONES UR QL STRIP.AUTO: NEGATIVE MG/DL
LDLC SERPL CALC-MCNC: 104 MG/DL (ref 0–100)
LEUKOCYTE ESTERASE UR QL STRIP.AUTO: NEGATIVE
LYMPHOCYTES # BLD: 1.7 K/UL (ref 0.8–3.5)
LYMPHOCYTES NFR BLD: 31 % (ref 12–49)
MCH RBC QN AUTO: 31.5 PG (ref 26–34)
MCHC RBC AUTO-ENTMCNC: 33.5 G/DL (ref 30–36.5)
MCV RBC AUTO: 93.9 FL (ref 80–99)
MONOCYTES # BLD: 0.5 K/UL (ref 0–1)
MONOCYTES NFR BLD: 9 % (ref 5–13)
NEUTS SEG # BLD: 3.1 K/UL (ref 1.8–8)
NEUTS SEG NFR BLD: 56 % (ref 32–75)
NITRITE UR QL STRIP.AUTO: NEGATIVE
NRBC # BLD: 0 K/UL (ref 0–0.01)
NRBC BLD-RTO: 0 PER 100 WBC
PH UR STRIP: 6 (ref 5–8)
PLATELET # BLD AUTO: 220 K/UL (ref 150–400)
PMV BLD AUTO: 11.6 FL (ref 8.9–12.9)
POTASSIUM SERPL-SCNC: 4.1 MMOL/L (ref 3.5–5.1)
PROT SERPL-MCNC: 7.5 G/DL (ref 6.4–8.2)
PROT UR STRIP-MCNC: NEGATIVE MG/DL
RBC # BLD AUTO: 3.94 M/UL (ref 3.8–5.2)
RBC #/AREA URNS HPF: NORMAL /HPF (ref 0–5)
SODIUM SERPL-SCNC: 136 MMOL/L (ref 136–145)
SP GR UR REFRACTOMETRY: 1.01 (ref 1–1.03)
TRIGL SERPL-MCNC: 40 MG/DL
URINE CULTURE IF INDICATED: NORMAL
UROBILINOGEN UR QL STRIP.AUTO: 0.2 EU/DL (ref 0.2–1)
VLDLC SERPL CALC-MCNC: 8 MG/DL
WBC # BLD AUTO: 5.6 K/UL (ref 3.6–11)
WBC URNS QL MICRO: NORMAL /HPF (ref 0–4)

## 2024-07-29 PROCEDURE — 99214 OFFICE O/P EST MOD 30 MIN: CPT | Performed by: INTERNAL MEDICINE

## 2024-07-29 PROCEDURE — G8419 CALC BMI OUT NRM PARAM NOF/U: HCPCS | Performed by: INTERNAL MEDICINE

## 2024-07-29 PROCEDURE — 1036F TOBACCO NON-USER: CPT | Performed by: INTERNAL MEDICINE

## 2024-07-29 PROCEDURE — 3017F COLORECTAL CA SCREEN DOC REV: CPT | Performed by: INTERNAL MEDICINE

## 2024-07-29 PROCEDURE — 1090F PRES/ABSN URINE INCON ASSESS: CPT | Performed by: INTERNAL MEDICINE

## 2024-07-29 PROCEDURE — 1123F ACP DISCUSS/DSCN MKR DOCD: CPT | Performed by: INTERNAL MEDICINE

## 2024-07-29 PROCEDURE — G8399 PT W/DXA RESULTS DOCUMENT: HCPCS | Performed by: INTERNAL MEDICINE

## 2024-07-29 PROCEDURE — G8427 DOCREV CUR MEDS BY ELIG CLIN: HCPCS | Performed by: INTERNAL MEDICINE

## 2024-07-29 RX ORDER — AMOXICILLIN 875 MG/1
875 TABLET, COATED ORAL 2 TIMES DAILY
COMMUNITY
Start: 2024-07-17

## 2024-07-29 RX ORDER — CITALOPRAM HYDROBROMIDE 10 MG/1
10 TABLET ORAL DAILY
Qty: 90 TABLET | Refills: 1 | Status: SHIPPED | OUTPATIENT
Start: 2024-07-29

## 2024-07-29 RX ORDER — CITALOPRAM HYDROBROMIDE 10 MG/1
10 TABLET ORAL DAILY
COMMUNITY
End: 2024-07-29 | Stop reason: SDUPTHER

## 2024-07-29 SDOH — ECONOMIC STABILITY: INCOME INSECURITY: HOW HARD IS IT FOR YOU TO PAY FOR THE VERY BASICS LIKE FOOD, HOUSING, MEDICAL CARE, AND HEATING?: NOT HARD AT ALL

## 2024-07-29 SDOH — ECONOMIC STABILITY: FOOD INSECURITY: WITHIN THE PAST 12 MONTHS, THE FOOD YOU BOUGHT JUST DIDN'T LAST AND YOU DIDN'T HAVE MONEY TO GET MORE.: NEVER TRUE

## 2024-07-29 SDOH — ECONOMIC STABILITY: FOOD INSECURITY: WITHIN THE PAST 12 MONTHS, YOU WORRIED THAT YOUR FOOD WOULD RUN OUT BEFORE YOU GOT MONEY TO BUY MORE.: NEVER TRUE

## 2024-07-29 ASSESSMENT — PATIENT HEALTH QUESTIONNAIRE - PHQ9
1. LITTLE INTEREST OR PLEASURE IN DOING THINGS: NOT AT ALL
6. FEELING BAD ABOUT YOURSELF - OR THAT YOU ARE A FAILURE OR HAVE LET YOURSELF OR YOUR FAMILY DOWN: NOT AT ALL
10. IF YOU CHECKED OFF ANY PROBLEMS, HOW DIFFICULT HAVE THESE PROBLEMS MADE IT FOR YOU TO DO YOUR WORK, TAKE CARE OF THINGS AT HOME, OR GET ALONG WITH OTHER PEOPLE: NOT DIFFICULT AT ALL
7. TROUBLE CONCENTRATING ON THINGS, SUCH AS READING THE NEWSPAPER OR WATCHING TELEVISION: NOT AT ALL
SUM OF ALL RESPONSES TO PHQ QUESTIONS 1-9: 2
SUM OF ALL RESPONSES TO PHQ QUESTIONS 1-9: 2
5. POOR APPETITE OR OVEREATING: NOT AT ALL
SUM OF ALL RESPONSES TO PHQ9 QUESTIONS 1 & 2: 0
SUM OF ALL RESPONSES TO PHQ QUESTIONS 1-9: 2
4. FEELING TIRED OR HAVING LITTLE ENERGY: NOT AT ALL
3. TROUBLE FALLING OR STAYING ASLEEP: MORE THAN HALF THE DAYS
SUM OF ALL RESPONSES TO PHQ QUESTIONS 1-9: 2
2. FEELING DOWN, DEPRESSED OR HOPELESS: NOT AT ALL
9. THOUGHTS THAT YOU WOULD BE BETTER OFF DEAD, OR OF HURTING YOURSELF: NOT AT ALL
8. MOVING OR SPEAKING SO SLOWLY THAT OTHER PEOPLE COULD HAVE NOTICED. OR THE OPPOSITE, BEING SO FIGETY OR RESTLESS THAT YOU HAVE BEEN MOVING AROUND A LOT MORE THAN USUAL: NOT AT ALL

## 2024-07-29 NOTE — PROGRESS NOTES
Patient identified with two identification factors, Name and Date of Birth.    Chief Complaint   Patient presents with    Follow-up     6 month follow-up        /67 (Site: Left Upper Arm, Position: Sitting, Cuff Size: Small Adult)   Pulse 89   Temp 98 °F (36.7 °C) (Oral)   Ht 1.626 m (5' 4\")   Wt 46.7 kg (103 lb)   SpO2 97%   BMI 17.68 kg/m²       1. \"Have you been to the ER, urgent care clinic since your last visit?  Hospitalized since your last visit?\" Yes. [facility] in June for dental concerns     2. \"Have you seen or consulted any other health care providers outside of the [facility] since your last visit?\" No

## 2024-08-15 DIAGNOSIS — G25.81 RESTLESS LEGS SYNDROME: ICD-10-CM

## 2024-08-15 DIAGNOSIS — G47.9 SLEEP DISORDER, UNSPECIFIED: ICD-10-CM

## 2024-08-15 DIAGNOSIS — F33.8 OTHER RECURRENT DEPRESSIVE DISORDERS (HCC): ICD-10-CM

## 2024-08-18 RX ORDER — CLONAZEPAM 0.5 MG/1
TABLET ORAL
Qty: 45 TABLET | Refills: 0 | Status: SHIPPED | OUTPATIENT
Start: 2024-08-18 | End: 2024-09-17

## 2024-09-23 ENCOUNTER — HOSPITAL ENCOUNTER (OUTPATIENT)
Age: 69
Discharge: HOME | End: 2024-09-26
Payer: MEDICARE

## 2024-09-23 DIAGNOSIS — Z78.0 ASYMPTOMATIC MENOPAUSAL STATE: ICD-10-CM

## 2024-09-23 DIAGNOSIS — M81.0 AGE-RELATED OSTEOPOROSIS WITHOUT CURRENT PATHOLOGICAL FRACTURE: ICD-10-CM

## 2024-09-23 PROCEDURE — 77080 DXA BONE DENSITY AXIAL: CPT

## 2024-09-25 DIAGNOSIS — G47.9 SLEEP DISORDER, UNSPECIFIED: ICD-10-CM

## 2024-09-25 DIAGNOSIS — G25.81 RESTLESS LEGS SYNDROME: ICD-10-CM

## 2024-09-25 DIAGNOSIS — F33.8 OTHER RECURRENT DEPRESSIVE DISORDERS (HCC): ICD-10-CM

## 2024-09-26 RX ORDER — CLONAZEPAM 0.5 MG/1
TABLET ORAL
Qty: 45 TABLET | Refills: 0 | Status: SHIPPED | OUTPATIENT
Start: 2024-09-26 | End: 2024-10-26

## 2024-11-04 DIAGNOSIS — G47.9 SLEEP DISORDER, UNSPECIFIED: ICD-10-CM

## 2024-11-04 DIAGNOSIS — F33.8 OTHER RECURRENT DEPRESSIVE DISORDERS (HCC): ICD-10-CM

## 2024-11-04 DIAGNOSIS — G25.81 RESTLESS LEGS SYNDROME: ICD-10-CM

## 2024-11-04 NOTE — TELEPHONE ENCOUNTER
Pt last seen on 7/29/24. Due to return in 6 months.    Has appt on 2/6/25.    Rx last filled on 9/26/24 #45/0RF.    Will forward to MD for refill.

## 2024-11-06 RX ORDER — CLONAZEPAM 0.5 MG/1
TABLET ORAL
Qty: 45 TABLET | Refills: 0 | Status: SHIPPED | OUTPATIENT
Start: 2024-11-06 | End: 2024-12-06

## 2024-11-06 NOTE — TELEPHONE ENCOUNTER
Orders Placed This Encounter   Medications    clonazePAM (KLONOPIN) 0.5 MG tablet     Sig: TAKE 1 AND 1/2 TABLET BY MOUTH NIGHTLY AS NEEDED FOR ANXIETY FOR UP TO 30 DAYS..MAX DAILY AMOUNT 0.75MG     Dispense:  45 tablet     Refill:  0        reviewed 11/6/2024

## 2024-11-25 RX ORDER — PRAMIPEXOLE DIHYDROCHLORIDE 0.75 MG/1
TABLET ORAL
Qty: 90 TABLET | Refills: 0 | Status: SHIPPED | OUTPATIENT
Start: 2024-11-25

## 2024-11-25 NOTE — TELEPHONE ENCOUNTER
Last office visit 7/29/24  NEXT APPT  With Internal Medicine (Maribeth Solitario MD)  02/06/2025 at 9:40 AM    Last fill on mirapex 4/1/24 #90 with no additional refills

## 2024-12-20 DIAGNOSIS — F33.8 OTHER RECURRENT DEPRESSIVE DISORDERS: ICD-10-CM

## 2024-12-20 DIAGNOSIS — G47.9 SLEEP DISORDER, UNSPECIFIED: ICD-10-CM

## 2024-12-20 DIAGNOSIS — G25.81 RESTLESS LEGS SYNDROME: ICD-10-CM

## 2024-12-20 DIAGNOSIS — F33.8 OTHER RECURRENT DEPRESSIVE DISORDERS (HCC): ICD-10-CM

## 2024-12-20 RX ORDER — CLONAZEPAM 0.5 MG/1
TABLET ORAL
Qty: 45 TABLET | Refills: 0 | Status: SHIPPED | OUTPATIENT
Start: 2024-12-20 | End: 2025-01-19

## 2024-12-20 NOTE — TELEPHONE ENCOUNTER
Pt last seen on 7/29/24. Due to return in 6 months.    Has appt on 2/6/25.    Rx last filled on 11/6/24 #45/0RF.    Will forward to MD for refill.

## 2024-12-20 NOTE — TELEPHONE ENCOUNTER
Patient requested a refill for Clonazepam.  She is out of this medication and is requesting that it be sent in today.    Joelle Camara - [phone]

## 2024-12-23 RX ORDER — CLONAZEPAM 0.5 MG/1
TABLET ORAL
Qty: 45 TABLET | Refills: 0 | OUTPATIENT
Start: 2024-12-23 | End: 2025-01-22

## 2025-01-21 SDOH — ECONOMIC STABILITY: FOOD INSECURITY: WITHIN THE PAST 12 MONTHS, THE FOOD YOU BOUGHT JUST DIDN'T LAST AND YOU DIDN'T HAVE MONEY TO GET MORE.: NEVER TRUE

## 2025-01-21 SDOH — ECONOMIC STABILITY: INCOME INSECURITY: IN THE LAST 12 MONTHS, WAS THERE A TIME WHEN YOU WERE NOT ABLE TO PAY THE MORTGAGE OR RENT ON TIME?: NO

## 2025-01-21 SDOH — ECONOMIC STABILITY: FOOD INSECURITY: WITHIN THE PAST 12 MONTHS, YOU WORRIED THAT YOUR FOOD WOULD RUN OUT BEFORE YOU GOT MONEY TO BUY MORE.: NEVER TRUE

## 2025-01-21 SDOH — ECONOMIC STABILITY: TRANSPORTATION INSECURITY
IN THE PAST 12 MONTHS, HAS THE LACK OF TRANSPORTATION KEPT YOU FROM MEDICAL APPOINTMENTS OR FROM GETTING MEDICATIONS?: NO

## 2025-01-22 ENCOUNTER — OFFICE VISIT (OUTPATIENT)
Age: 70
End: 2025-01-22
Payer: MEDICARE

## 2025-01-22 ENCOUNTER — HOSPITAL ENCOUNTER (OUTPATIENT)
Facility: HOSPITAL | Age: 70
Discharge: HOME | End: 2025-01-25
Payer: MEDICARE

## 2025-01-22 VITALS
HEART RATE: 82 BPM | RESPIRATION RATE: 16 BRPM | DIASTOLIC BLOOD PRESSURE: 67 MMHG | BODY MASS INDEX: 17.72 KG/M2 | HEIGHT: 64 IN | TEMPERATURE: 97.6 F | OXYGEN SATURATION: 100 % | WEIGHT: 103.8 LBS | SYSTOLIC BLOOD PRESSURE: 115 MMHG
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
## 2025-01-22 DIAGNOSIS — M79.18 LEFT BUTTOCK PAIN: ICD-10-CM

## 2025-01-22 DIAGNOSIS — R53.83 OTHER FATIGUE: ICD-10-CM

## 2025-01-22 DIAGNOSIS — M79.18 LEFT BUTTOCK PAIN: Primary | ICD-10-CM

## 2025-01-22 PROCEDURE — 1123F ACP DISCUSS/DSCN MKR DOCD: CPT | Performed by: NURSE PRACTITIONER

## 2025-01-22 PROCEDURE — 3017F COLORECTAL CA SCREEN DOC REV: CPT | Performed by: NURSE PRACTITIONER

## 2025-01-22 PROCEDURE — 1090F PRES/ABSN URINE INCON ASSESS: CPT | Performed by: NURSE PRACTITIONER

## 2025-01-22 PROCEDURE — 99213 OFFICE O/P EST LOW 20 MIN: CPT | Performed by: NURSE PRACTITIONER

## 2025-01-22 PROCEDURE — 1160F RVW MEDS BY RX/DR IN RCRD: CPT | Performed by: NURSE PRACTITIONER

## 2025-01-22 PROCEDURE — G8419 CALC BMI OUT NRM PARAM NOF/U: HCPCS | Performed by: NURSE PRACTITIONER

## 2025-01-22 PROCEDURE — 73502 X-RAY EXAM HIP UNI 2-3 VIEWS: CPT

## 2025-01-22 PROCEDURE — 1159F MED LIST DOCD IN RCRD: CPT | Performed by: NURSE PRACTITIONER

## 2025-01-22 PROCEDURE — G8427 DOCREV CUR MEDS BY ELIG CLIN: HCPCS | Performed by: NURSE PRACTITIONER

## 2025-01-22 PROCEDURE — G8399 PT W/DXA RESULTS DOCUMENT: HCPCS | Performed by: NURSE PRACTITIONER

## 2025-01-22 PROCEDURE — 1036F TOBACCO NON-USER: CPT | Performed by: NURSE PRACTITIONER

## 2025-01-22 RX ORDER — ALENDRONATE SODIUM 70 MG/1
70 TABLET ORAL
COMMUNITY

## 2025-01-22 ASSESSMENT — PATIENT HEALTH QUESTIONNAIRE - PHQ9: DEPRESSION UNABLE TO ASSESS: PT REFUSES

## 2025-01-22 NOTE — PROGRESS NOTES
Joelle Camara (:  1955) is a 69 y.o. female,here for evaluation of the following chief complaint(s):  Fall (Side bone )  /67   Pulse 82   Temp 97.6 °F (36.4 °C) (Temporal)   Resp 16   Ht 1.626 m (5' 4\")   Wt 47.1 kg (103 lb 12.8 oz)   SpO2 100%   BMI 17.82 kg/m²       SUBJECTIVE/OBJECTIVE:    HPI:Patient reports she fell onto a ceramic floor in her kitchen 4 nights ago. It happened around 0200. She was up loading the  because she couldn't sleep. She feels like she may have just fallen asleep standing up vs fainting. She doesn't believe she had LOC. No head trauma. She fell onto left hip/buttock. She had 2-3 sleepless nights prior to the event. No further episodes. No chest pain.    Review of Systems   Musculoskeletal:         Left hip pain       Physical Exam  Constitutional:       Appearance: Normal appearance.   Musculoskeletal:      Comments: Pain of left buttock/sit bone; no bruising, no swelling; no pain with range of motion of left hip; no swelling   Skin:     General: Skin is warm.   Neurological:      General: No focal deficit present.      Mental Status: She is alert and oriented to person, place, and time.   Psychiatric:         Mood and Affect: Mood normal.         Behavior: Behavior normal.          ASSESSMENT/PLAN:  1. Left buttock pain  -     XR HIP 2-3 VW W PELVIS LEFT; Future  2. Fall, initial encounter  -     CBC with Auto Differential; Future  -     Comprehensive Metabolic Panel; Future  -     TSH; Future  3. Other fatigue  -     TSH; Future    Labs ordered  Xray ordered  Consider further cardiac workup if symptoms reoccur  --LELSEY Garcia - NP

## 2025-01-23 LAB
ALBUMIN SERPL-MCNC: 4 G/DL (ref 3.5–5)
ALBUMIN/GLOB SERPL: 1.3 (ref 1.1–2.2)
ALP SERPL-CCNC: 98 U/L (ref 45–117)
ALT SERPL-CCNC: 22 U/L (ref 12–78)
ANION GAP SERPL CALC-SCNC: 5 MMOL/L (ref 2–12)
AST SERPL-CCNC: 35 U/L (ref 15–37)
BASOPHILS # BLD: 0.07 K/UL (ref 0–0.1)
BASOPHILS NFR BLD: 0.9 % (ref 0–1)
BILIRUB SERPL-MCNC: 0.2 MG/DL (ref 0.2–1)
BUN SERPL-MCNC: 20 MG/DL (ref 6–20)
BUN/CREAT SERPL: 29 (ref 12–20)
CALCIUM SERPL-MCNC: 9.5 MG/DL (ref 8.5–10.1)
CHLORIDE SERPL-SCNC: 101 MMOL/L (ref 97–108)
CO2 SERPL-SCNC: 29 MMOL/L (ref 21–32)
CREAT SERPL-MCNC: 0.69 MG/DL (ref 0.55–1.02)
DIFFERENTIAL METHOD BLD: NORMAL
EOSINOPHIL # BLD: 0.35 K/UL (ref 0–0.4)
EOSINOPHIL NFR BLD: 4.5 % (ref 0–7)
ERYTHROCYTE [DISTWIDTH] IN BLOOD BY AUTOMATED COUNT: 12.5 % (ref 11.5–14.5)
GLOBULIN SER CALC-MCNC: 3.2 G/DL (ref 2–4)
GLUCOSE SERPL-MCNC: 89 MG/DL (ref 65–100)
HCT VFR BLD AUTO: 36.5 % (ref 35–47)
HGB BLD-MCNC: 11.9 G/DL (ref 11.5–16)
IMM GRANULOCYTES # BLD AUTO: 0.02 K/UL (ref 0–0.04)
IMM GRANULOCYTES NFR BLD AUTO: 0.3 % (ref 0–0.5)
LYMPHOCYTES # BLD: 2.08 K/UL (ref 0.8–3.5)
LYMPHOCYTES NFR BLD: 26.6 % (ref 12–49)
MCH RBC QN AUTO: 31 PG (ref 26–34)
MCHC RBC AUTO-ENTMCNC: 32.6 G/DL (ref 30–36.5)
MCV RBC AUTO: 95.1 FL (ref 80–99)
MONOCYTES # BLD: 0.56 K/UL (ref 0–1)
MONOCYTES NFR BLD: 7.2 % (ref 5–13)
NEUTS SEG # BLD: 4.73 K/UL (ref 1.8–8)
NEUTS SEG NFR BLD: 60.5 % (ref 32–75)
NRBC # BLD: 0 K/UL (ref 0–0.01)
NRBC BLD-RTO: 0 PER 100 WBC
PLATELET # BLD AUTO: 206 K/UL (ref 150–400)
PMV BLD AUTO: 11.8 FL (ref 8.9–12.9)
POTASSIUM SERPL-SCNC: 4.2 MMOL/L (ref 3.5–5.1)
PROT SERPL-MCNC: 7.2 G/DL (ref 6.4–8.2)
RBC # BLD AUTO: 3.84 M/UL (ref 3.8–5.2)
SODIUM SERPL-SCNC: 135 MMOL/L (ref 136–145)
TSH SERPL DL<=0.05 MIU/L-ACNC: 4.07 UIU/ML (ref 0.36–3.74)
WBC # BLD AUTO: 7.8 K/UL (ref 3.6–11)

## 2025-01-24 LAB — T4 SERPL-MCNC: 8.4 UG/DL (ref 4.8–13.9)

## 2025-01-28 DIAGNOSIS — G47.9 SLEEP DISORDER, UNSPECIFIED: ICD-10-CM

## 2025-01-28 DIAGNOSIS — G25.81 RESTLESS LEGS SYNDROME: ICD-10-CM

## 2025-01-28 DIAGNOSIS — F33.8 OTHER RECURRENT DEPRESSIVE DISORDERS (HCC): ICD-10-CM

## 2025-01-28 DIAGNOSIS — F33.8 OTHER RECURRENT DEPRESSIVE DISORDERS: ICD-10-CM

## 2025-01-28 RX ORDER — CLONAZEPAM 0.5 MG/1
TABLET ORAL
Qty: 45 TABLET | Refills: 0 | Status: SHIPPED | OUTPATIENT
Start: 2025-01-28 | End: 2025-02-27

## 2025-01-28 RX ORDER — CLONAZEPAM 0.5 MG/1
TABLET ORAL
Qty: 45 TABLET | Refills: 0 | OUTPATIENT
Start: 2025-01-28 | End: 2025-02-27

## 2025-01-28 NOTE — TELEPHONE ENCOUNTER
Orders Placed This Encounter   Medications    clonazePAM (KLONOPIN) 0.5 MG tablet     Sig: TAKE 1 AND 1/2 TABLET BY MOUTH NIGHTLY AS NEEDED FOR ANXIETY FOR UP TO 30 DAYS..MAX DAILY AMOUNT 0.75MG     Dispense:  45 tablet     Refill:  0        reviewed 1/28/2025

## 2025-01-28 NOTE — TELEPHONE ENCOUNTER
Last office visit 1/22/25  NEXT APPT  With Internal Medicine (Maribeth Solitario MD)  02/06/2025 at 9:40 AM    Last fill on klonopin 12/20/24 #45 with no additional refills

## 2025-01-29 ENCOUNTER — PATIENT MESSAGE (OUTPATIENT)
Age: 70
End: 2025-01-29

## 2025-01-29 DIAGNOSIS — Z01.89 PATIENT REQUESTED DIAGNOSTIC TESTING: Primary | ICD-10-CM

## 2025-01-29 DIAGNOSIS — Z01.89 PATIENT REQUESTED DIAGNOSTIC TESTING: ICD-10-CM

## 2025-01-30 LAB — VIT B12 SERPL-MCNC: 370 PG/ML (ref 193–986)

## 2025-02-18 RX ORDER — PRAMIPEXOLE 0.75 MG/1
0.75 TABLET, EXTENDED RELEASE ORAL NIGHTLY
COMMUNITY
End: 2025-02-21

## 2025-02-18 RX ORDER — PRAMIPEXOLE DIHYDROCHLORIDE 0.75 MG/1
TABLET ORAL
Qty: 90 TABLET | Refills: 0 | OUTPATIENT
Start: 2025-02-18

## 2025-02-18 RX ORDER — PRAMIPEXOLE DIHYDROCHLORIDE 0.75 MG/1
TABLET ORAL
Qty: 90 TABLET | Refills: 0 | Status: SHIPPED | OUTPATIENT
Start: 2025-02-18 | End: 2025-02-21 | Stop reason: SDUPTHER

## 2025-02-18 NOTE — TELEPHONE ENCOUNTER
Reason for call:  pt would like the name brand not the generic    Is this a new problem: Yes    Date of last appointment:  1/22/2025     Can we respond via [employer]: No    Best call back number:     Joelle Camara (Self) [phone] (Mobile)

## 2025-02-18 NOTE — TELEPHONE ENCOUNTER
Reason for call:  pt states her AWV has been resched twice and needs an order for PT for an injured IT band [facility] physical therapy pt has started PT    Is this a new problem: Yes    Date of last appointment:  1/22/2025     Can we respond via [employer]: No    Best call back number:     Joelle Camara (Self) [phone] (Mobile)

## 2025-02-18 NOTE — TELEPHONE ENCOUNTER
Medication Refill Request    Joelle Camara is requesting a refill of the following medication(s):     Pramipexole    Please send refill to:     [facility] [place] [address] [facility] [phone] - F [phone] [address] [facility] [address]  Phone: [phone] Fax: [phone]

## 2025-02-18 NOTE — TELEPHONE ENCOUNTER
Pt last seen on 7/29/24. Due to return in 6 months.    Has appt on 2/26/2025.    Rx last filled on 11/25/24 #90/0RF.    Will forward to MD for refill.

## 2025-02-20 RX ORDER — PRAMIPEXOLE DIHYDROCHLORIDE 0.75 MG/1
TABLET ORAL
Qty: 90 TABLET | Refills: 0 | OUTPATIENT
Start: 2025-02-20

## 2025-02-21 ENCOUNTER — TELEPHONE (OUTPATIENT)
Age: 70
End: 2025-02-21

## 2025-02-21 RX ORDER — PRAMIPEXOLE DIHYDROCHLORIDE 0.75 MG/1
TABLET ORAL
Qty: 90 TABLET | Refills: 1 | Status: SHIPPED | OUTPATIENT
Start: 2025-02-21

## 2025-02-21 NOTE — TELEPHONE ENCOUNTER
Spoke with patient and pharmacy    Brand name mirapex is no longer available.    Last script had dispense brand in the instructions    I asked if a verbal order could be given but the pharmacist said a new script must be sent in       Fell and injured left hip. Saw jackie 1/22/25 for this. She has already started physical therapy but they are requesting a referral

## 2025-02-21 NOTE — TELEPHONE ENCOUNTER
Reason for call:   Pt had been requesting Mirapex brand name for RLS she was informed by [facility] that Mirapex is no longer available and the pharmacy  said we are denying the generic and she is about to be out of med please refill the generic ASAP  also the patient needs a PT order for [facility] [phone]     Is this a new problem: Yes    Date of last appointment:  1/22/2025     Can we respond via [employer]: No    Best call back number:  [phone]

## 2025-02-26 ENCOUNTER — OFFICE VISIT (OUTPATIENT)
Age: 70
End: 2025-02-26
Payer: MEDICARE

## 2025-02-26 VITALS
SYSTOLIC BLOOD PRESSURE: 120 MMHG | BODY MASS INDEX: 19.28 KG/M2 | HEIGHT: 63 IN | DIASTOLIC BLOOD PRESSURE: 70 MMHG | WEIGHT: 108.8 LBS | TEMPERATURE: 97.1 F | RESPIRATION RATE: 16 BRPM | HEART RATE: 71 BPM | OXYGEN SATURATION: 98 %

## 2025-02-26 DIAGNOSIS — R55 PRE-SYNCOPE: ICD-10-CM

## 2025-02-26 DIAGNOSIS — G47.9 SLEEP DISORDER, UNSPECIFIED: ICD-10-CM

## 2025-02-26 DIAGNOSIS — M25.551 RIGHT HIP PAIN: ICD-10-CM

## 2025-02-26 DIAGNOSIS — Z00.00 MEDICARE ANNUAL WELLNESS VISIT, SUBSEQUENT: Primary | ICD-10-CM

## 2025-02-26 DIAGNOSIS — M81.0 AGE-RELATED OSTEOPOROSIS WITHOUT CURRENT PATHOLOGICAL FRACTURE: ICD-10-CM

## 2025-02-26 DIAGNOSIS — F41.8 DEPRESSION WITH ANXIETY: ICD-10-CM

## 2025-02-26 DIAGNOSIS — F33.8 OTHER RECURRENT DEPRESSIVE DISORDERS: ICD-10-CM

## 2025-02-26 DIAGNOSIS — G25.81 RESTLESS LEGS SYNDROME: ICD-10-CM

## 2025-02-26 PROCEDURE — 99214 OFFICE O/P EST MOD 30 MIN: CPT | Performed by: INTERNAL MEDICINE

## 2025-02-26 RX ORDER — CHOLECALCIFEROL (VITAMIN D3) 25 MCG
1000 TABLET ORAL DAILY
COMMUNITY

## 2025-02-26 RX ORDER — CLONAZEPAM 0.5 MG/1
TABLET ORAL
Qty: 45 TABLET | Refills: 0 | Status: SHIPPED | OUTPATIENT
Start: 2025-02-26 | End: 2025-03-26

## 2025-02-26 ASSESSMENT — PATIENT HEALTH QUESTIONNAIRE - PHQ9
SUM OF ALL RESPONSES TO PHQ QUESTIONS 1-9: 0
6. FEELING BAD ABOUT YOURSELF - OR THAT YOU ARE A FAILURE OR HAVE LET YOURSELF OR YOUR FAMILY DOWN: NOT AT ALL
10. IF YOU CHECKED OFF ANY PROBLEMS, HOW DIFFICULT HAVE THESE PROBLEMS MADE IT FOR YOU TO DO YOUR WORK, TAKE CARE OF THINGS AT HOME, OR GET ALONG WITH OTHER PEOPLE: NOT DIFFICULT AT ALL
SUM OF ALL RESPONSES TO PHQ QUESTIONS 1-9: 0
2. FEELING DOWN, DEPRESSED OR HOPELESS: NOT AT ALL
9. THOUGHTS THAT YOU WOULD BE BETTER OFF DEAD, OR OF HURTING YOURSELF: NOT AT ALL
7. TROUBLE CONCENTRATING ON THINGS, SUCH AS READING THE NEWSPAPER OR WATCHING TELEVISION: NOT AT ALL
2. FEELING DOWN, DEPRESSED OR HOPELESS: NOT AT ALL
3. TROUBLE FALLING OR STAYING ASLEEP: NOT AT ALL
SUM OF ALL RESPONSES TO PHQ9 QUESTIONS 1 & 2: 0
5. POOR APPETITE OR OVEREATING: NOT AT ALL
10. IF YOU CHECKED OFF ANY PROBLEMS, HOW DIFFICULT HAVE THESE PROBLEMS MADE IT FOR YOU TO DO YOUR WORK, TAKE CARE OF THINGS AT HOME, OR GET ALONG WITH OTHER PEOPLE: NOT DIFFICULT AT ALL
8. MOVING OR SPEAKING SO SLOWLY THAT OTHER PEOPLE COULD HAVE NOTICED. OR THE OPPOSITE, BEING SO FIGETY OR RESTLESS THAT YOU HAVE BEEN MOVING AROUND A LOT MORE THAN USUAL: NOT AT ALL
9. THOUGHTS THAT YOU WOULD BE BETTER OFF DEAD, OR OF HURTING YOURSELF: NOT AT ALL
SUM OF ALL RESPONSES TO PHQ QUESTIONS 1-9: 0
4. FEELING TIRED OR HAVING LITTLE ENERGY: NOT AT ALL
4. FEELING TIRED OR HAVING LITTLE ENERGY: NOT AT ALL
1. LITTLE INTEREST OR PLEASURE IN DOING THINGS: NOT AT ALL
7. TROUBLE CONCENTRATING ON THINGS, SUCH AS READING THE NEWSPAPER OR WATCHING TELEVISION: NOT AT ALL
5. POOR APPETITE OR OVEREATING: NOT AT ALL
6. FEELING BAD ABOUT YOURSELF - OR THAT YOU ARE A FAILURE OR HAVE LET YOURSELF OR YOUR FAMILY DOWN: NOT AT ALL
SUM OF ALL RESPONSES TO PHQ QUESTIONS 1-9: 0
8. MOVING OR SPEAKING SO SLOWLY THAT OTHER PEOPLE COULD HAVE NOTICED. OR THE OPPOSITE, BEING SO FIGETY OR RESTLESS THAT YOU HAVE BEEN MOVING AROUND A LOT MORE THAN USUAL: NOT AT ALL
3. TROUBLE FALLING OR STAYING ASLEEP: NOT AT ALL

## 2025-02-26 ASSESSMENT — LIFESTYLE VARIABLES
HOW OFTEN DO YOU HAVE A DRINK CONTAINING ALCOHOL: 2-3 TIMES A WEEK
HOW MANY STANDARD DRINKS CONTAINING ALCOHOL DO YOU HAVE ON A TYPICAL DAY: 1 OR 2

## 2025-02-26 NOTE — PATIENT INSTRUCTIONS

## 2025-02-26 NOTE — PROGRESS NOTES
Medicare Annual Wellness Visit    Joelle Camara is here for Medicare AWV    Assessment & Plan  1. Medicare wellness exam  - No current ACP on file  - Provided Lurdes Hays template for review and completion  -[facility] decision maker reviewed with patient and updated.      2. Fall  - Experienced a fall 1 month ago, felt crumbling rather than falling  - No lightheadedness or loss of consciousness  - Landed on left hip, pain primarily in right hip  - Feeling unwell and fatigued  - Lab studies normal  - Holter monitor to rule out arrhythmias  - Physical therapy prescription for right hip pain provided    3. Depression with anxiety  - On citalopram 5 mg daily and clonazepam 0.5 mg at bedtime, also aids restless leg syndrome    4. Restless leg syndrome  - On Mirapex 0.75 mg nightly and clonazepam  - Traveling to [place], requested and received a letter for mobility during the flight  -clonazepam refilled.  reviewed and closely monitored    5. Osteoporosis  - On Fosamax, managed by gynecologist    Follow-up in 6 months    Orders Placed This Encounter    External Referral To Physical Therapy     Referral Priority:   Routine     Referral Type:   Eval and Treat     Referral Reason:   Specialty Services Required     Requested Specialty:   Physical Therapy     Number of Visits Requested:   1    MAGNESIUM OXIDE 400 PO     Sig: Take by mouth    vitamin D (VITAMIN D3) 25 MCG (1000 UT) TABS tablet     Sig: Take 1 tablet by mouth daily    Tdap (ADACEL) 5-2-15.5 LF-MCG/0.5 injection     Sig: Inject 0.5 mLs into the muscle once for 1 dose     Dispense:  0.5 mL     Refill:  0    clonazePAM (KLONOPIN) 0.5 MG tablet     Si 1/2 tablet nightly as needed for sleep and restless legs     Dispense:  45 tablet     Refill:  0    Extended cardiac holter monitor (3 days-14 day)     Standing Status:   Future     Standing Expiration Date:   2026     Order Specific Question:   Days to wear monitor?     Answer:   7

## 2025-02-26 NOTE — PROGRESS NOTES
Chief Complaint   Patient presents with    Medicare AWV     eviewed record in preparation for visit and have obtained necessary documentation.    Identified pt with two pt identifiers(name and ).      Health Maintenance Due   Topic    Hepatitis B vaccine (3 of 3 - 19+ 3-dose series)    Shingles vaccine (1 of 2)    Pneumococcal 50+ years Vaccine (1 of 1 - PCV)    DTaP/Tdap/Td vaccine (2 - Td or Tdap)    Flu vaccine (1)    COVID-19 Vaccine ( season)    Annual Wellness Visit (Medicare)          Chief Complaint   Patient presents with    Medicare AWV        Wt Readings from Last 3 Encounters:   25 49.4 kg (108 lb 12.8 oz)   25 47.1 kg (103 lb 12.8 oz)   24 46.7 kg (103 lb)     Temp Readings from Last 3 Encounters:   25 97.1 °F (36.2 °C) (Temporal)   25 97.6 °F (36.4 °C) (Temporal)   24 98 °F (36.7 °C) (Oral)     BP Readings from Last 3 Encounters:   25 120/70   25 115/67   24 105/67     Pulse Readings from Last 3 Encounters:   25 71   25 82   24 89           Learning Assessment:  :    Failed to redirect to the Timeline version of the REVFS SmartLink.    Depression Screening:  :         No data to display                Fall Risk Assessment:  :    Failed to redirect to the Timeline version of the REVFS SmartLink.    Abuse Screening:  :         No data to display

## 2025-03-24 ENCOUNTER — HOSPITAL ENCOUNTER (OUTPATIENT)
Facility: HOSPITAL | Age: 70
Discharge: HOME | End: 2025-03-26
Attending: INTERNAL MEDICINE
Payer: MEDICARE

## 2025-03-24 DIAGNOSIS — R55 PRE-SYNCOPE: ICD-10-CM

## 2025-03-24 PROCEDURE — 93242 EXT ECG>48HR<7D RECORDING: CPT

## 2025-03-24 NOTE — NURSING NOTE
7 day Extended Holter monitor placed on patient. Patient educated on device and its use. Also reviewed materials provided in kit.  Extra supplies available within kit. Patient to return the device to [employer] via [employer] after order duration. Contact information provided to the department for any questions. No questions at this time.      Monitor #  [id number]

## 2025-03-31 DIAGNOSIS — G25.81 RESTLESS LEGS SYNDROME: ICD-10-CM

## 2025-03-31 DIAGNOSIS — F33.8 OTHER RECURRENT DEPRESSIVE DISORDERS: ICD-10-CM

## 2025-03-31 DIAGNOSIS — G47.9 SLEEP DISORDER, UNSPECIFIED: ICD-10-CM

## 2025-03-31 RX ORDER — CLONAZEPAM 0.5 MG/1
TABLET ORAL
Qty: 45 TABLET | Refills: 0 | OUTPATIENT
Start: 2025-03-31 | End: 2025-04-28

## 2025-04-02 ENCOUNTER — PATIENT MESSAGE (OUTPATIENT)
Age: 70
End: 2025-04-02

## 2025-04-02 DIAGNOSIS — F33.8 OTHER RECURRENT DEPRESSIVE DISORDERS: ICD-10-CM

## 2025-04-02 DIAGNOSIS — G25.81 RESTLESS LEGS SYNDROME: ICD-10-CM

## 2025-04-02 DIAGNOSIS — G47.9 SLEEP DISORDER, UNSPECIFIED: ICD-10-CM

## 2025-04-02 RX ORDER — CLONAZEPAM 0.5 MG/1
TABLET ORAL
Qty: 45 TABLET | Refills: 0 | OUTPATIENT
Start: 2025-04-02

## 2025-04-02 RX ORDER — CLONAZEPAM 0.5 MG/1
TABLET ORAL
Qty: 45 TABLET | Refills: 0 | Status: CANCELLED | OUTPATIENT
Start: 2025-04-02 | End: 2025-04-30

## 2025-04-02 RX ORDER — CLONAZEPAM 0.5 MG/1
TABLET ORAL
Qty: 45 TABLET | Refills: 0 | Status: SHIPPED | OUTPATIENT
Start: 2025-04-02 | End: 2025-04-30

## 2025-04-02 NOTE — TELEPHONE ENCOUNTER
Last office visit 2/26/25  Next Appt  With Internal Medicine (Maribeth Solitario MD)  04/03/2025 at 8:20 AM    Last fill on clonazepam 2/26/25 #45 with no additional refills

## 2025-04-02 NOTE — TELEPHONE ENCOUNTER
Medication Refill Request    Joelle ORDONEZ Lamontmichael is requesting a refill of the following medication(s):   clonazePAM (KLONOPIN) 0.5 MG tablet ()   Please send refill to:     [facility] [address] [facility] -  [phone] - F [phone] [address] [facility] [address]  Phone: [phone] Fax: [phone]

## 2025-04-02 NOTE — TELEPHONE ENCOUNTER
Orders Placed This Encounter   Medications    clonazePAM (KLONOPIN) 0.5 MG tablet     Si 1/2 tablet nightly as needed for sleep and restless legs     Dispense:  45 tablet     Refill:  0        reviewed 2025

## 2025-04-03 ENCOUNTER — OFFICE VISIT (OUTPATIENT)
Age: 70
End: 2025-04-03
Payer: MEDICARE

## 2025-04-03 VITALS
TEMPERATURE: 99.2 F | WEIGHT: 108.6 LBS | OXYGEN SATURATION: 100 % | RESPIRATION RATE: 16 BRPM | HEIGHT: 63 IN | SYSTOLIC BLOOD PRESSURE: 104 MMHG | DIASTOLIC BLOOD PRESSURE: 64 MMHG | BODY MASS INDEX: 19.24 KG/M2 | HEART RATE: 92 BPM

## 2025-04-03 DIAGNOSIS — I73.00 RAYNAUD'S PHENOMENON WITHOUT GANGRENE: Primary | ICD-10-CM

## 2025-04-03 PROCEDURE — G8427 DOCREV CUR MEDS BY ELIG CLIN: HCPCS | Performed by: INTERNAL MEDICINE

## 2025-04-03 PROCEDURE — 3017F COLORECTAL CA SCREEN DOC REV: CPT | Performed by: INTERNAL MEDICINE

## 2025-04-03 PROCEDURE — 1123F ACP DISCUSS/DSCN MKR DOCD: CPT | Performed by: INTERNAL MEDICINE

## 2025-04-03 PROCEDURE — 1090F PRES/ABSN URINE INCON ASSESS: CPT | Performed by: INTERNAL MEDICINE

## 2025-04-03 PROCEDURE — 1036F TOBACCO NON-USER: CPT | Performed by: INTERNAL MEDICINE

## 2025-04-03 PROCEDURE — 1159F MED LIST DOCD IN RCRD: CPT | Performed by: INTERNAL MEDICINE

## 2025-04-03 PROCEDURE — G8399 PT W/DXA RESULTS DOCUMENT: HCPCS | Performed by: INTERNAL MEDICINE

## 2025-04-03 PROCEDURE — G8420 CALC BMI NORM PARAMETERS: HCPCS | Performed by: INTERNAL MEDICINE

## 2025-04-03 PROCEDURE — 99213 OFFICE O/P EST LOW 20 MIN: CPT | Performed by: INTERNAL MEDICINE

## 2025-04-03 PROCEDURE — 1125F AMNT PAIN NOTED PAIN PRSNT: CPT | Performed by: INTERNAL MEDICINE

## 2025-04-03 SDOH — ECONOMIC STABILITY: FOOD INSECURITY: WITHIN THE PAST 12 MONTHS, THE FOOD YOU BOUGHT JUST DIDN'T LAST AND YOU DIDN'T HAVE MONEY TO GET MORE.: NEVER TRUE

## 2025-04-03 SDOH — ECONOMIC STABILITY: FOOD INSECURITY: WITHIN THE PAST 12 MONTHS, YOU WORRIED THAT YOUR FOOD WOULD RUN OUT BEFORE YOU GOT MONEY TO BUY MORE.: NEVER TRUE

## 2025-04-03 ASSESSMENT — PATIENT HEALTH QUESTIONNAIRE - PHQ9
SUM OF ALL RESPONSES TO PHQ QUESTIONS 1-9: 5
SUM OF ALL RESPONSES TO PHQ QUESTIONS 1-9: 5
7. TROUBLE CONCENTRATING ON THINGS, SUCH AS READING THE NEWSPAPER OR WATCHING TELEVISION: NOT AT ALL
3. TROUBLE FALLING OR STAYING ASLEEP: SEVERAL DAYS
6. FEELING BAD ABOUT YOURSELF - OR THAT YOU ARE A FAILURE OR HAVE LET YOURSELF OR YOUR FAMILY DOWN: NOT AT ALL
2. FEELING DOWN, DEPRESSED OR HOPELESS: SEVERAL DAYS
2. FEELING DOWN, DEPRESSED OR HOPELESS: SEVERAL DAYS
8. MOVING OR SPEAKING SO SLOWLY THAT OTHER PEOPLE COULD HAVE NOTICED. OR THE OPPOSITE, BEING SO FIGETY OR RESTLESS THAT YOU HAVE BEEN MOVING AROUND A LOT MORE THAN USUAL: SEVERAL DAYS
SUM OF ALL RESPONSES TO PHQ QUESTIONS 1-9: 2
1. LITTLE INTEREST OR PLEASURE IN DOING THINGS: SEVERAL DAYS
10. IF YOU CHECKED OFF ANY PROBLEMS, HOW DIFFICULT HAVE THESE PROBLEMS MADE IT FOR YOU TO DO YOUR WORK, TAKE CARE OF THINGS AT HOME, OR GET ALONG WITH OTHER PEOPLE: SOMEWHAT DIFFICULT
4. FEELING TIRED OR HAVING LITTLE ENERGY: SEVERAL DAYS
SUM OF ALL RESPONSES TO PHQ QUESTIONS 1-9: 2
SUM OF ALL RESPONSES TO PHQ QUESTIONS 1-9: 2
5. POOR APPETITE OR OVEREATING: NOT AT ALL
SUM OF ALL RESPONSES TO PHQ QUESTIONS 1-9: 5
9. THOUGHTS THAT YOU WOULD BE BETTER OFF DEAD, OR OF HURTING YOURSELF: NOT AT ALL
SUM OF ALL RESPONSES TO PHQ QUESTIONS 1-9: 5
1. LITTLE INTEREST OR PLEASURE IN DOING THINGS: SEVERAL DAYS
SUM OF ALL RESPONSES TO PHQ QUESTIONS 1-9: 2

## 2025-04-03 NOTE — PROGRESS NOTES
Joelle Camara is a 70 y.o. female Established patient who presents today for evaluation of the following -           Assessment & Plan     1. Raynaud's phenomenon without gangrene  -discussed identifying her triggers and to avoid  -offered use of calcium channel blocker , but she declined at this time.   -will continue to monitor.                   History of Present Illness     -raynauds getting worse. . Has noted intermittent episodes of finger tips, middle finger typically turning white.  Will take about 20 minutes for color to return to normal.  Not always related to cold contact.            Objective:     Wt Readings from Last 3 Encounters:   02/26/25 49.4 kg (108 lb 12.8 oz)   01/22/25 47.1 kg (103 lb 12.8 oz)   07/29/24 46.7 kg (103 lb)     BP Readings from Last 3 Encounters:   02/26/25 120/70   01/22/25 115/67   07/29/24 105/67     /64   Pulse 92   Temp 99.2 °F (37.3 °C) (Temporal)   Resp 16   Ht 1.602 m (5' 3.09\")   Wt 49.3 kg (108 lb 9.6 oz)   SpO2 100%   BMI 19.18 kg/m²   General appearance: alert, appears stated age, and cooperative  Head: Normocephalic, without obvious abnormality, atraumatic  Extremities: No abnormalities noted in both hands  Pulses: +2 radial pulses bilateral.            The patient (or guardian, if applicable) and other individuals in attendance with the patient were advised that Artificial Intelligence will be utilized during this visit to record and process the conversation to generate a clinical note. The patient (or guardian, if applicable) and other individuals in attendance at the appointment consented to the use of AI, including the recording.

## 2025-04-15 ENCOUNTER — OFFICE VISIT (OUTPATIENT)
Age: 70
End: 2025-04-15
Payer: MEDICARE

## 2025-04-15 VITALS
WEIGHT: 108.6 LBS | RESPIRATION RATE: 16 BRPM | HEIGHT: 63 IN | HEART RATE: 88 BPM | BODY MASS INDEX: 19.24 KG/M2 | DIASTOLIC BLOOD PRESSURE: 60 MMHG | OXYGEN SATURATION: 98 % | SYSTOLIC BLOOD PRESSURE: 100 MMHG | TEMPERATURE: 97.8 F

## 2025-04-15 DIAGNOSIS — F41.8 DEPRESSION WITH ANXIETY: Primary | ICD-10-CM

## 2025-04-15 PROCEDURE — 1159F MED LIST DOCD IN RCRD: CPT | Performed by: INTERNAL MEDICINE

## 2025-04-15 PROCEDURE — 99213 OFFICE O/P EST LOW 20 MIN: CPT | Performed by: INTERNAL MEDICINE

## 2025-04-15 PROCEDURE — 1123F ACP DISCUSS/DSCN MKR DOCD: CPT | Performed by: INTERNAL MEDICINE

## 2025-04-15 PROCEDURE — 3017F COLORECTAL CA SCREEN DOC REV: CPT | Performed by: INTERNAL MEDICINE

## 2025-04-15 PROCEDURE — 1036F TOBACCO NON-USER: CPT | Performed by: INTERNAL MEDICINE

## 2025-04-15 PROCEDURE — G8420 CALC BMI NORM PARAMETERS: HCPCS | Performed by: INTERNAL MEDICINE

## 2025-04-15 PROCEDURE — G8427 DOCREV CUR MEDS BY ELIG CLIN: HCPCS | Performed by: INTERNAL MEDICINE

## 2025-04-15 PROCEDURE — G8399 PT W/DXA RESULTS DOCUMENT: HCPCS | Performed by: INTERNAL MEDICINE

## 2025-04-15 PROCEDURE — 1125F AMNT PAIN NOTED PAIN PRSNT: CPT | Performed by: INTERNAL MEDICINE

## 2025-04-15 PROCEDURE — 1090F PRES/ABSN URINE INCON ASSESS: CPT | Performed by: INTERNAL MEDICINE

## 2025-04-15 RX ORDER — CITALOPRAM HYDROBROMIDE 10 MG/1
15 TABLET ORAL DAILY
Qty: 135 TABLET | Refills: 1 | Status: SHIPPED | OUTPATIENT
Start: 2025-04-15

## 2025-04-15 NOTE — PROGRESS NOTES
Joelle Camara is a 70 y.o. female Established patient who presents today for evaluation of the following -           Assessment & Plan  1. Depression: Chronic.  - Symptoms: low mood, trouble concentrating, social withdrawal, exacerbated by son's upcoming wedding and home repair issues.  - Discussed continuous medication use to prevent mood fluctuations; potential benefits of long-term citalopram 15 mg.  - Prescribed citalopram 10 mg daily for one week, then increased to 1.5 tablets daily. Note sent to pharmacy for tablet splitting. Prescription for 90 days with one refill.           No orders of the defined types were placed in this encounter.    Follow-up Disposition:     -has established follow up in August, 2025.                    History of Present Illness  The patient came in to discuss her depression.     She has been managing her medication on her own, previously tapering off 5 mg of citalopram, because she felt she didn't need the medication any longer.   In past, she has only needed to use citalopram in the winter for seasonal affective disorder.  In the last year, she has needed use longer.      She finds that starting with 10 mg helps, but 15 mg works best for her. She usually takes the medication during the winter for seasonal affective disorder, except for one year when she needed it all year round. Right now, she's feeling really down, having trouble focusing, and doesn't want to be around other people. This is especially hard because her son's wedding is in 2 weeks, and she'll be attending without her  who passed several years ago.  She's also stressed out because someone did a terrible job on her deck, and she has to go to [employer] court to get her money back. She has no thoughts of harming herself or others and is currently in counseling.           Objective:     Wt Readings from Last 3 Encounters:   04/15/25 49.3 kg (108 lb 9.6 oz)   04/03/25 49.3 kg (108 lb 9.6 oz)   02/26/25 49.4

## 2025-04-28 ENCOUNTER — TELEPHONE (OUTPATIENT)
Age: 70
End: 2025-04-28

## 2025-04-28 DIAGNOSIS — F33.8 OTHER RECURRENT DEPRESSIVE DISORDERS: ICD-10-CM

## 2025-04-28 DIAGNOSIS — G47.9 SLEEP DISORDER, UNSPECIFIED: ICD-10-CM

## 2025-04-28 DIAGNOSIS — G25.81 RESTLESS LEGS SYNDROME: ICD-10-CM

## 2025-04-28 RX ORDER — CLONAZEPAM 0.5 MG/1
TABLET ORAL
Qty: 45 TABLET | Refills: 0 | Status: SHIPPED | OUTPATIENT
Start: 2025-04-28 | End: 2025-05-26

## 2025-04-28 NOTE — TELEPHONE ENCOUNTER
Patient called right back after leaving previous message requesting refill of Clonazepam.  Wanted to let Dr. Solitario know she is leaving [facility] for her son's wedding and will run out of this medication while away.  It is an early refill request, but will need it while she is gone.

## 2025-04-28 NOTE — TELEPHONE ENCOUNTER
Medication Refill Request    Jeolle ORDONEZ Hoa is requesting a refill of the following medication(s):     Clonazepam (Klonopin) 0.5 MG tablet    Please send refill to:     [facility] [address] [facility] -  [phone] - F [phone] [address] [facility] [address]  Phone: [phone] Fax: [phone]

## 2025-04-29 DIAGNOSIS — G47.9 SLEEP DISORDER, UNSPECIFIED: ICD-10-CM

## 2025-04-29 DIAGNOSIS — F33.8 OTHER RECURRENT DEPRESSIVE DISORDERS: ICD-10-CM

## 2025-04-29 DIAGNOSIS — G25.81 RESTLESS LEGS SYNDROME: ICD-10-CM

## 2025-04-30 RX ORDER — CLONAZEPAM 0.5 MG/1
TABLET ORAL
Qty: 45 TABLET | Refills: 0 | OUTPATIENT
Start: 2025-04-30 | End: 2025-05-27

## 2025-05-14 ENCOUNTER — HOSPITAL ENCOUNTER (OUTPATIENT)
Age: 70
Discharge: HOME | End: 2025-05-17
Payer: MEDICARE

## 2025-05-14 VITALS — HEIGHT: 64 IN | WEIGHT: 108 LBS | BODY MASS INDEX: 18.44 KG/M2

## 2025-05-14 DIAGNOSIS — Z12.31 SCREENING MAMMOGRAM, ENCOUNTER FOR: ICD-10-CM

## 2025-05-14 PROCEDURE — 77063 BREAST TOMOSYNTHESIS BI: CPT

## 2025-05-15 RX ORDER — PRAMIPEXOLE DIHYDROCHLORIDE 0.75 MG/1
0.75 TABLET ORAL NIGHTLY
Qty: 90 TABLET | Refills: 1 | Status: SHIPPED | OUTPATIENT
Start: 2025-05-15

## 2025-05-15 NOTE — TELEPHONE ENCOUNTER
Rx sent to pharmacy as previously filled and verified by Verbal Order Read Back with provider.    NV 08/26/2025

## 2025-05-16 ENCOUNTER — RESULTS FOLLOW-UP (OUTPATIENT)
Age: 70
End: 2025-05-16

## 2025-05-16 ENCOUNTER — TELEPHONE (OUTPATIENT)
Age: 70
End: 2025-05-16

## 2025-05-16 NOTE — TELEPHONE ENCOUNTER
Received a mychart message from patient regarding results  of a extended cardiac monitor .  Monitor was place on patient 03/24/2025 @ [facility].  Patient mailed monitor to Li on 04/01/2025.  After being transferred 4 times and told results were not in, Halima in the Stress lab told me the results were in and would have it read today or Monday.  Provider notified.

## 2025-05-16 NOTE — TELEPHONE ENCOUNTER
Received call from Antonietta with Dr. Solitario's office asking if results from Holter monitor ordered by Dr. Solitario had resulted.    Per Epic -the extended Holter monitor results are Active-in process and not released.    Explained they may need to contact Olivia to make sure monitor was returned and received.    Number for Stress Lab here at [facility] provided as pt had it placed at this location per Antonietta.

## 2025-06-02 DIAGNOSIS — G25.81 RESTLESS LEGS SYNDROME: ICD-10-CM

## 2025-06-02 DIAGNOSIS — G47.9 SLEEP DISORDER, UNSPECIFIED: ICD-10-CM

## 2025-06-02 DIAGNOSIS — F33.8 OTHER RECURRENT DEPRESSIVE DISORDERS: ICD-10-CM

## 2025-06-02 RX ORDER — CLONAZEPAM 0.5 MG/1
TABLET ORAL
Qty: 45 TABLET | Refills: 0 | Status: CANCELLED | OUTPATIENT
Start: 2025-06-02 | End: 2025-06-30

## 2025-06-03 ENCOUNTER — TELEPHONE (OUTPATIENT)
Age: 70
End: 2025-06-03

## 2025-06-03 DIAGNOSIS — G25.81 RESTLESS LEGS SYNDROME: ICD-10-CM

## 2025-06-03 DIAGNOSIS — F33.8 OTHER RECURRENT DEPRESSIVE DISORDERS: ICD-10-CM

## 2025-06-03 DIAGNOSIS — G47.9 SLEEP DISORDER, UNSPECIFIED: ICD-10-CM

## 2025-06-03 RX ORDER — CLONAZEPAM 0.5 MG/1
TABLET ORAL
Qty: 45 TABLET | Refills: 0 | Status: SHIPPED | OUTPATIENT
Start: 2025-06-03 | End: 2025-07-01

## 2025-06-03 NOTE — TELEPHONE ENCOUNTER
Orders Placed This Encounter   Medications    clonazePAM (KLONOPIN) 0.5 MG tablet     Si 1/2 tablet nightly as needed for sleep and restless legs     Dispense:  45 tablet     Refill:  0        reviewed 6/3/2025

## 2025-06-03 NOTE — TELEPHONE ENCOUNTER
Medication Refill Request    Joelle Camara is requesting a refill of the following medication(s): Pt states she almost out of medication   clonazePAM (KLONOPIN) 0.5 MG tablet   Please send refill to:     [facility] [address] [facility] -  [phone] - F [phone] [address] [facility] [address]  Phone: [phone] Fax: [phone]

## 2025-07-02 DIAGNOSIS — F33.8 OTHER RECURRENT DEPRESSIVE DISORDERS: ICD-10-CM

## 2025-07-02 DIAGNOSIS — G47.9 SLEEP DISORDER, UNSPECIFIED: ICD-10-CM

## 2025-07-02 DIAGNOSIS — G25.81 RESTLESS LEGS SYNDROME: ICD-10-CM

## 2025-07-03 RX ORDER — CLONAZEPAM 0.5 MG/1
TABLET ORAL
Qty: 45 TABLET | Refills: 0 | Status: SHIPPED | OUTPATIENT
Start: 2025-07-03 | End: 2025-07-30

## 2025-07-03 NOTE — TELEPHONE ENCOUNTER
Orders Placed This Encounter   Medications    clonazePAM (KLONOPIN) 0.5 MG tablet     Si 1/2 tablet nightly as needed for sleep and restless legs     Dispense:  45 tablet     Refill:  0        reviewed 7/3/2025

## 2025-07-05 ENCOUNTER — TELEMEDICINE ON DEMAND (OUTPATIENT)
Age: 70
End: 2025-07-05
Payer: MEDICARE

## 2025-07-05 DIAGNOSIS — M19.90 ARTHRITIS: Primary | ICD-10-CM

## 2025-07-05 DIAGNOSIS — M54.50 ACUTE LEFT-SIDED LOW BACK PAIN WITHOUT SCIATICA: ICD-10-CM

## 2025-07-05 PROCEDURE — G8399 PT W/DXA RESULTS DOCUMENT: HCPCS | Performed by: NURSE PRACTITIONER

## 2025-07-05 PROCEDURE — G8420 CALC BMI NORM PARAMETERS: HCPCS | Performed by: NURSE PRACTITIONER

## 2025-07-05 PROCEDURE — G8427 DOCREV CUR MEDS BY ELIG CLIN: HCPCS | Performed by: NURSE PRACTITIONER

## 2025-07-05 PROCEDURE — 3017F COLORECTAL CA SCREEN DOC REV: CPT | Performed by: NURSE PRACTITIONER

## 2025-07-05 PROCEDURE — 1036F TOBACCO NON-USER: CPT | Performed by: NURSE PRACTITIONER

## 2025-07-05 PROCEDURE — 1090F PRES/ABSN URINE INCON ASSESS: CPT | Performed by: NURSE PRACTITIONER

## 2025-07-05 PROCEDURE — 1159F MED LIST DOCD IN RCRD: CPT | Performed by: NURSE PRACTITIONER

## 2025-07-05 PROCEDURE — 99213 OFFICE O/P EST LOW 20 MIN: CPT | Performed by: NURSE PRACTITIONER

## 2025-07-05 PROCEDURE — 1160F RVW MEDS BY RX/DR IN RCRD: CPT | Performed by: NURSE PRACTITIONER

## 2025-07-05 PROCEDURE — 1123F ACP DISCUSS/DSCN MKR DOCD: CPT | Performed by: NURSE PRACTITIONER

## 2025-07-05 RX ORDER — DICLOFENAC SODIUM 75 MG/1
75 TABLET, DELAYED RELEASE ORAL 2 TIMES DAILY
Qty: 60 TABLET | Refills: 3 | Status: SHIPPED | OUTPATIENT
Start: 2025-07-05

## 2025-07-05 ASSESSMENT — ENCOUNTER SYMPTOMS
COLOR CHANGE: 0
BACK PAIN: 1
GASTROINTESTINAL NEGATIVE: 1
RESPIRATORY NEGATIVE: 1

## 2025-07-05 NOTE — PROGRESS NOTES
[facility] Virtualist Encounter Note       Chief Complaint   Patient presents with    back pain on left side     Started yesterday is doing better now after placing ice on it.       HPI:    Joelle Camara (:  1955) has requested an audio/video evaluation for the following concern(s):    This is an established patient of Dr. Curry. This is the first time I am seeing the patient today. She requested this visit for low back pain on the left side more towards the buttocks. She states she has been doing a lot around her home but does not recall any injury. She thinks she may have slept wrong but was not sure. She has some diclofenac that she was given a year ago that has  and she used ice which seemed to help, but she made appointment because she was not sure what might be going on and to make sure it was not something more serious. She denies injury or trauma. Denies fever/chills. No issues with bowel or bladder. No flank pain (not in kidney area) and felt better after ice wonders if just inflamed it some how. No spasm. No numbness or tingling. No radiation of pain anywhere. She does state a few days ago she hurt also on the left side right up from there in her shoulder area but between neck to shoulder and it improved. She also states she does have arthritis.    Review of Systems   Constitutional:  Negative for chills and fever.   HENT: Negative.     Respiratory: Negative.     Cardiovascular:  Negative for chest pain.   Gastrointestinal: Negative.    Genitourinary: Negative.    Musculoskeletal:  Positive for back pain (left side down low on left side near buttocks). Negative for gait problem and myalgias.   Skin:  Negative for color change.   Neurological:  Negative for weakness and numbness.       Prior to Visit Medications    Medication Sig Taking? Authorizing Provider   diclofenac (VOLTAREN) 75 MG EC tablet Take 1 tablet by mouth 2 times daily Yes Kalyn Cordova, LESLEY -

## 2025-07-05 NOTE — PATIENT INSTRUCTIONS
Notify office if you have no improvement or worsening of condition.   Take medication as prescribed.  Continue to rest and add ice as needed. If you want can alternate ice and heat and use whichever one makes it better.  Follow up with PCP in 3-4 days if not improving.  Please review the educational handouts.

## 2025-07-11 RX ORDER — CITALOPRAM HYDROBROMIDE 10 MG/1
15 TABLET ORAL DAILY
Qty: 135 TABLET | Refills: 1 | OUTPATIENT
Start: 2025-07-11

## 2025-08-09 DIAGNOSIS — G47.9 SLEEP DISORDER, UNSPECIFIED: ICD-10-CM

## 2025-08-09 DIAGNOSIS — G25.81 RESTLESS LEGS SYNDROME: ICD-10-CM

## 2025-08-09 DIAGNOSIS — F33.8 OTHER RECURRENT DEPRESSIVE DISORDERS: ICD-10-CM

## 2025-08-11 ENCOUNTER — TELEPHONE (OUTPATIENT)
Age: 70
End: 2025-08-11

## 2025-08-11 RX ORDER — PRAMIPEXOLE DIHYDROCHLORIDE 0.75 MG/1
0.75 TABLET ORAL NIGHTLY
Qty: 90 TABLET | Refills: 1 | Status: SHIPPED | OUTPATIENT
Start: 2025-08-11

## 2025-08-11 RX ORDER — CLONAZEPAM 0.5 MG/1
TABLET ORAL
Qty: 45 TABLET | Refills: 0 | Status: SHIPPED | OUTPATIENT
Start: 2025-08-11 | End: 2025-09-05

## 2025-08-26 ENCOUNTER — OFFICE VISIT (OUTPATIENT)
Age: 70
End: 2025-08-26
Payer: MEDICARE

## 2025-08-26 VITALS
WEIGHT: 106.8 LBS | SYSTOLIC BLOOD PRESSURE: 100 MMHG | HEART RATE: 85 BPM | BODY MASS INDEX: 18.23 KG/M2 | TEMPERATURE: 98.1 F | DIASTOLIC BLOOD PRESSURE: 50 MMHG | RESPIRATION RATE: 16 BRPM | OXYGEN SATURATION: 98 % | HEIGHT: 64 IN

## 2025-08-26 DIAGNOSIS — F41.8 DEPRESSION WITH ANXIETY: Primary | ICD-10-CM

## 2025-08-26 DIAGNOSIS — R94.31 ABNORMAL HOLTER EXAM: ICD-10-CM

## 2025-08-26 DIAGNOSIS — Z79.899 ENCOUNTER FOR LONG-TERM (CURRENT) USE OF MEDICATIONS: ICD-10-CM

## 2025-08-26 DIAGNOSIS — G47.9 SLEEP DISORDER, UNSPECIFIED: ICD-10-CM

## 2025-08-26 DIAGNOSIS — G25.81 RESTLESS LEGS SYNDROME: ICD-10-CM

## 2025-08-26 DIAGNOSIS — M81.0 AGE-RELATED OSTEOPOROSIS WITHOUT CURRENT PATHOLOGICAL FRACTURE: ICD-10-CM

## 2025-08-26 PROCEDURE — 99215 OFFICE O/P EST HI 40 MIN: CPT | Performed by: INTERNAL MEDICINE

## 2025-08-26 PROCEDURE — 1159F MED LIST DOCD IN RCRD: CPT | Performed by: INTERNAL MEDICINE

## 2025-08-26 PROCEDURE — G8419 CALC BMI OUT NRM PARAM NOF/U: HCPCS | Performed by: INTERNAL MEDICINE

## 2025-08-26 PROCEDURE — 1125F AMNT PAIN NOTED PAIN PRSNT: CPT | Performed by: INTERNAL MEDICINE

## 2025-08-26 PROCEDURE — 3017F COLORECTAL CA SCREEN DOC REV: CPT | Performed by: INTERNAL MEDICINE

## 2025-08-26 PROCEDURE — 1123F ACP DISCUSS/DSCN MKR DOCD: CPT | Performed by: INTERNAL MEDICINE

## 2025-08-26 PROCEDURE — G8399 PT W/DXA RESULTS DOCUMENT: HCPCS | Performed by: INTERNAL MEDICINE

## 2025-08-26 PROCEDURE — 1090F PRES/ABSN URINE INCON ASSESS: CPT | Performed by: INTERNAL MEDICINE

## 2025-08-26 PROCEDURE — G8427 DOCREV CUR MEDS BY ELIG CLIN: HCPCS | Performed by: INTERNAL MEDICINE

## 2025-08-26 PROCEDURE — 1036F TOBACCO NON-USER: CPT | Performed by: INTERNAL MEDICINE

## 2025-08-26 RX ORDER — CITALOPRAM HYDROBROMIDE 10 MG/5ML
15 SOLUTION ORAL DAILY
Qty: 720 ML | Refills: 1 | Status: SHIPPED | OUTPATIENT
Start: 2025-08-26

## (undated) DEVICE — REM POLYHESIVE ADULT PATIENT RETURN ELECTRODE: Brand: VALLEYLAB

## (undated) DEVICE — Device

## (undated) DEVICE — TOTAL JOINT - SMH: Brand: MEDLINE INDUSTRIES, INC.

## (undated) DEVICE — HYPODERMIC SAFETY NEEDLE: Brand: MAGELLAN

## (undated) DEVICE — SOLUTION SURG PREP 26 CC PURPREP

## (undated) DEVICE — HANDPIECE SET WITH BONE CLEANING TIP AND SUCTION TUBE: Brand: INTERPULSE

## (undated) DEVICE — SUT MCRYL 3-0 27IN PS1 UD --

## (undated) DEVICE — SPONGE GZ W4XL4IN COT 12 PLY TYP VII WVN C FLD DSGN STERILE

## (undated) DEVICE — GARMENT,MEDLINE,DVT,INT,CALF,MED, GEN2: Brand: MEDLINE

## (undated) DEVICE — 4-PORT MANIFOLD: Brand: NEPTUNE 2

## (undated) DEVICE — DRAPE,REIN 53X77,STERILE: Brand: MEDLINE

## (undated) DEVICE — SUT STRATA PDS+ 48MM SZ 1 CTX -- STRATAFIX SYMMETRIC 45CML

## (undated) DEVICE — HEADLESS TROCHAR PIN 75MM: Brand: ZUK

## (undated) DEVICE — TUBING, SUCTION, 9/32" X 12', STRAIGHT: Brand: MEDLINE INDUSTRIES, INC.

## (undated) DEVICE — CUSTOM CAST PD STR

## (undated) DEVICE — Device: Brand: JELCO

## (undated) DEVICE — BANDAGE ADH W0.75XL3IN NAT PLAS CURAD

## (undated) DEVICE — STRIP,CLOSURE,WOUND,MEDI-STRIP,1/2X4: Brand: MEDLINE

## (undated) DEVICE — MARKER,SKIN,WI/RULER AND LABELS: Brand: MEDLINE

## (undated) DEVICE — SOLUTION IRRIG 3000ML 0.9% SOD CHL USP UROMATIC PLAS CONT

## (undated) DEVICE — ZIMMER® STERILE DISPOSABLE TOURNIQUET CUFF WITH PLC, DUAL PORT, SINGLE BLADDER, 34 IN. (86 CM)

## (undated) DEVICE — SYR LR LCK 1ML GRAD NSAF 30ML --

## (undated) DEVICE — GLOVE SURG SZ 8 L12IN FNGR THK94MIL STD WHT LTX FREE

## (undated) DEVICE — CONTAINER,SPECIMEN,3OZ,OR STRL: Brand: MEDLINE

## (undated) DEVICE — SWABSTICK MEDICATED W2.75XL5.75IN 10% POVIDONE IOD IMPREG

## (undated) DEVICE — BLADE SAW W083XL354IN THK0047IN CUT THK0047IN SAG FLR

## (undated) DEVICE — SYR 20ML LL STRL LF --

## (undated) DEVICE — YANKAUER,FLEXIBLE HANDLE,REGLR CAPACITY: Brand: MEDLINE INDUSTRIES, INC.

## (undated) DEVICE — BANDAGE COMPR M W6INXL10YD WHT BGE VELC E MTRX HK AND LOOP

## (undated) DEVICE — TOTAL TRAY, 16FR 10ML SIL FOLEY, URN: Brand: MEDLINE

## (undated) DEVICE — BRUSH SCRB DRY NL CLN LF GRN --

## (undated) DEVICE — SYRINGE MED 10ML LUERLOCK TIP W/O SFTY DISP

## (undated) DEVICE — DRAPE,EXTREMITY,89X128,STERILE: Brand: MEDLINE

## (undated) DEVICE — CARTRIDGE BNE CEM MIX UNIV TWR VAC ROTOR BRK OFF NOZ W/O

## (undated) DEVICE — SUT VCRL 2-0 36IN CT UD --